# Patient Record
Sex: MALE | Race: WHITE | Employment: OTHER | URBAN - METROPOLITAN AREA
[De-identification: names, ages, dates, MRNs, and addresses within clinical notes are randomized per-mention and may not be internally consistent; named-entity substitution may affect disease eponyms.]

---

## 2017-01-18 ENCOUNTER — HOSPITAL ENCOUNTER (OUTPATIENT)
Dept: NEUROLOGY | Age: 72
Discharge: HOME | End: 2017-01-18
Payer: MEDICARE

## 2017-01-18 DIAGNOSIS — R20.2 PARESTHESIAS: ICD-10-CM

## 2017-01-18 PROCEDURE — 95886 MUSC TEST DONE W/N TEST COMP: CPT

## 2017-01-18 PROCEDURE — 95910 NRV CNDJ TEST 7-8 STUDIES: CPT

## 2017-01-19 ENCOUNTER — OFFICE VISIT (OUTPATIENT)
Dept: FAMILY MEDICINE CLINIC | Age: 72
End: 2017-01-19

## 2017-01-19 VITALS
SYSTOLIC BLOOD PRESSURE: 116 MMHG | WEIGHT: 223 LBS | HEIGHT: 70 IN | DIASTOLIC BLOOD PRESSURE: 76 MMHG | OXYGEN SATURATION: 96 % | HEART RATE: 75 BPM | BODY MASS INDEX: 31.92 KG/M2

## 2017-01-19 DIAGNOSIS — H90.3 SENSORINEURAL HEARING LOSS, BILATERAL: ICD-10-CM

## 2017-01-19 DIAGNOSIS — G56.03 CARPAL TUNNEL SYNDROME, BILATERAL: ICD-10-CM

## 2017-01-19 DIAGNOSIS — I10 ESSENTIAL HYPERTENSION: Primary | ICD-10-CM

## 2017-01-19 DIAGNOSIS — H60.61 CHRONIC OTITIS EXTERNA OF RIGHT EAR, UNSPECIFIED TYPE: ICD-10-CM

## 2017-01-19 PROCEDURE — G8484 FLU IMMUNIZE NO ADMIN: HCPCS | Performed by: FAMILY MEDICINE

## 2017-01-19 PROCEDURE — 1036F TOBACCO NON-USER: CPT | Performed by: FAMILY MEDICINE

## 2017-01-19 PROCEDURE — 99214 OFFICE O/P EST MOD 30 MIN: CPT | Performed by: FAMILY MEDICINE

## 2017-01-19 PROCEDURE — 3017F COLORECTAL CA SCREEN DOC REV: CPT | Performed by: FAMILY MEDICINE

## 2017-01-19 PROCEDURE — 1123F ACP DISCUSS/DSCN MKR DOCD: CPT | Performed by: FAMILY MEDICINE

## 2017-01-19 PROCEDURE — G8598 ASA/ANTIPLAT THER USED: HCPCS | Performed by: FAMILY MEDICINE

## 2017-01-19 PROCEDURE — 4040F PNEUMOC VAC/ADMIN/RCVD: CPT | Performed by: FAMILY MEDICINE

## 2017-01-19 PROCEDURE — G8419 CALC BMI OUT NRM PARAM NOF/U: HCPCS | Performed by: FAMILY MEDICINE

## 2017-01-19 PROCEDURE — G8427 DOCREV CUR MEDS BY ELIG CLIN: HCPCS | Performed by: FAMILY MEDICINE

## 2017-01-19 RX ORDER — HYDROCORTISONE AND ACETIC ACID 20.75; 10.375 MG/ML; MG/ML
4 SOLUTION AURICULAR (OTIC) 3 TIMES DAILY
Qty: 10 ML | Refills: 1 | Status: SHIPPED | OUTPATIENT
Start: 2017-01-19 | End: 2017-01-29

## 2017-01-19 ASSESSMENT — PATIENT HEALTH QUESTIONNAIRE - PHQ9
1. LITTLE INTEREST OR PLEASURE IN DOING THINGS: 0
2. FEELING DOWN, DEPRESSED OR HOPELESS: 0
SUM OF ALL RESPONSES TO PHQ QUESTIONS 1-9: 0
SUM OF ALL RESPONSES TO PHQ9 QUESTIONS 1 & 2: 0

## 2017-01-19 ASSESSMENT — ENCOUNTER SYMPTOMS
SHORTNESS OF BREATH: 0
BLURRED VISION: 0

## 2017-02-15 ENCOUNTER — HOSPITAL ENCOUNTER (OUTPATIENT)
Dept: NEUROLOGY | Age: 72
Discharge: HOME | End: 2017-02-15
Payer: MEDICARE

## 2017-02-15 PROCEDURE — 95910 NRV CNDJ TEST 7-8 STUDIES: CPT

## 2017-02-15 PROCEDURE — 95886 MUSC TEST DONE W/N TEST COMP: CPT

## 2017-03-09 ENCOUNTER — OFFICE VISIT (OUTPATIENT)
Dept: FAMILY MEDICINE CLINIC | Age: 72
End: 2017-03-09

## 2017-03-09 VITALS
TEMPERATURE: 97.6 F | HEART RATE: 80 BPM | HEIGHT: 70 IN | DIASTOLIC BLOOD PRESSURE: 70 MMHG | OXYGEN SATURATION: 97 % | WEIGHT: 222.5 LBS | SYSTOLIC BLOOD PRESSURE: 116 MMHG | BODY MASS INDEX: 31.85 KG/M2

## 2017-03-09 DIAGNOSIS — I20.8 STABLE ANGINA (HCC): ICD-10-CM

## 2017-03-09 DIAGNOSIS — J20.9 ACUTE BRONCHITIS, UNSPECIFIED ORGANISM: Primary | ICD-10-CM

## 2017-03-09 DIAGNOSIS — I25.10 CORONARY ARTERY DISEASE INVOLVING NATIVE CORONARY ARTERY OF NATIVE HEART WITHOUT ANGINA PECTORIS: ICD-10-CM

## 2017-03-09 PROCEDURE — G8598 ASA/ANTIPLAT THER USED: HCPCS | Performed by: FAMILY MEDICINE

## 2017-03-09 PROCEDURE — 4040F PNEUMOC VAC/ADMIN/RCVD: CPT | Performed by: FAMILY MEDICINE

## 2017-03-09 PROCEDURE — 99213 OFFICE O/P EST LOW 20 MIN: CPT | Performed by: FAMILY MEDICINE

## 2017-03-09 PROCEDURE — 1123F ACP DISCUSS/DSCN MKR DOCD: CPT | Performed by: FAMILY MEDICINE

## 2017-03-09 PROCEDURE — G8419 CALC BMI OUT NRM PARAM NOF/U: HCPCS | Performed by: FAMILY MEDICINE

## 2017-03-09 PROCEDURE — G8428 CUR MEDS NOT DOCUMENT: HCPCS | Performed by: FAMILY MEDICINE

## 2017-03-09 PROCEDURE — 1036F TOBACCO NON-USER: CPT | Performed by: FAMILY MEDICINE

## 2017-03-09 PROCEDURE — G8484 FLU IMMUNIZE NO ADMIN: HCPCS | Performed by: FAMILY MEDICINE

## 2017-03-09 PROCEDURE — 3017F COLORECTAL CA SCREEN DOC REV: CPT | Performed by: FAMILY MEDICINE

## 2017-03-09 RX ORDER — AZITHROMYCIN 250 MG/1
TABLET, FILM COATED ORAL
Qty: 1 PACKET | Refills: 0 | Status: SHIPPED | OUTPATIENT
Start: 2017-03-09 | End: 2017-03-19

## 2017-03-09 RX ORDER — METHYLPREDNISOLONE 4 MG/1
TABLET ORAL
Qty: 1 KIT | Refills: 0 | Status: SHIPPED | OUTPATIENT
Start: 2017-03-09 | End: 2017-03-15

## 2017-03-09 ASSESSMENT — ENCOUNTER SYMPTOMS
HEMOPTYSIS: 0
RHINORRHEA: 1
COUGH: 1
SORE THROAT: 0

## 2017-04-10 ENCOUNTER — OFFICE VISIT (OUTPATIENT)
Dept: CARDIOLOGY | Age: 72
End: 2017-04-10

## 2017-04-10 VITALS
DIASTOLIC BLOOD PRESSURE: 70 MMHG | HEART RATE: 77 BPM | BODY MASS INDEX: 32.07 KG/M2 | WEIGHT: 224 LBS | SYSTOLIC BLOOD PRESSURE: 120 MMHG | OXYGEN SATURATION: 95 % | HEIGHT: 70 IN

## 2017-04-10 DIAGNOSIS — I10 ESSENTIAL HYPERTENSION: ICD-10-CM

## 2017-04-10 DIAGNOSIS — I25.110 CORONARY ARTERY DISEASE INVOLVING NATIVE CORONARY ARTERY OF NATIVE HEART WITH UNSTABLE ANGINA PECTORIS (HCC): Primary | ICD-10-CM

## 2017-04-10 DIAGNOSIS — I67.9 CEREBROVASCULAR DISEASE: ICD-10-CM

## 2017-04-10 PROBLEM — M65.342 TRIGGER RING FINGER OF LEFT HAND: Status: ACTIVE | Noted: 2017-03-13

## 2017-04-10 PROBLEM — M65.341 TRIGGER RING FINGER OF RIGHT HAND: Status: ACTIVE | Noted: 2017-03-13

## 2017-04-10 PROCEDURE — G8417 CALC BMI ABV UP PARAM F/U: HCPCS | Performed by: INTERNAL MEDICINE

## 2017-04-10 PROCEDURE — G8598 ASA/ANTIPLAT THER USED: HCPCS | Performed by: INTERNAL MEDICINE

## 2017-04-10 PROCEDURE — G8427 DOCREV CUR MEDS BY ELIG CLIN: HCPCS | Performed by: INTERNAL MEDICINE

## 2017-04-10 PROCEDURE — 1036F TOBACCO NON-USER: CPT | Performed by: INTERNAL MEDICINE

## 2017-04-10 PROCEDURE — 99213 OFFICE O/P EST LOW 20 MIN: CPT | Performed by: INTERNAL MEDICINE

## 2017-04-10 PROCEDURE — 3017F COLORECTAL CA SCREEN DOC REV: CPT | Performed by: INTERNAL MEDICINE

## 2017-04-10 PROCEDURE — 1123F ACP DISCUSS/DSCN MKR DOCD: CPT | Performed by: INTERNAL MEDICINE

## 2017-04-10 PROCEDURE — 4040F PNEUMOC VAC/ADMIN/RCVD: CPT | Performed by: INTERNAL MEDICINE

## 2017-04-10 ASSESSMENT — ENCOUNTER SYMPTOMS
RESPIRATORY NEGATIVE: 1
COLOR CHANGE: 0
SHORTNESS OF BREATH: 0
COUGH: 0
CHEST TIGHTNESS: 0
APNEA: 0

## 2017-04-25 ENCOUNTER — OFFICE VISIT (OUTPATIENT)
Dept: FAMILY MEDICINE CLINIC | Age: 72
End: 2017-04-25

## 2017-04-25 VITALS
HEART RATE: 62 BPM | BODY MASS INDEX: 30.92 KG/M2 | OXYGEN SATURATION: 97 % | TEMPERATURE: 97 F | DIASTOLIC BLOOD PRESSURE: 62 MMHG | WEIGHT: 216 LBS | SYSTOLIC BLOOD PRESSURE: 118 MMHG | HEIGHT: 70 IN

## 2017-04-25 DIAGNOSIS — L85.3 DRY SKIN: ICD-10-CM

## 2017-04-25 DIAGNOSIS — L73.9 FOLLICULITIS: Primary | ICD-10-CM

## 2017-04-25 PROCEDURE — 1036F TOBACCO NON-USER: CPT | Performed by: NURSE PRACTITIONER

## 2017-04-25 PROCEDURE — 3017F COLORECTAL CA SCREEN DOC REV: CPT | Performed by: NURSE PRACTITIONER

## 2017-04-25 PROCEDURE — G8417 CALC BMI ABV UP PARAM F/U: HCPCS | Performed by: NURSE PRACTITIONER

## 2017-04-25 PROCEDURE — 4040F PNEUMOC VAC/ADMIN/RCVD: CPT | Performed by: NURSE PRACTITIONER

## 2017-04-25 PROCEDURE — G8427 DOCREV CUR MEDS BY ELIG CLIN: HCPCS | Performed by: NURSE PRACTITIONER

## 2017-04-25 PROCEDURE — G8598 ASA/ANTIPLAT THER USED: HCPCS | Performed by: NURSE PRACTITIONER

## 2017-04-25 PROCEDURE — 99212 OFFICE O/P EST SF 10 MIN: CPT | Performed by: NURSE PRACTITIONER

## 2017-04-25 PROCEDURE — 1123F ACP DISCUSS/DSCN MKR DOCD: CPT | Performed by: NURSE PRACTITIONER

## 2017-04-25 ASSESSMENT — ENCOUNTER SYMPTOMS
COUGH: 0
SHORTNESS OF BREATH: 0

## 2017-05-18 ENCOUNTER — OFFICE VISIT (OUTPATIENT)
Dept: FAMILY MEDICINE CLINIC | Age: 72
End: 2017-05-18

## 2017-05-18 VITALS
DIASTOLIC BLOOD PRESSURE: 68 MMHG | HEART RATE: 77 BPM | HEIGHT: 70 IN | BODY MASS INDEX: 31.35 KG/M2 | SYSTOLIC BLOOD PRESSURE: 104 MMHG | WEIGHT: 219 LBS | OXYGEN SATURATION: 96 %

## 2017-05-18 DIAGNOSIS — H60.61 CHRONIC OTITIS EXTERNA OF RIGHT EAR, UNSPECIFIED TYPE: ICD-10-CM

## 2017-05-18 DIAGNOSIS — E78.2 MIXED HYPERLIPIDEMIA: ICD-10-CM

## 2017-05-18 DIAGNOSIS — I10 ESSENTIAL HYPERTENSION: Primary | ICD-10-CM

## 2017-05-18 PROCEDURE — 99214 OFFICE O/P EST MOD 30 MIN: CPT | Performed by: FAMILY MEDICINE

## 2017-05-18 PROCEDURE — 4040F PNEUMOC VAC/ADMIN/RCVD: CPT | Performed by: FAMILY MEDICINE

## 2017-05-18 PROCEDURE — 3017F COLORECTAL CA SCREEN DOC REV: CPT | Performed by: FAMILY MEDICINE

## 2017-05-18 PROCEDURE — G8417 CALC BMI ABV UP PARAM F/U: HCPCS | Performed by: FAMILY MEDICINE

## 2017-05-18 PROCEDURE — G8598 ASA/ANTIPLAT THER USED: HCPCS | Performed by: FAMILY MEDICINE

## 2017-05-18 PROCEDURE — 1036F TOBACCO NON-USER: CPT | Performed by: FAMILY MEDICINE

## 2017-05-18 PROCEDURE — G8427 DOCREV CUR MEDS BY ELIG CLIN: HCPCS | Performed by: FAMILY MEDICINE

## 2017-05-18 PROCEDURE — 1123F ACP DISCUSS/DSCN MKR DOCD: CPT | Performed by: FAMILY MEDICINE

## 2017-05-18 RX ORDER — HYDROCORTISONE AND ACETIC ACID 20.75; 10.375 MG/ML; MG/ML
SOLUTION AURICULAR (OTIC)
Refills: 0 | COMMUNITY
Start: 2017-05-02 | End: 2017-09-19 | Stop reason: ALTCHOICE

## 2017-05-18 ASSESSMENT — ENCOUNTER SYMPTOMS
SHORTNESS OF BREATH: 0
BLURRED VISION: 0

## 2017-09-01 RX ORDER — FINASTERIDE 5 MG/1
TABLET, FILM COATED ORAL
Qty: 90 TABLET | Refills: 3 | Status: SHIPPED | OUTPATIENT
Start: 2017-09-01 | End: 2018-12-11 | Stop reason: SDUPTHER

## 2017-09-19 ENCOUNTER — OFFICE VISIT (OUTPATIENT)
Dept: FAMILY MEDICINE CLINIC | Age: 72
End: 2017-09-19

## 2017-09-19 VITALS
HEART RATE: 74 BPM | HEIGHT: 70 IN | OXYGEN SATURATION: 96 % | SYSTOLIC BLOOD PRESSURE: 102 MMHG | WEIGHT: 219 LBS | BODY MASS INDEX: 31.35 KG/M2 | DIASTOLIC BLOOD PRESSURE: 74 MMHG

## 2017-09-19 DIAGNOSIS — N52.9 ERECTILE DYSFUNCTION, UNSPECIFIED ERECTILE DYSFUNCTION TYPE: ICD-10-CM

## 2017-09-19 DIAGNOSIS — E78.2 MIXED HYPERLIPIDEMIA: ICD-10-CM

## 2017-09-19 DIAGNOSIS — Z23 NEEDS FLU SHOT: ICD-10-CM

## 2017-09-19 DIAGNOSIS — I10 ESSENTIAL HYPERTENSION: Primary | ICD-10-CM

## 2017-09-19 DIAGNOSIS — E29.1 HYPOGONADISM IN MALE: ICD-10-CM

## 2017-09-19 DIAGNOSIS — I10 ESSENTIAL HYPERTENSION: ICD-10-CM

## 2017-09-19 PROBLEM — M65.341 TRIGGER RING FINGER OF RIGHT HAND: Status: RESOLVED | Noted: 2017-03-13 | Resolved: 2017-09-19

## 2017-09-19 PROBLEM — M65.342 TRIGGER RING FINGER OF LEFT HAND: Status: RESOLVED | Noted: 2017-03-13 | Resolved: 2017-09-19

## 2017-09-19 LAB
ALT SERPL-CCNC: 22 U/L (ref 0–41)
ANION GAP SERPL CALCULATED.3IONS-SCNC: 16 MEQ/L (ref 7–13)
AST SERPL-CCNC: 23 U/L (ref 0–40)
BASOPHILS ABSOLUTE: 0 K/UL (ref 0–0.2)
BASOPHILS RELATIVE PERCENT: 0.3 %
BUN BLDV-MCNC: 15 MG/DL (ref 8–23)
CALCIUM SERPL-MCNC: 9 MG/DL (ref 8.6–10.2)
CHLORIDE BLD-SCNC: 102 MEQ/L (ref 98–107)
CHOLESTEROL, TOTAL: 129 MG/DL (ref 0–199)
CO2: 23 MEQ/L (ref 22–29)
CREAT SERPL-MCNC: 0.65 MG/DL (ref 0.7–1.2)
EOSINOPHILS ABSOLUTE: 0.1 K/UL (ref 0–0.7)
EOSINOPHILS RELATIVE PERCENT: 1.1 %
GFR AFRICAN AMERICAN: >60
GFR NON-AFRICAN AMERICAN: >60
GLUCOSE BLD-MCNC: 99 MG/DL (ref 74–109)
HCT VFR BLD CALC: 46 % (ref 42–52)
HDLC SERPL-MCNC: 45 MG/DL (ref 40–59)
HEMOGLOBIN: 15.5 G/DL (ref 14–18)
LDL CHOLESTEROL CALCULATED: 61 MG/DL (ref 0–129)
LYMPHOCYTES ABSOLUTE: 1.2 K/UL (ref 1–4.8)
LYMPHOCYTES RELATIVE PERCENT: 15.5 %
MCH RBC QN AUTO: 30.6 PG (ref 27–31.3)
MCHC RBC AUTO-ENTMCNC: 33.8 % (ref 33–37)
MCV RBC AUTO: 90.5 FL (ref 80–100)
MONOCYTES ABSOLUTE: 0.5 K/UL (ref 0.2–0.8)
MONOCYTES RELATIVE PERCENT: 6.9 %
NEUTROPHILS ABSOLUTE: 6 K/UL (ref 1.4–6.5)
NEUTROPHILS RELATIVE PERCENT: 76.2 %
PDW BLD-RTO: 12.8 % (ref 11.5–14.5)
PLATELET # BLD: 203 K/UL (ref 130–400)
POTASSIUM SERPL-SCNC: 4.4 MEQ/L (ref 3.5–5.1)
RBC # BLD: 5.08 M/UL (ref 4.7–6.1)
SODIUM BLD-SCNC: 141 MEQ/L (ref 132–144)
TRIGL SERPL-MCNC: 114 MG/DL (ref 0–200)
WBC # BLD: 7.8 K/UL (ref 4.8–10.8)

## 2017-09-19 PROCEDURE — 90662 IIV NO PRSV INCREASED AG IM: CPT | Performed by: FAMILY MEDICINE

## 2017-09-19 PROCEDURE — G8427 DOCREV CUR MEDS BY ELIG CLIN: HCPCS | Performed by: FAMILY MEDICINE

## 2017-09-19 PROCEDURE — 1036F TOBACCO NON-USER: CPT | Performed by: FAMILY MEDICINE

## 2017-09-19 PROCEDURE — G8598 ASA/ANTIPLAT THER USED: HCPCS | Performed by: FAMILY MEDICINE

## 2017-09-19 PROCEDURE — 4040F PNEUMOC VAC/ADMIN/RCVD: CPT | Performed by: FAMILY MEDICINE

## 2017-09-19 PROCEDURE — G0008 ADMIN INFLUENZA VIRUS VAC: HCPCS | Performed by: FAMILY MEDICINE

## 2017-09-19 PROCEDURE — 1123F ACP DISCUSS/DSCN MKR DOCD: CPT | Performed by: FAMILY MEDICINE

## 2017-09-19 PROCEDURE — 99214 OFFICE O/P EST MOD 30 MIN: CPT | Performed by: FAMILY MEDICINE

## 2017-09-19 PROCEDURE — G8417 CALC BMI ABV UP PARAM F/U: HCPCS | Performed by: FAMILY MEDICINE

## 2017-09-19 PROCEDURE — 3017F COLORECTAL CA SCREEN DOC REV: CPT | Performed by: FAMILY MEDICINE

## 2017-09-19 RX ORDER — SILDENAFIL 50 MG/1
50 TABLET, FILM COATED ORAL PRN
Qty: 12 TABLET | Refills: 2 | Status: SHIPPED | OUTPATIENT
Start: 2017-09-19 | End: 2019-05-13

## 2017-09-19 ASSESSMENT — ENCOUNTER SYMPTOMS
BLURRED VISION: 0
SHORTNESS OF BREATH: 0

## 2017-09-20 LAB — TESTOSTERONE TOTAL-MALE: 485 NG/DL (ref 300–720)

## 2017-11-15 DIAGNOSIS — N13.8 BPH WITH OBSTRUCTION/LOWER URINARY TRACT SYMPTOMS: ICD-10-CM

## 2017-11-15 DIAGNOSIS — N40.1 BPH WITH OBSTRUCTION/LOWER URINARY TRACT SYMPTOMS: ICD-10-CM

## 2017-11-15 LAB — PROSTATE SPECIFIC ANTIGEN: 1.75 NG/ML (ref 0–6.22)

## 2017-11-17 ENCOUNTER — OFFICE VISIT (OUTPATIENT)
Dept: UROLOGY | Age: 72
End: 2017-11-17

## 2017-11-17 VITALS
SYSTOLIC BLOOD PRESSURE: 138 MMHG | DIASTOLIC BLOOD PRESSURE: 84 MMHG | HEIGHT: 70 IN | WEIGHT: 218 LBS | HEART RATE: 74 BPM | BODY MASS INDEX: 31.21 KG/M2

## 2017-11-17 DIAGNOSIS — N13.8 BPH WITH OBSTRUCTION/LOWER URINARY TRACT SYMPTOMS: Primary | ICD-10-CM

## 2017-11-17 DIAGNOSIS — N40.1 BPH WITH OBSTRUCTION/LOWER URINARY TRACT SYMPTOMS: Primary | ICD-10-CM

## 2017-11-17 PROCEDURE — G8598 ASA/ANTIPLAT THER USED: HCPCS | Performed by: UROLOGY

## 2017-11-17 PROCEDURE — G8417 CALC BMI ABV UP PARAM F/U: HCPCS | Performed by: UROLOGY

## 2017-11-17 PROCEDURE — G8484 FLU IMMUNIZE NO ADMIN: HCPCS | Performed by: UROLOGY

## 2017-11-17 PROCEDURE — G8427 DOCREV CUR MEDS BY ELIG CLIN: HCPCS | Performed by: UROLOGY

## 2017-11-17 PROCEDURE — 1036F TOBACCO NON-USER: CPT | Performed by: UROLOGY

## 2017-11-17 PROCEDURE — 1123F ACP DISCUSS/DSCN MKR DOCD: CPT | Performed by: UROLOGY

## 2017-11-17 PROCEDURE — 4040F PNEUMOC VAC/ADMIN/RCVD: CPT | Performed by: UROLOGY

## 2017-11-17 PROCEDURE — 3017F COLORECTAL CA SCREEN DOC REV: CPT | Performed by: UROLOGY

## 2017-11-17 PROCEDURE — 99213 OFFICE O/P EST LOW 20 MIN: CPT | Performed by: UROLOGY

## 2017-11-17 RX ORDER — TAMSULOSIN HYDROCHLORIDE 0.4 MG/1
0.4 CAPSULE ORAL DAILY
Qty: 90 CAPSULE | Refills: 3 | Status: SHIPPED | OUTPATIENT
Start: 2017-11-17 | End: 2018-12-11 | Stop reason: SDUPTHER

## 2017-11-17 ASSESSMENT — ENCOUNTER SYMPTOMS: SHORTNESS OF BREATH: 0

## 2017-12-11 RX ORDER — SIMVASTATIN 20 MG
TABLET ORAL
Qty: 90 TABLET | Refills: 3 | Status: SHIPPED | OUTPATIENT
Start: 2017-12-11 | End: 2018-12-09 | Stop reason: SDUPTHER

## 2018-03-20 ENCOUNTER — OFFICE VISIT (OUTPATIENT)
Dept: FAMILY MEDICINE CLINIC | Age: 73
End: 2018-03-20
Payer: MEDICARE

## 2018-03-20 VITALS
DIASTOLIC BLOOD PRESSURE: 82 MMHG | HEIGHT: 70 IN | SYSTOLIC BLOOD PRESSURE: 118 MMHG | OXYGEN SATURATION: 95 % | BODY MASS INDEX: 32.84 KG/M2 | HEART RATE: 76 BPM | WEIGHT: 229.4 LBS

## 2018-03-20 DIAGNOSIS — I10 ESSENTIAL HYPERTENSION: ICD-10-CM

## 2018-03-20 DIAGNOSIS — H60.61 CHRONIC OTITIS EXTERNA OF RIGHT EAR, UNSPECIFIED TYPE: ICD-10-CM

## 2018-03-20 DIAGNOSIS — N52.9 ERECTILE DYSFUNCTION, UNSPECIFIED ERECTILE DYSFUNCTION TYPE: ICD-10-CM

## 2018-03-20 DIAGNOSIS — E78.2 MIXED HYPERLIPIDEMIA: Primary | ICD-10-CM

## 2018-03-20 PROCEDURE — G8482 FLU IMMUNIZE ORDER/ADMIN: HCPCS | Performed by: FAMILY MEDICINE

## 2018-03-20 PROCEDURE — G8598 ASA/ANTIPLAT THER USED: HCPCS | Performed by: FAMILY MEDICINE

## 2018-03-20 PROCEDURE — 99214 OFFICE O/P EST MOD 30 MIN: CPT | Performed by: FAMILY MEDICINE

## 2018-03-20 PROCEDURE — G8417 CALC BMI ABV UP PARAM F/U: HCPCS | Performed by: FAMILY MEDICINE

## 2018-03-20 PROCEDURE — 4040F PNEUMOC VAC/ADMIN/RCVD: CPT | Performed by: FAMILY MEDICINE

## 2018-03-20 PROCEDURE — 1036F TOBACCO NON-USER: CPT | Performed by: FAMILY MEDICINE

## 2018-03-20 PROCEDURE — 1123F ACP DISCUSS/DSCN MKR DOCD: CPT | Performed by: FAMILY MEDICINE

## 2018-03-20 PROCEDURE — 3288F FALL RISK ASSESSMENT DOCD: CPT | Performed by: FAMILY MEDICINE

## 2018-03-20 PROCEDURE — G8510 SCR DEP NEG, NO PLAN REQD: HCPCS | Performed by: FAMILY MEDICINE

## 2018-03-20 PROCEDURE — G8427 DOCREV CUR MEDS BY ELIG CLIN: HCPCS | Performed by: FAMILY MEDICINE

## 2018-03-20 PROCEDURE — 3017F COLORECTAL CA SCREEN DOC REV: CPT | Performed by: FAMILY MEDICINE

## 2018-03-20 ASSESSMENT — PATIENT HEALTH QUESTIONNAIRE - PHQ9
SUM OF ALL RESPONSES TO PHQ9 QUESTIONS 1 & 2: 0
SUM OF ALL RESPONSES TO PHQ QUESTIONS 1-9: 0
1. LITTLE INTEREST OR PLEASURE IN DOING THINGS: 0
2. FEELING DOWN, DEPRESSED OR HOPELESS: 0

## 2018-03-20 ASSESSMENT — ENCOUNTER SYMPTOMS
SHORTNESS OF BREATH: 0
ABDOMINAL PAIN: 0

## 2018-03-20 NOTE — PROGRESS NOTES
Subjective  Dao Gregory, 67 y.o. male presents today with:  Chief Complaint   Patient presents with    6 Month Follow-Up       Hyperlipidemia   This is a chronic problem. The current episode started more than 1 year ago. The problem is controlled. Recent lipid tests were reviewed and are normal. He has no history of chronic renal disease, diabetes or liver disease. There are no known factors aggravating his hyperlipidemia. Pertinent negatives include no chest pain, leg pain or shortness of breath. Current antihyperlipidemic treatment includes statins. The current treatment provides significant improvement of lipids. There are no compliance problems. Risk factors for coronary artery disease include dyslipidemia, hypertension, male sex and obesity. Here for f/u on chronic problems including HTN, hyperlipidemia, ED and chronic otitis externa for which he sees Dr. Saloni Patel. No recent problems. Review of Systems   Constitutional: Negative for fever. Respiratory: Negative for shortness of breath. Cardiovascular: Negative for chest pain. Gastrointestinal: Negative for abdominal pain. Skin: Negative for rash.        Past Medical History:   Diagnosis Date    BPH (benign prostatic hypertrophy)     CAD (coronary artery disease)     Cerebrovascular disease     Chronic otitis externa 2/11/2016    Chronic suppurative otitis media 11/11/2015    Ear injury 2006    [facility]    ED (erectile dysfunction) 11/28/2014    Hearing loss     History of nephrolithiasis     History of pneumonia 2004    History of prediabetes     Hyperlipidemia 10/10/2013    Hypertension     Hypogonadism in male     Mass of chest wall, left     Optic neuropathy, ischemic     Paresthesias     Perforation of right tympanic membrane     chronic; Dr. Mar Osman PSA (prostate specific antigen) 2007- 1.12    Seborrheic keratosis 10/10/2013    Sensorineural hearing loss, bilateral     Stable angina ([address]  Trigger thumb, left thumb      Past Surgical History:   Procedure Laterality Date    CARPAL TUNNEL RELEASE Right 05/30/2017    CCRAVEN PEARL MD    CARPAL TUNNEL RELEASE Left 06/2017    CCRAVEN PEARL MD    COLONOSCOPY  6/2015    Dr. Goncalves [place] RESECTION  3/22/16    DR. BARRAGAN    OTHER SURGICAL HISTORY Left 1/2015    hydrocelectomy     Social History     Social History    Marital status:      Spouse name: N/A    Number of children: 2    Years of education: N/A     Occupational History    Not on file. Social History Main Topics    Smoking status: Never Smoker    Smokeless tobacco: Never Used    Alcohol use Yes      Comment: occasionally    Drug use: No    Sexual activity: Not Currently     Other Topics Concern    Not on file     Social History Narrative    2 [place] in [place]. Retired from [employer]. Family History   Problem Relation Age of Onset    Stroke Father      No Known Allergies  Current Outpatient Prescriptions   Medication Sig Dispense Refill    irbesartan (AVAPRO) 150 MG tablet TAKE 1 TABLET DAILY 90 tablet 1    simvastatin (ZOCOR) 20 MG tablet TAKE 1 TABLET NIGHTLY 90 tablet 3    tamsulosin (FLOMAX) 0.4 MG capsule Take 1 capsule by mouth daily 90 capsule 3    sildenafil (VIAGRA) 50 MG tablet Take 1 tablet by mouth as needed for Erectile Dysfunction 12 tablet 2    finasteride (PROSCAR) 5 MG tablet TAKE 1 TABLET DAILY 90 tablet 3    Ascorbic Acid (VITAMIN C) 500 MG tablet Take 500 mg by mouth daily      Multiple Vitamins-Minerals (MULTIVITAMIN PO) Take by mouth      aspirin 81 MG tablet Take 81 mg by mouth daily. No current facility-administered medications for this visit. Objective    Vitals:    03/20/18 0916   BP: 118/82   Pulse: 76   SpO2: 95%   Weight: 229 lb 6.4 oz (104.1 kg)   Height: 5' 10\" (1.778 m)       Physical Exam   Constitutional: He appears well-developed and well-nourished. HENT:   Head: Normocephalic and atraumatic. Right Ear: External ear and ear canal normal. Tympanic membrane is perforated. Left Ear: External ear and ear canal normal. Tympanic membrane is perforated. Nose: Nose normal.   Mouth/Throat: Uvula is midline, oropharynx is clear and moist and mucous membranes are normal.   Neck: Normal range of motion. Neck supple. Cardiovascular: Normal rate, regular rhythm and normal heart sounds. No murmur heard. Pulmonary/Chest: Effort normal and breath sounds normal. He has no wheezes. Lymphadenopathy:     He has no cervical adenopathy. Skin: Skin is warm and dry. No rash noted. Lab Results   Component Value Date    CHOL 129 09/19/2017    CHOL 148 11/10/2016    CHOL 186 12/03/2015     Lab Results   Component Value Date    TRIG 114 09/19/2017    TRIG 94 11/10/2016    TRIG 113 12/03/2015     Lab Results   Component Value Date    HDL 45 09/19/2017    HDL 44 11/10/2016    HDL 52 12/03/2015     Lab Results   Component Value Date    LDLCALC 61 09/19/2017    LDLCALC 85 11/10/2016    LDLCALC 111 12/03/2015     No results found for: LABVLDL, VLDL  Lab Results   Component Value Date    CHOLHDLRATIO 4.7 03/12/2012       Assessment & Plan   1. Mixed hyperlipidemia  ALT    AST    Lipid Panel   2. Essential hypertension  ALT    AST    Basic Metabolic Panel    CBC Auto Differential    Lipid Panel   3. Erectile dysfunction, unspecified erectile dysfunction type     4. Chronic otitis externa of right ear, unspecified type       BP and lipids controlled so continue same. ED stable on viagra. f/u in 6 months with labs prior. Chronic otitis controlled with no recent flairs.       Orders Placed This Encounter   Procedures    ALT     Standing Status:   Future     Standing Expiration Date:   3/20/2019    AST     Standing Status:   Future     Standing Expiration Date:   3/20/2019    Basic Metabolic Panel     Standing Status:   Future     Standing Expiration Date:   3/20/2019    CBC Auto Differential     Standing Status:   Future     Standing Expiration Date:   3/20/2019    Lipid Panel     Standing Status:   Future     Standing Expiration Date:   3/20/2019     Order Specific Question:   Is Patient Fasting?/# of Hours     Answer:   yes, 12 hours     No orders of the defined types were placed in this encounter. There are no discontinued medications. Return in about 6 months (around 9/20/2018).     Shannan Arndt MD

## 2018-04-09 ENCOUNTER — OFFICE VISIT (OUTPATIENT)
Dept: CARDIOLOGY CLINIC | Age: 73
End: 2018-04-09
Payer: MEDICARE

## 2018-04-09 VITALS
BODY MASS INDEX: 32.56 KG/M2 | HEART RATE: 77 BPM | OXYGEN SATURATION: 96 % | SYSTOLIC BLOOD PRESSURE: 110 MMHG | DIASTOLIC BLOOD PRESSURE: 60 MMHG | WEIGHT: 227.4 LBS | HEIGHT: 70 IN

## 2018-04-09 DIAGNOSIS — I10 ESSENTIAL HYPERTENSION: ICD-10-CM

## 2018-04-09 DIAGNOSIS — I25.10 CORONARY ARTERY DISEASE INVOLVING NATIVE CORONARY ARTERY OF NATIVE HEART WITHOUT ANGINA PECTORIS: Primary | ICD-10-CM

## 2018-04-09 PROCEDURE — 1036F TOBACCO NON-USER: CPT | Performed by: INTERNAL MEDICINE

## 2018-04-09 PROCEDURE — G8417 CALC BMI ABV UP PARAM F/U: HCPCS | Performed by: INTERNAL MEDICINE

## 2018-04-09 PROCEDURE — 4040F PNEUMOC VAC/ADMIN/RCVD: CPT | Performed by: INTERNAL MEDICINE

## 2018-04-09 PROCEDURE — G8427 DOCREV CUR MEDS BY ELIG CLIN: HCPCS | Performed by: INTERNAL MEDICINE

## 2018-04-09 PROCEDURE — 3017F COLORECTAL CA SCREEN DOC REV: CPT | Performed by: INTERNAL MEDICINE

## 2018-04-09 PROCEDURE — 1123F ACP DISCUSS/DSCN MKR DOCD: CPT | Performed by: INTERNAL MEDICINE

## 2018-04-09 PROCEDURE — 99213 OFFICE O/P EST LOW 20 MIN: CPT | Performed by: INTERNAL MEDICINE

## 2018-04-09 PROCEDURE — G8598 ASA/ANTIPLAT THER USED: HCPCS | Performed by: INTERNAL MEDICINE

## 2018-04-09 ASSESSMENT — ENCOUNTER SYMPTOMS
APNEA: 0
CHEST TIGHTNESS: 0
SHORTNESS OF BREATH: 0

## 2018-06-07 ENCOUNTER — OFFICE VISIT (OUTPATIENT)
Dept: FAMILY MEDICINE CLINIC | Age: 73
End: 2018-06-07
Payer: MEDICARE

## 2018-06-07 VITALS
TEMPERATURE: 98.1 F | WEIGHT: 227.4 LBS | HEIGHT: 70 IN | DIASTOLIC BLOOD PRESSURE: 78 MMHG | BODY MASS INDEX: 32.56 KG/M2 | SYSTOLIC BLOOD PRESSURE: 132 MMHG | OXYGEN SATURATION: 97 % | HEART RATE: 89 BPM

## 2018-06-07 DIAGNOSIS — H66.011 ACUTE SUPPURATIVE OTITIS MEDIA OF RIGHT EAR WITH SPONTANEOUS RUPTURE OF TYMPANIC MEMBRANE, RECURRENCE NOT SPECIFIED: Primary | ICD-10-CM

## 2018-06-07 PROCEDURE — G8417 CALC BMI ABV UP PARAM F/U: HCPCS | Performed by: NURSE PRACTITIONER

## 2018-06-07 PROCEDURE — 3017F COLORECTAL CA SCREEN DOC REV: CPT | Performed by: NURSE PRACTITIONER

## 2018-06-07 PROCEDURE — 99213 OFFICE O/P EST LOW 20 MIN: CPT | Performed by: NURSE PRACTITIONER

## 2018-06-07 PROCEDURE — 1123F ACP DISCUSS/DSCN MKR DOCD: CPT | Performed by: NURSE PRACTITIONER

## 2018-06-07 PROCEDURE — G8427 DOCREV CUR MEDS BY ELIG CLIN: HCPCS | Performed by: NURSE PRACTITIONER

## 2018-06-07 PROCEDURE — 1036F TOBACCO NON-USER: CPT | Performed by: NURSE PRACTITIONER

## 2018-06-07 PROCEDURE — 4040F PNEUMOC VAC/ADMIN/RCVD: CPT | Performed by: NURSE PRACTITIONER

## 2018-06-07 PROCEDURE — G8598 ASA/ANTIPLAT THER USED: HCPCS | Performed by: NURSE PRACTITIONER

## 2018-06-07 RX ORDER — AMOXICILLIN AND CLAVULANATE POTASSIUM 875; 125 MG/1; MG/1
1 TABLET, FILM COATED ORAL 2 TIMES DAILY
Qty: 20 TABLET | Refills: 0 | Status: SHIPPED | OUTPATIENT
Start: 2018-06-07 | End: 2018-06-11 | Stop reason: SDUPTHER

## 2018-06-07 ASSESSMENT — ENCOUNTER SYMPTOMS
ABDOMINAL PAIN: 0
VOMITING: 0
DIARRHEA: 0
SORE THROAT: 0
COUGH: 0

## 2018-06-11 ENCOUNTER — TELEPHONE (OUTPATIENT)
Dept: FAMILY MEDICINE CLINIC | Age: 73
End: 2018-06-11

## 2018-06-11 ENCOUNTER — TELEPHONE (OUTPATIENT)
Dept: SURGERY | Age: 73
End: 2018-06-11

## 2018-06-11 DIAGNOSIS — H66.011 ACUTE SUPPURATIVE OTITIS MEDIA OF RIGHT EAR WITH SPONTANEOUS RUPTURE OF TYMPANIC MEMBRANE, RECURRENCE NOT SPECIFIED: ICD-10-CM

## 2018-06-11 RX ORDER — AMOXICILLIN AND CLAVULANATE POTASSIUM 875; 125 MG/1; MG/1
1 TABLET, FILM COATED ORAL 2 TIMES DAILY
Qty: 20 TABLET | Refills: 0 | Status: SHIPPED | OUTPATIENT
Start: 2018-06-11 | End: 2018-06-21

## 2018-06-28 ENCOUNTER — OFFICE VISIT (OUTPATIENT)
Dept: FAMILY MEDICINE CLINIC | Age: 73
End: 2018-06-28
Payer: MEDICARE

## 2018-06-28 VITALS
HEIGHT: 70 IN | DIASTOLIC BLOOD PRESSURE: 76 MMHG | WEIGHT: 224.4 LBS | SYSTOLIC BLOOD PRESSURE: 122 MMHG | TEMPERATURE: 98.1 F | BODY MASS INDEX: 32.13 KG/M2 | HEART RATE: 77 BPM | OXYGEN SATURATION: 98 %

## 2018-06-28 DIAGNOSIS — M19.90 OSTEOARTHRITIS, UNSPECIFIED OSTEOARTHRITIS TYPE, UNSPECIFIED SITE: ICD-10-CM

## 2018-06-28 DIAGNOSIS — G89.29 CHRONIC LEFT SHOULDER PAIN: Primary | ICD-10-CM

## 2018-06-28 DIAGNOSIS — M25.551 RIGHT HIP PAIN: ICD-10-CM

## 2018-06-28 DIAGNOSIS — M25.512 CHRONIC LEFT SHOULDER PAIN: Primary | ICD-10-CM

## 2018-06-28 DIAGNOSIS — Z12.11 COLON CANCER SCREENING: ICD-10-CM

## 2018-06-28 PROCEDURE — G8417 CALC BMI ABV UP PARAM F/U: HCPCS | Performed by: NURSE PRACTITIONER

## 2018-06-28 PROCEDURE — G8598 ASA/ANTIPLAT THER USED: HCPCS | Performed by: NURSE PRACTITIONER

## 2018-06-28 PROCEDURE — G8427 DOCREV CUR MEDS BY ELIG CLIN: HCPCS | Performed by: NURSE PRACTITIONER

## 2018-06-28 PROCEDURE — 1036F TOBACCO NON-USER: CPT | Performed by: NURSE PRACTITIONER

## 2018-06-28 PROCEDURE — 3017F COLORECTAL CA SCREEN DOC REV: CPT | Performed by: NURSE PRACTITIONER

## 2018-06-28 PROCEDURE — 99213 OFFICE O/P EST LOW 20 MIN: CPT | Performed by: NURSE PRACTITIONER

## 2018-06-28 PROCEDURE — 1123F ACP DISCUSS/DSCN MKR DOCD: CPT | Performed by: NURSE PRACTITIONER

## 2018-06-28 PROCEDURE — 4040F PNEUMOC VAC/ADMIN/RCVD: CPT | Performed by: NURSE PRACTITIONER

## 2018-06-28 RX ORDER — METHYLPREDNISOLONE 4 MG/1
TABLET ORAL
Qty: 21 TABLET | Refills: 0 | Status: SHIPPED | OUTPATIENT
Start: 2018-06-28 | End: 2018-07-04

## 2018-06-28 RX ORDER — CIPROFLOXACIN AND DEXAMETHASONE 3; 1 MG/ML; MG/ML
4 SUSPENSION/ DROPS AURICULAR (OTIC) 2 TIMES DAILY
COMMUNITY
End: 2018-12-11 | Stop reason: ALTCHOICE

## 2018-06-29 ENCOUNTER — TELEPHONE (OUTPATIENT)
Dept: FAMILY MEDICINE CLINIC | Age: 73
End: 2018-06-29

## 2018-06-29 NOTE — TELEPHONE ENCOUNTER
Pt calling to get an update about an injection that you and he spoke about during his appt. It's for his neck/shoulder area. Can you please call pt back?

## 2018-07-02 NOTE — TELEPHONE ENCOUNTER
I was talking about a general kenalog shot. Are the oral steroids improving? The orthopedic will likely discuss targeted steroid injections.

## 2018-08-12 ENCOUNTER — HOSPITAL ENCOUNTER (OUTPATIENT)
Dept: MRI IMAGING | Age: 73
Discharge: HOME | End: 2018-08-14
Payer: MEDICARE

## 2018-08-12 DIAGNOSIS — M75.42 IMPINGEMENT SYNDROME OF LEFT SHOULDER: ICD-10-CM

## 2018-08-12 PROCEDURE — 73221 MRI JOINT UPR EXTREM W/O DYE: CPT

## 2018-08-27 DIAGNOSIS — Z12.11 COLON CANCER SCREENING: ICD-10-CM

## 2018-08-27 LAB
CONTROL: NORMAL
HEMOCCULT STL QL: NEGATIVE

## 2018-08-27 PROCEDURE — 82274 ASSAY TEST FOR BLOOD FECAL: CPT | Performed by: NURSE PRACTITIONER

## 2018-09-11 ENCOUNTER — NURSE ONLY (OUTPATIENT)
Dept: FAMILY MEDICINE CLINIC | Age: 73
End: 2018-09-11
Payer: MEDICARE

## 2018-09-11 DIAGNOSIS — Z23 INFLUENZA VACCINE NEEDED: Primary | ICD-10-CM

## 2018-09-11 DIAGNOSIS — I10 ESSENTIAL HYPERTENSION: ICD-10-CM

## 2018-09-11 DIAGNOSIS — E78.2 MIXED HYPERLIPIDEMIA: ICD-10-CM

## 2018-09-11 LAB
ALT SERPL-CCNC: 26 U/L (ref 0–41)
ANION GAP SERPL CALCULATED.3IONS-SCNC: 14 MEQ/L (ref 7–13)
AST SERPL-CCNC: 22 U/L (ref 0–40)
BASOPHILS ABSOLUTE: 0 K/UL (ref 0–0.2)
BASOPHILS RELATIVE PERCENT: 0.6 %
BUN BLDV-MCNC: 11 MG/DL (ref 8–23)
CALCIUM SERPL-MCNC: 9 MG/DL (ref 8.6–10.2)
CHLORIDE BLD-SCNC: 103 MEQ/L (ref 98–107)
CHOLESTEROL, TOTAL: 143 MG/DL (ref 0–199)
CO2: 24 MEQ/L (ref 22–29)
CREAT SERPL-MCNC: 0.8 MG/DL (ref 0.7–1.2)
EOSINOPHILS ABSOLUTE: 0.1 K/UL (ref 0–0.7)
EOSINOPHILS RELATIVE PERCENT: 1.3 %
GFR AFRICAN AMERICAN: >60
GFR NON-AFRICAN AMERICAN: >60
GLUCOSE BLD-MCNC: 87 MG/DL (ref 74–109)
HCT VFR BLD CALC: 48.5 % (ref 42–52)
HDLC SERPL-MCNC: 41 MG/DL (ref 40–59)
HEMOGLOBIN: 16.5 G/DL (ref 14–18)
LDL CHOLESTEROL CALCULATED: 65 MG/DL (ref 0–129)
LYMPHOCYTES ABSOLUTE: 1.5 K/UL (ref 1–4.8)
LYMPHOCYTES RELATIVE PERCENT: 19.6 %
MCH RBC QN AUTO: 31.6 PG (ref 27–31.3)
MCHC RBC AUTO-ENTMCNC: 34.1 % (ref 33–37)
MCV RBC AUTO: 92.5 FL (ref 80–100)
MONOCYTES ABSOLUTE: 0.5 K/UL (ref 0.2–0.8)
MONOCYTES RELATIVE PERCENT: 7 %
NEUTROPHILS ABSOLUTE: 5.5 K/UL (ref 1.4–6.5)
NEUTROPHILS RELATIVE PERCENT: 71.5 %
PDW BLD-RTO: 12.8 % (ref 11.5–14.5)
PLATELET # BLD: 206 K/UL (ref 130–400)
POTASSIUM SERPL-SCNC: 4.3 MEQ/L (ref 3.5–5.1)
RBC # BLD: 5.24 M/UL (ref 4.7–6.1)
SODIUM BLD-SCNC: 141 MEQ/L (ref 132–144)
TRIGL SERPL-MCNC: 184 MG/DL (ref 0–200)
WBC # BLD: 7.6 K/UL (ref 4.8–10.8)

## 2018-09-11 PROCEDURE — 90662 IIV NO PRSV INCREASED AG IM: CPT | Performed by: FAMILY MEDICINE

## 2018-09-11 PROCEDURE — G0008 ADMIN INFLUENZA VIRUS VAC: HCPCS | Performed by: FAMILY MEDICINE

## 2018-09-20 ENCOUNTER — ANTI-COAG VISIT (OUTPATIENT)
Dept: FAMILY MEDICINE CLINIC | Age: 73
End: 2018-09-20

## 2018-09-20 ENCOUNTER — OFFICE VISIT (OUTPATIENT)
Dept: FAMILY MEDICINE CLINIC | Age: 73
End: 2018-09-20
Payer: MEDICARE

## 2018-09-20 VITALS
HEIGHT: 70 IN | OXYGEN SATURATION: 98 % | DIASTOLIC BLOOD PRESSURE: 78 MMHG | WEIGHT: 222.4 LBS | SYSTOLIC BLOOD PRESSURE: 116 MMHG | HEART RATE: 69 BPM | BODY MASS INDEX: 31.84 KG/M2

## 2018-09-20 DIAGNOSIS — E78.2 MIXED HYPERLIPIDEMIA: ICD-10-CM

## 2018-09-20 DIAGNOSIS — I10 ESSENTIAL HYPERTENSION: Primary | ICD-10-CM

## 2018-09-20 DIAGNOSIS — M16.0 PRIMARY OSTEOARTHRITIS OF BOTH HIPS: ICD-10-CM

## 2018-09-20 PROCEDURE — G8427 DOCREV CUR MEDS BY ELIG CLIN: HCPCS | Performed by: FAMILY MEDICINE

## 2018-09-20 PROCEDURE — 1036F TOBACCO NON-USER: CPT | Performed by: FAMILY MEDICINE

## 2018-09-20 PROCEDURE — G8598 ASA/ANTIPLAT THER USED: HCPCS | Performed by: FAMILY MEDICINE

## 2018-09-20 PROCEDURE — 3017F COLORECTAL CA SCREEN DOC REV: CPT | Performed by: FAMILY MEDICINE

## 2018-09-20 PROCEDURE — G8417 CALC BMI ABV UP PARAM F/U: HCPCS | Performed by: FAMILY MEDICINE

## 2018-09-20 PROCEDURE — 1123F ACP DISCUSS/DSCN MKR DOCD: CPT | Performed by: FAMILY MEDICINE

## 2018-09-20 PROCEDURE — 4040F PNEUMOC VAC/ADMIN/RCVD: CPT | Performed by: FAMILY MEDICINE

## 2018-09-20 PROCEDURE — 99214 OFFICE O/P EST MOD 30 MIN: CPT | Performed by: FAMILY MEDICINE

## 2018-09-20 PROCEDURE — 1101F PT FALLS ASSESS-DOCD LE1/YR: CPT | Performed by: FAMILY MEDICINE

## 2018-09-20 ASSESSMENT — ENCOUNTER SYMPTOMS
BLURRED VISION: 0
ABDOMINAL PAIN: 0

## 2018-09-20 NOTE — PROGRESS NOTES
Subjective  Fredrich Reveal, 68 y.o. male presents today with:  Chief Complaint   Patient presents with    6 Month Follow-Up       Hypertension   This is a chronic problem. The current episode started more than 1 year ago. The problem has been rapidly improving since onset. The problem is controlled. Pertinent negatives include no blurred vision, chest pain or palpitations. There are no associated agents to hypertension. Risk factors for coronary artery disease include dyslipidemia and male gender. Past treatments include angiotensin blockers. The current treatment provides significant improvement. There are no compliance problems. There is no history of kidney disease. There is no history of chronic renal disease. Here for f/u on chronic problems including HTN, hyperlipidemia, ED and chronic otitis externa for which he sees Dr. August Apley. C/o right hip pain. Had xrays end of June showing OA. Review of Systems   Constitutional: Negative for fever. Eyes: Negative for blurred vision. Cardiovascular: Negative for chest pain and palpitations. Gastrointestinal: Negative for abdominal pain. Skin: Negative for rash.        Past Medical History:   Diagnosis Date    BPH (benign prostatic hypertrophy)     CAD (coronary artery disease)     Cerebrovascular disease     Chronic otitis externa 2/11/2016    Chronic suppurative otitis media 11/11/2015    Ear injury 2006    [facility]    ED (erectile dysfunction) 11/28/2014    Hearing loss     History of nephrolithiasis     History of pneumonia 2004    History of prediabetes     Hyperlipidemia 10/10/2013    Hypertension     Hypogonadism in male     Mass of chest wall, left     Optic neuropathy, ischemic     Osteoarthritis of both hips     Paresthesias     Perforation of right tympanic membrane     chronic; Dr. George Elliott PSA (prostate specific antigen) 2007- 1.12    Seborrheic keratosis 10/10/2013    Sensorineural hearing loss,

## 2018-11-07 RX ORDER — FINASTERIDE 5 MG/1
5 TABLET, FILM COATED ORAL DAILY
Qty: 90 TABLET | Refills: 3 | Status: SHIPPED | OUTPATIENT
Start: 2018-11-07 | End: 2019-05-13 | Stop reason: SDUPTHER

## 2018-11-26 DIAGNOSIS — N13.8 BPH WITH OBSTRUCTION/LOWER URINARY TRACT SYMPTOMS: Primary | ICD-10-CM

## 2018-11-26 DIAGNOSIS — N40.1 BPH WITH OBSTRUCTION/LOWER URINARY TRACT SYMPTOMS: Primary | ICD-10-CM

## 2018-11-26 RX ORDER — TAMSULOSIN HYDROCHLORIDE 0.4 MG/1
0.4 CAPSULE ORAL DAILY
Qty: 90 CAPSULE | Refills: 3 | Status: SHIPPED | OUTPATIENT
Start: 2018-11-26 | End: 2019-12-11

## 2018-11-26 NOTE — TELEPHONE ENCOUNTER
----- Message from Snadi Nagel sent at 11/26/2018  9:20 AM EST -----  Contact: [phone]  Pt requesting refill of flomax be sent to [facility]

## 2018-12-07 ENCOUNTER — TELEPHONE (OUTPATIENT)
Dept: UROLOGY | Age: 73
End: 2018-12-07

## 2018-12-07 DIAGNOSIS — N40.1 BPH WITH OBSTRUCTION/LOWER URINARY TRACT SYMPTOMS: Primary | ICD-10-CM

## 2018-12-07 DIAGNOSIS — N13.8 BPH WITH OBSTRUCTION/LOWER URINARY TRACT SYMPTOMS: Primary | ICD-10-CM

## 2018-12-10 DIAGNOSIS — N40.1 BPH WITH OBSTRUCTION/LOWER URINARY TRACT SYMPTOMS: ICD-10-CM

## 2018-12-10 DIAGNOSIS — N13.8 BPH WITH OBSTRUCTION/LOWER URINARY TRACT SYMPTOMS: ICD-10-CM

## 2018-12-10 LAB — PROSTATE SPECIFIC ANTIGEN: 1.42 NG/ML (ref 0–6.22)

## 2018-12-11 ENCOUNTER — OFFICE VISIT (OUTPATIENT)
Dept: UROLOGY | Age: 73
End: 2018-12-11
Payer: MEDICARE

## 2018-12-11 VITALS
SYSTOLIC BLOOD PRESSURE: 132 MMHG | HEART RATE: 83 BPM | BODY MASS INDEX: 31.78 KG/M2 | WEIGHT: 222 LBS | DIASTOLIC BLOOD PRESSURE: 80 MMHG | HEIGHT: 70 IN

## 2018-12-11 DIAGNOSIS — N13.8 BPH WITH OBSTRUCTION/LOWER URINARY TRACT SYMPTOMS: Primary | ICD-10-CM

## 2018-12-11 DIAGNOSIS — N40.1 BPH WITH OBSTRUCTION/LOWER URINARY TRACT SYMPTOMS: Primary | ICD-10-CM

## 2018-12-11 PROCEDURE — 1101F PT FALLS ASSESS-DOCD LE1/YR: CPT | Performed by: UROLOGY

## 2018-12-11 PROCEDURE — 3017F COLORECTAL CA SCREEN DOC REV: CPT | Performed by: UROLOGY

## 2018-12-11 PROCEDURE — G8482 FLU IMMUNIZE ORDER/ADMIN: HCPCS | Performed by: UROLOGY

## 2018-12-11 PROCEDURE — 1036F TOBACCO NON-USER: CPT | Performed by: UROLOGY

## 2018-12-11 PROCEDURE — 1123F ACP DISCUSS/DSCN MKR DOCD: CPT | Performed by: UROLOGY

## 2018-12-11 PROCEDURE — 99213 OFFICE O/P EST LOW 20 MIN: CPT | Performed by: UROLOGY

## 2018-12-11 PROCEDURE — G8417 CALC BMI ABV UP PARAM F/U: HCPCS | Performed by: UROLOGY

## 2018-12-11 PROCEDURE — G8427 DOCREV CUR MEDS BY ELIG CLIN: HCPCS | Performed by: UROLOGY

## 2018-12-11 PROCEDURE — G8598 ASA/ANTIPLAT THER USED: HCPCS | Performed by: UROLOGY

## 2018-12-11 PROCEDURE — 4040F PNEUMOC VAC/ADMIN/RCVD: CPT | Performed by: UROLOGY

## 2018-12-11 RX ORDER — SIMVASTATIN 20 MG
TABLET ORAL
Qty: 90 TABLET | Refills: 3 | Status: SHIPPED | OUTPATIENT
Start: 2018-12-11 | End: 2019-12-07 | Stop reason: SDUPTHER

## 2018-12-11 ASSESSMENT — ENCOUNTER SYMPTOMS: SHORTNESS OF BREATH: 0

## 2019-01-21 ENCOUNTER — OFFICE VISIT (OUTPATIENT)
Dept: FAMILY MEDICINE CLINIC | Age: 74
End: 2019-01-21
Payer: MEDICARE

## 2019-01-21 VITALS
HEIGHT: 70 IN | SYSTOLIC BLOOD PRESSURE: 120 MMHG | TEMPERATURE: 98.8 F | BODY MASS INDEX: 31.98 KG/M2 | DIASTOLIC BLOOD PRESSURE: 70 MMHG | OXYGEN SATURATION: 96 % | HEART RATE: 74 BPM | WEIGHT: 223.4 LBS

## 2019-01-21 DIAGNOSIS — I25.10 CORONARY ARTERY DISEASE INVOLVING NATIVE CORONARY ARTERY OF NATIVE HEART WITHOUT ANGINA PECTORIS: ICD-10-CM

## 2019-01-21 DIAGNOSIS — N40.0 BENIGN PROSTATIC HYPERPLASIA, UNSPECIFIED WHETHER LOWER URINARY TRACT SYMPTOMS PRESENT: ICD-10-CM

## 2019-01-21 DIAGNOSIS — I10 ESSENTIAL HYPERTENSION: Primary | ICD-10-CM

## 2019-01-21 DIAGNOSIS — E78.2 MIXED HYPERLIPIDEMIA: ICD-10-CM

## 2019-01-21 DIAGNOSIS — H90.3 SENSORINEURAL HEARING LOSS, BILATERAL: ICD-10-CM

## 2019-01-21 DIAGNOSIS — N52.9 ERECTILE DYSFUNCTION, UNSPECIFIED ERECTILE DYSFUNCTION TYPE: ICD-10-CM

## 2019-01-21 PROCEDURE — G8482 FLU IMMUNIZE ORDER/ADMIN: HCPCS | Performed by: NURSE PRACTITIONER

## 2019-01-21 PROCEDURE — G8427 DOCREV CUR MEDS BY ELIG CLIN: HCPCS | Performed by: NURSE PRACTITIONER

## 2019-01-21 PROCEDURE — G8417 CALC BMI ABV UP PARAM F/U: HCPCS | Performed by: NURSE PRACTITIONER

## 2019-01-21 PROCEDURE — 1123F ACP DISCUSS/DSCN MKR DOCD: CPT | Performed by: NURSE PRACTITIONER

## 2019-01-21 PROCEDURE — 99214 OFFICE O/P EST MOD 30 MIN: CPT | Performed by: NURSE PRACTITIONER

## 2019-01-21 PROCEDURE — 1036F TOBACCO NON-USER: CPT | Performed by: NURSE PRACTITIONER

## 2019-01-21 PROCEDURE — G8598 ASA/ANTIPLAT THER USED: HCPCS | Performed by: NURSE PRACTITIONER

## 2019-01-21 PROCEDURE — 4040F PNEUMOC VAC/ADMIN/RCVD: CPT | Performed by: NURSE PRACTITIONER

## 2019-01-21 PROCEDURE — 3017F COLORECTAL CA SCREEN DOC REV: CPT | Performed by: NURSE PRACTITIONER

## 2019-01-21 PROCEDURE — 1101F PT FALLS ASSESS-DOCD LE1/YR: CPT | Performed by: NURSE PRACTITIONER

## 2019-01-21 ASSESSMENT — ENCOUNTER SYMPTOMS
DIARRHEA: 0
SHORTNESS OF BREATH: 0
CONSTIPATION: 0
COUGH: 0

## 2019-02-08 DIAGNOSIS — N40.1 BPH WITH OBSTRUCTION/LOWER URINARY TRACT SYMPTOMS: Primary | ICD-10-CM

## 2019-02-08 DIAGNOSIS — N13.8 BPH WITH OBSTRUCTION/LOWER URINARY TRACT SYMPTOMS: Primary | ICD-10-CM

## 2019-02-08 RX ORDER — FINASTERIDE 5 MG/1
5 TABLET, FILM COATED ORAL DAILY
Qty: 90 TABLET | Refills: 3 | Status: SHIPPED | OUTPATIENT
Start: 2019-02-08 | End: 2020-05-07

## 2019-04-08 ENCOUNTER — OFFICE VISIT (OUTPATIENT)
Dept: CARDIOLOGY CLINIC | Age: 74
End: 2019-04-08
Payer: MEDICARE

## 2019-04-08 VITALS
BODY MASS INDEX: 33.27 KG/M2 | WEIGHT: 232.4 LBS | DIASTOLIC BLOOD PRESSURE: 84 MMHG | RESPIRATION RATE: 22 BRPM | SYSTOLIC BLOOD PRESSURE: 122 MMHG | OXYGEN SATURATION: 98 % | HEART RATE: 85 BPM | HEIGHT: 70 IN

## 2019-04-08 DIAGNOSIS — I10 ESSENTIAL HYPERTENSION: ICD-10-CM

## 2019-04-08 DIAGNOSIS — I67.9 CEREBROVASCULAR DISEASE: ICD-10-CM

## 2019-04-08 DIAGNOSIS — I25.10 CORONARY ARTERY DISEASE INVOLVING NATIVE CORONARY ARTERY OF NATIVE HEART WITHOUT ANGINA PECTORIS: Primary | ICD-10-CM

## 2019-04-08 PROCEDURE — G8598 ASA/ANTIPLAT THER USED: HCPCS | Performed by: INTERNAL MEDICINE

## 2019-04-08 PROCEDURE — G8427 DOCREV CUR MEDS BY ELIG CLIN: HCPCS | Performed by: INTERNAL MEDICINE

## 2019-04-08 PROCEDURE — 3017F COLORECTAL CA SCREEN DOC REV: CPT | Performed by: INTERNAL MEDICINE

## 2019-04-08 PROCEDURE — 1123F ACP DISCUSS/DSCN MKR DOCD: CPT | Performed by: INTERNAL MEDICINE

## 2019-04-08 PROCEDURE — G8417 CALC BMI ABV UP PARAM F/U: HCPCS | Performed by: INTERNAL MEDICINE

## 2019-04-08 PROCEDURE — 99214 OFFICE O/P EST MOD 30 MIN: CPT | Performed by: INTERNAL MEDICINE

## 2019-04-08 PROCEDURE — 1036F TOBACCO NON-USER: CPT | Performed by: INTERNAL MEDICINE

## 2019-04-08 PROCEDURE — 4040F PNEUMOC VAC/ADMIN/RCVD: CPT | Performed by: INTERNAL MEDICINE

## 2019-04-08 ASSESSMENT — ENCOUNTER SYMPTOMS
CHEST TIGHTNESS: 0
APNEA: 0
BLOOD IN STOOL: 0
NAUSEA: 0
VOMITING: 0
DIARRHEA: 0
SHORTNESS OF BREATH: 0

## 2019-04-08 NOTE — PROGRESS NOTES
[facility] CARDIOLOGY OFFICE FOLLOW-UP      Patient: Keshawn Gomez  YOB: 1945  MRN: [id number]    Chief Complaint:  Chief Complaint   Patient presents with    1 Year Follow Up    Coronary Artery Disease    Hypertension         Subjective/HPI:  4/8/19: Patient presents today for follow-up of coronary artery disease. Has totally occluded right by previous cardiac catheterization. Very active. Occasional chest pain. Has hypertension and hypertensive heart disease. He'll be scheduled to have a Lexiscan and then follow with me      4/9/2018: Follow-up of coronary artery disease. Has totally occluded right coronary artery. No angina. He has atypical chest pain below her left side of the breast going to the back. He is going to be seeing a chiropractor. Has hypertension that stable. See me in one year.      4/10/17: For follow-up of CAD. He has totally occluded right coronary artery. Collaterals from the left. No angina congestive heart failure symptoms of syncope. Fairly active. Breathing house has hypertension and hyperlipidemia that is stable. See me in 6 months     10/10/2016: 12 follow-up of CAD. He is noted to have occluded right coronary artery with collaterals from the left. He went to [place] and had a great time. No chest pain and congestive heart failure symptoms or syncope. He is fairly active. He is status post lipoma removal by [facility]. Has HLP. See in [facility]     4/11/2016: For followup of CAD. RCA occluded. Collaterals from L. No angina,CHF or syncopy. Had cyst/lipoma removed from chest by Dr Christin De Santiago. Has prior history of CVA. No dizziness or syncopy. Will mail him a CD of his cath to his home.            Past Medical History:   Diagnosis Date    BPH (benign prostatic hypertrophy)     CAD (coronary artery disease)     Cerebrovascular disease     Chronic otitis externa 2/11/2016    Chronic suppurative otitis media 11/11/2015    Ear injury 2006    [facility]    ED (erectile dysfunction) 11/28/2014    Hearing loss     History of nephrolithiasis     History of pneumonia 2004    History of prediabetes     Hyperlipidemia 10/10/2013    Hypertension     Hypogonadism in male     Mass of chest wall, left     Optic neuropathy, ischemic     Osteoarthritis of both hips     Paresthesias     Perforation of right tympanic membrane     chronic; Dr. Peter Whiting PSA (prostate specific antigen) 2007- 1.12    Seborrheic keratosis 10/10/2013    Sensorineural hearing loss, bilateral     Stable angina (HCC)     Trigger thumb, left thumb        Past Surgical History:   Procedure Laterality Date    CARPAL TUNNEL RELEASE Right [facility]/30/2017    [facility]    CARPAL TUNNEL RELEASE Left 06/2017    CCRAVEN PEARL MD    COLONOSCOPY  6/2015    Dr. Francisca Henry      lower eyelids   [facility]    LIPOMA RESECTION  3/22/16    DR. BARRAGAN    OTHER SURGICAL HISTORY Left 1/2015    hydrocelectomy       Family History   Problem Relation Age of Onset    Stroke Father        Social History     Socioeconomic History    Marital status:       Spouse name: Not on file    Number of children: 2    Years of education: Not on file    Highest education level: Not on file   Occupational History    Not on file   Social Needs    Financial resource strain: Not on file    Food insecurity:     Worry: Not on file     Inability: Not on file    Transportation needs:     Medical: Not on file     Non-medical: Not on file   Tobacco Use    Smoking status: Never Smoker    Smokeless tobacco: Never Used   Substance and Sexual Activity    Alcohol use: Yes     Comment: occasionally    Drug use: No    Sexual activity: Not Currently   Lifestyle    Physical activity:     Days per week: Not on file     Minutes per session: Not on file    Stress: Not on file   Relationships    Social connections:     Talks on phone: Not on file     Gets together: Not on file     Attends Restorationism service: Not on file     Active member of club or organization: Not on file     Attends meetings of clubs or organizations: Not on file     Relationship status: Not on file    Intimate partner violence:     Fear of current or ex partner: Not on file     Emotionally abused: Not on file     Physically abused: Not on file     Forced sexual activity: Not on file   Other Topics Concern    Not on file   Social History Narrative    2 boys in [place]. Retired from [employer]. No Known Allergies    Current Outpatient Medications   Medication Sig Dispense Refill    finasteride (PROSCAR) 5 MG tablet Take 1 tablet by mouth daily 90 tablet 3    simvastatin (ZOCOR) 20 MG tablet TAKE 1 TABLET NIGHTLY 90 tablet 3    Naphazoline-Glycerin (CLEAR EYES COOLING COMFORT OP) Apply to eye      tamsulosin (FLOMAX) 0.4 MG capsule Take 1 capsule by mouth daily 90 capsule 3    finasteride (PROSCAR) 5 MG tablet Take 1 tablet by mouth daily 90 tablet 3    irbesartan (AVAPRO) 150 MG tablet TAKE 1 TABLET DAILY 90 tablet 3    sildenafil (VIAGRA) 50 MG tablet Take 1 tablet by mouth as needed for Erectile Dysfunction 12 tablet 2    Ascorbic Acid (VITAMIN C) 500 MG tablet Take 500 mg by mouth daily      Multiple Vitamins-Minerals (MULTIVITAMIN PO) Take by mouth      aspirin 81 MG tablet Take 81 mg by mouth daily. No current facility-administered medications for this visit. Review of Systems:   Review of Systems   Constitutional: Negative for activity change, appetite change, diaphoresis, fatigue and unexpected weight change. HENT: Negative for facial swelling, nosebleeds, trouble swallowing and voice change. Respiratory: Negative for apnea, chest tightness, shortness of breath and wheezing. Cardiovascular: Negative for chest pain, palpitations and leg swelling. Gastrointestinal: Negative for abdominal distention, anal bleeding, blood in stool, diarrhea, nausea and vomiting. MCH 31.6 09/11/2018    MCHC 34.1 09/11/2018    RDW 12.8 09/11/2018     09/11/2018    MPV 9.7 01/12/2015     Lipids:  Lab Results   Component Value Date    CHOL 143 09/11/2018    CHOL 129 09/19/2017    CHOL 148 11/10/2016     Lab Results   Component Value Date    TRIG 184 09/11/2018    TRIG 114 09/19/2017    TRIG 94 11/10/2016     Lab Results   Component Value Date    HDL 41 09/11/2018    HDL 45 09/19/2017    HDL 44 11/10/2016     Lab Results   Component Value Date    LDLCALC 65 09/11/2018    LDLCALC 61 09/19/2017    LDLCALC 85 11/10/2016     No results found for: LABVLDL, VLDL  Lab Results   Component Value Date    CHOLHDLRATIO 4.7 03/12/2012     CMP:    Lab Results   Component Value Date     09/11/2018    K 4.3 09/11/2018     09/11/2018    CO2 24 09/11/2018    BUN 11 09/11/2018    CREATININE 0.80 09/11/2018    GFRAA >60.0 09/11/2018    LABGLOM >60.0 09/11/2018    GLUCOSE 87 09/11/2018    GLUCOSE 105 03/12/2012    PROT 6.5 05/27/2015    LABALBU 3.9 05/27/2015    LABALBU 4.4 03/12/2012    CALCIUM 9.0 09/11/2018    BILITOT 0.4 05/27/2015    ALKPHOS 68 05/27/2015    AST 22 09/11/2018    ALT 26 09/11/2018     BMP:    Lab Results   Component Value Date     09/11/2018    K 4.3 09/11/2018     09/11/2018    CO2 24 09/11/2018    BUN 11 09/11/2018    LABALBU 3.9 05/27/2015    LABALBU 4.4 03/12/2012    CREATININE 0.80 09/11/2018    CALCIUM 9.0 09/11/2018    GFRAA >60.0 09/11/2018    LABGLOM >60.0 09/11/2018    GLUCOSE 87 09/11/2018    GLUCOSE 105 03/12/2012     Magnesium:  No results found for: MG  TSH:  Lab Results   Component Value Date    TSH 2.750 11/10/2016       Patient Active Problem List   Diagnosis    Hypertension    Benign prostatic hyperplasia    Cerebrovascular disease    ED (erectile dysfunction)    Sensorineural hearing loss, bilateral    Hyperlipidemia    CAD (coronary artery disease)    Seborrheic keratoses    Chronic otitis externa    Chronic suppurative otitis media  Trigger thumb, left thumb    Hypogonadism in male    Perforation of right tympanic membrane    Osteoarthritis of both hips       There are no discontinued medications. Modified Medications    No medications on file       No orders of the defined types were placed in this encounter. Assessment:    1. Coronary artery disease involving native coronary artery of native heart without angina pectoris  - NM Myocardial Spect Rest Exercise or Rx; Future    2. Essential hypertension  - NM Myocardial Spect Rest Exercise or Rx; Future    3. Cerebrovascular disease  - NM Myocardial Spect Rest Exercise or Rx; Future       Plan:   Patient to have lexiscan stress test at [facility] on 4/12/19. Stay on same medications. See me in 2 months. This note was partially generated using Dragon voice recognition system, and there may be some incorrect words, spellings, punctuation that were not noticed in checking the note before saving.         Electronically signed by Yuri Rothman MD on 4/9/2019 at 8:54 AM

## 2019-04-11 ASSESSMENT — ENCOUNTER SYMPTOMS
WHEEZING: 0
VOICE CHANGE: 0
FACIAL SWELLING: 0
TROUBLE SWALLOWING: 0
COLOR CHANGE: 0
ANAL BLEEDING: 0
ABDOMINAL DISTENTION: 0

## 2019-04-12 ENCOUNTER — HOSPITAL ENCOUNTER (OUTPATIENT)
Dept: NUCLEAR MEDICINE | Age: 74
Discharge: HOME | End: 2019-04-14
Payer: MEDICARE

## 2019-04-12 ENCOUNTER — HOSPITAL ENCOUNTER (OUTPATIENT)
Dept: NON INVASIVE DIAGNOSTICS | Age: 74
Discharge: HOME | End: 2019-04-12
Payer: MEDICARE

## 2019-04-12 VITALS — HEART RATE: 82 BPM | DIASTOLIC BLOOD PRESSURE: 83 MMHG | SYSTOLIC BLOOD PRESSURE: 128 MMHG

## 2019-04-12 DIAGNOSIS — I67.9 CEREBROVASCULAR DISEASE: ICD-10-CM

## 2019-04-12 DIAGNOSIS — I10 ESSENTIAL HYPERTENSION: ICD-10-CM

## 2019-04-12 DIAGNOSIS — I25.10 CORONARY ARTERY DISEASE INVOLVING NATIVE CORONARY ARTERY OF NATIVE HEART WITHOUT ANGINA PECTORIS: ICD-10-CM

## 2019-04-12 DIAGNOSIS — I25.10 ATHEROSCLEROSIS OF NATIVE CORONARY ARTERY OF NATIVE HEART WITHOUT ANGINA PECTORIS: ICD-10-CM

## 2019-04-12 DIAGNOSIS — I10 ESSENTIAL HYPERTENSION, BENIGN: ICD-10-CM

## 2019-04-12 PROCEDURE — 3430000000 HC RX DIAGNOSTIC RADIOPHARMACEUTICAL: Performed by: INTERNAL MEDICINE

## 2019-04-12 PROCEDURE — 2580000003 HC RX 258: Performed by: INTERNAL MEDICINE

## 2019-04-12 PROCEDURE — 78452 HT MUSCLE IMAGE SPECT MULT: CPT

## 2019-04-12 PROCEDURE — 93017 CV STRESS TEST TRACING ONLY: CPT

## 2019-04-12 PROCEDURE — 6360000004 HC RX CONTRAST MEDICATION: Performed by: INTERNAL MEDICINE

## 2019-04-12 PROCEDURE — A9502 TC99M TETROFOSMIN: HCPCS | Performed by: INTERNAL MEDICINE

## 2019-04-12 PROCEDURE — 93018 CV STRESS TEST I&R ONLY: CPT | Performed by: INTERNAL MEDICINE

## 2019-04-12 RX ORDER — SODIUM CHLORIDE 0.9 % (FLUSH) 0.9 %
10 SYRINGE (ML) INJECTION PRN
Status: DISCONTINUED | OUTPATIENT
Start: 2019-04-12 | End: 2019-04-15 | Stop reason: HOSPADM

## 2019-04-12 RX ADMIN — REGADENOSON 0.4 MG: 0.08 INJECTION, SOLUTION INTRAVENOUS at 10:03

## 2019-04-12 RX ADMIN — TETROFOSMIN 32 MILLICURIE: 1.38 INJECTION, POWDER, LYOPHILIZED, FOR SOLUTION INTRAVENOUS at 10:04

## 2019-04-12 RX ADMIN — TETROFOSMIN 11.3 MILLICURIE: 1.38 INJECTION, POWDER, LYOPHILIZED, FOR SOLUTION INTRAVENOUS at 08:22

## 2019-04-12 RX ADMIN — Medication 10 ML: at 10:05

## 2019-04-12 RX ADMIN — Medication 10 ML: at 10:03

## 2019-04-12 RX ADMIN — Medication 10 ML: at 08:23

## 2019-04-15 NOTE — PROCEDURES
[facility] [address] [place] [address], [address] [facility]                              CARDIAC STRESS TEST    PATIENT NAME: Ron Rolle                    :        1945  MED REC NO:   [id number]                            ROOM:  ACCOUNT NO:   [de-identified]                           ADMIT DATE: 2019  PROVIDER:     Benjie Mena MD    CARDIOVASCULAR DIAGNOSTIC DEPARTMENT    DATE OF STUDY:  2019    ORDERING PROVIDER:   _____ and Benjie Mena MD    [facility] STRESS TESTING    INDICATIONS:  Hypertension, coronary artery disease. TECHNIQUE:  At rest, the patient was injected with 11.3 mCi of Myoview. Resting images were obtained. The patient was then given 0.4 mg of  Lexiscan followed by administration of 32 mCi of Myoview. Stress  tomographic images were then obtained. Left ventricular ejection  fraction and gated wall motion were acquired. RESULTS:  Resting heart rate is 78 beats per minute, maximum heart rate  achieved was 92 beats per minute. Occasional PVCs are seen. No  diagnostic ST segment changes were noted. IMAGING RESULTS:  Review of rest and stress tomographic images revealed  homogenous myocardial perfusion with no evidence of prior myocardial  infarction or ischemia. Left ventricular ejection fraction normal at  62%. TID ratio is 1.05 which is within normal limits. IMPRESSION:  1. Homogenous myocardial perfusion with no evidence of prior myocardial  infarction or ischemia. 2.  Normal left ventricular ejection fraction. 3.  Normal TID ratio.       Pepito Yeboah MD    D: 04/15/2019 #8:58:22       T: 04/15/2019 9:43:00     [id number]  Job#: [id number]     Doc#: [id number]    CC:

## 2019-05-13 ENCOUNTER — OFFICE VISIT (OUTPATIENT)
Dept: FAMILY MEDICINE CLINIC | Age: 74
End: 2019-05-13
Payer: MEDICARE

## 2019-05-13 VITALS
WEIGHT: 225.4 LBS | TEMPERATURE: 97.8 F | BODY MASS INDEX: 32.27 KG/M2 | OXYGEN SATURATION: 96 % | HEIGHT: 70 IN | SYSTOLIC BLOOD PRESSURE: 118 MMHG | HEART RATE: 82 BPM | DIASTOLIC BLOOD PRESSURE: 68 MMHG

## 2019-05-13 DIAGNOSIS — S16.1XXA STRAIN OF NECK MUSCLE, INITIAL ENCOUNTER: Primary | ICD-10-CM

## 2019-05-13 PROCEDURE — G8427 DOCREV CUR MEDS BY ELIG CLIN: HCPCS | Performed by: NURSE PRACTITIONER

## 2019-05-13 PROCEDURE — G8598 ASA/ANTIPLAT THER USED: HCPCS | Performed by: NURSE PRACTITIONER

## 2019-05-13 PROCEDURE — 1036F TOBACCO NON-USER: CPT | Performed by: NURSE PRACTITIONER

## 2019-05-13 PROCEDURE — 1123F ACP DISCUSS/DSCN MKR DOCD: CPT | Performed by: NURSE PRACTITIONER

## 2019-05-13 PROCEDURE — 3017F COLORECTAL CA SCREEN DOC REV: CPT | Performed by: NURSE PRACTITIONER

## 2019-05-13 PROCEDURE — 4040F PNEUMOC VAC/ADMIN/RCVD: CPT | Performed by: NURSE PRACTITIONER

## 2019-05-13 PROCEDURE — G8417 CALC BMI ABV UP PARAM F/U: HCPCS | Performed by: NURSE PRACTITIONER

## 2019-05-13 PROCEDURE — 99213 OFFICE O/P EST LOW 20 MIN: CPT | Performed by: NURSE PRACTITIONER

## 2019-05-13 RX ORDER — TIZANIDINE 4 MG/1
4 TABLET ORAL NIGHTLY PRN
Qty: 14 TABLET | Refills: 0 | Status: SHIPPED | OUTPATIENT
Start: 2019-05-13 | End: 2019-05-20 | Stop reason: SDUPTHER

## 2019-05-13 RX ORDER — METHYLPREDNISOLONE 4 MG/1
TABLET ORAL
Qty: 1 KIT | Refills: 0 | Status: SHIPPED | OUTPATIENT
Start: 2019-05-13 | End: 2019-05-19

## 2019-05-13 ASSESSMENT — PATIENT HEALTH QUESTIONNAIRE - PHQ9
SUM OF ALL RESPONSES TO PHQ QUESTIONS 1-9: 0
SUM OF ALL RESPONSES TO PHQ QUESTIONS 1-9: 0
2. FEELING DOWN, DEPRESSED OR HOPELESS: 0
SUM OF ALL RESPONSES TO PHQ9 QUESTIONS 1 & 2: 0
1. LITTLE INTEREST OR PLEASURE IN DOING THINGS: 0

## 2019-05-13 NOTE — PROGRESS NOTES
Subjective  Chief Complaint   Patient presents with    Neck Pain     X 1 week or so. pt states that that Albert Metcalf had given shot for bursitis of the hip and helped with neck pain. pt notes that there is no injury to neck but struggling with arthritis in the neck?  Arthritis     pt notes that he stopped into [facility] to get appt and could not        Neck Pain    This is a recurrent problem. The current episode started 1 to 4 weeks ago. The problem occurs constantly. The problem has been unchanged. The pain is associated with nothing. The pain is present in the left side. The quality of the pain is described as aching. The pain is at a severity of 7/10. The pain is moderate. Nothing aggravates the symptoms. The pain is same all the time. Pertinent negatives include no headaches. He has tried NSAIDs for the symptoms. The treatment provided no relief.         Patient Active Problem List    Diagnosis Date Noted    Osteoarthritis of both hips     Perforation of right tympanic membrane     Trigger thumb, left thumb     Hypogonadism in male     Chronic otitis externa 02/11/2016    CAD (coronary artery disease)     Seborrheic keratoses     Sensorineural hearing loss, bilateral     Chronic suppurative otitis media 11/11/2015    ED (erectile dysfunction) 11/28/2014    Hyperlipidemia 10/10/2013    Hypertension     Benign prostatic hyperplasia     Cerebrovascular disease      Past Medical History:   Diagnosis Date    BPH (benign prostatic hypertrophy)     CAD (coronary artery disease)     Cerebrovascular disease     Chronic otitis externa 2/11/2016    Chronic suppurative otitis media 11/11/2015    Ear injury 2006    [facility]    ED (erectile dysfunction) 11/28/2014    Hearing loss     History of nephrolithiasis     History of pneumonia 2004    History of prediabetes     Hyperlipidemia 10/10/2013    Hypertension     Hypogonadism in male     Mass of chest wall, left     Optic neuropathy, ischemic     Osteoarthritis of both hips     Paresthesias     Perforation of right tympanic membrane     chronic; Dr. Mckay Pritchard PSA (prostate specific antigen) 2007- 1.12    Seborrheic keratosis 10/10/2013    Sensorineural hearing loss, bilateral     Stable angina (HCC)     Trigger thumb, left thumb      Past Surgical History:   Procedure Laterality Date    CARPAL TUNNEL RELEASE Right 05/30/2017    ESME PEARL MD    CARPAL TUNNEL RELEASE Left 06/2017    ESME PEARL MD    COLONOSCOPY  6/2015    Dr. Eloisa Monson      lower eyelids   [address] [place] [address]    LIPOMA RESECTION  3/22/16    DR. BARRAGAN    OTHER SURGICAL HISTORY Left 1/2015    hydrocelectomy     Family History   Problem Relation Age of Onset    Stroke Father      Social History     Socioeconomic History    Marital status:       Spouse name: None    Number of children: 2    Years of education: None    Highest education level: None   Occupational History    None   Social Needs    Financial resource strain: None    Food insecurity:     Worry: None     Inability: None    Transportation needs:     Medical: None     Non-medical: None   Tobacco Use    Smoking status: Never Smoker    Smokeless tobacco: Never Used   Substance and Sexual Activity    Alcohol use: Yes     Comment: occasionally    Drug use: No    Sexual activity: Not Currently   Lifestyle    Physical activity:     Days per week: None     Minutes per session: None    Stress: None   Relationships    Social connections:     Talks on phone: None     Gets together: None     Attends Jew service: None     Active member of club or organization: None     Attends meetings of clubs or organizations: None     Relationship status: None    Intimate partner violence:     Fear of current or ex partner: None     Emotionally abused: None     Physically abused: None     Forced sexual activity: None   Other Topics Concern    None   Social History Narrative    2 boys in [place]. Retired from [employer]. Current Outpatient Medications on File Prior to Visit   Medication Sig Dispense Refill    finasteride (PROSCAR) 5 MG tablet Take 1 tablet by mouth daily 90 tablet 3    simvastatin (ZOCOR) 20 MG tablet TAKE 1 TABLET NIGHTLY 90 tablet 3    Naphazoline-Glycerin (CLEAR EYES COOLING COMFORT OP) Apply to eye      tamsulosin (FLOMAX) 0.4 MG capsule Take 1 capsule by mouth daily 90 capsule 3    irbesartan (AVAPRO) 150 MG tablet TAKE 1 TABLET DAILY 90 tablet 3    Ascorbic Acid (VITAMIN C) 500 MG tablet Take 500 mg by mouth daily      Multiple Vitamins-Minerals (MULTIVITAMIN PO) Take by mouth      aspirin 81 MG tablet Take 81 mg by mouth daily. No current facility-administered medications on file prior to visit. No Known Allergies    Review of Systems   Musculoskeletal: Positive for myalgias and neck pain. Neurological: Negative for headaches. Objective  Vitals:    05/13/19 1014   BP: 118/68   Pulse: 82   Temp: 97.8 °F (36.6 °C)   SpO2: 96%   Weight: 225 lb 6.4 oz (102.2 kg)   Height: 5' 10\" (1.778 m)     Physical Exam   Constitutional: He is oriented to person, place, and time. He appears well-developed and well-nourished. HENT:   Head: Normocephalic. Eyes: Pupils are equal, round, and reactive to light. EOM are normal.   Cardiovascular: Normal rate, regular rhythm, normal heart sounds and intact distal pulses. Pulmonary/Chest: Effort normal and breath sounds normal.   Musculoskeletal:        Cervical back: He exhibits decreased range of motion, tenderness, swelling, pain and spasm. He exhibits no bony tenderness, no edema, no deformity, no laceration and normal pulse. Neurological: He is alert and oriented to person, place, and time. Skin: Skin is warm. Psychiatric: He has a normal mood and affect.  His behavior is normal. Judgment and thought content normal.   Nursing note and vitals reviewed. Assessment & Plan     Diagnosis Orders   1. Strain of neck muscle, initial encounter  methylPREDNISolone (MEDROL DOSEPACK) 4 MG tablet    tiZANidine (ZANAFLEX) 4 MG tablet       No orders of the defined types were placed in this encounter. Orders Placed This Encounter   Medications    methylPREDNISolone (MEDROL DOSEPACK) 4 MG tablet     Sig: Take by mouth. Dispense:  1 kit     Refill:  0    tiZANidine (ZANAFLEX) 4 MG tablet     Sig: Take 1 tablet by mouth nightly as needed (muscle spasm)     Dispense:  14 tablet     Refill:  0     Side effects, adverse effects of the medication prescribed today, as well as treatment plan/ rationale and result expectations have been discussed with the patient who expresses understanding and desires to proceed. Close follow up to evaluate treatment results and for coordination of care. I have reviewed the patient's medical history in detail and updated the computerized patient record. As always, patient is advised that if symptoms worsen in any way they will proceed to the nearest emergency room. Return in about 1 week (around 5/20/2019).     GIL Dawson - CNP

## 2019-05-20 ENCOUNTER — OFFICE VISIT (OUTPATIENT)
Dept: FAMILY MEDICINE CLINIC | Age: 74
End: 2019-05-20
Payer: MEDICARE

## 2019-05-20 VITALS
HEIGHT: 70 IN | HEART RATE: 79 BPM | OXYGEN SATURATION: 98 % | SYSTOLIC BLOOD PRESSURE: 136 MMHG | WEIGHT: 223.2 LBS | TEMPERATURE: 98.4 F | BODY MASS INDEX: 31.95 KG/M2 | DIASTOLIC BLOOD PRESSURE: 80 MMHG

## 2019-05-20 DIAGNOSIS — M54.2 CERVICALGIA: Primary | ICD-10-CM

## 2019-05-20 DIAGNOSIS — S16.1XXA STRAIN OF NECK MUSCLE, INITIAL ENCOUNTER: ICD-10-CM

## 2019-05-20 PROCEDURE — 4040F PNEUMOC VAC/ADMIN/RCVD: CPT | Performed by: NURSE PRACTITIONER

## 2019-05-20 PROCEDURE — 1123F ACP DISCUSS/DSCN MKR DOCD: CPT | Performed by: NURSE PRACTITIONER

## 2019-05-20 PROCEDURE — 99213 OFFICE O/P EST LOW 20 MIN: CPT | Performed by: NURSE PRACTITIONER

## 2019-05-20 PROCEDURE — G8427 DOCREV CUR MEDS BY ELIG CLIN: HCPCS | Performed by: NURSE PRACTITIONER

## 2019-05-20 PROCEDURE — G8598 ASA/ANTIPLAT THER USED: HCPCS | Performed by: NURSE PRACTITIONER

## 2019-05-20 PROCEDURE — 3017F COLORECTAL CA SCREEN DOC REV: CPT | Performed by: NURSE PRACTITIONER

## 2019-05-20 PROCEDURE — 1036F TOBACCO NON-USER: CPT | Performed by: NURSE PRACTITIONER

## 2019-05-20 PROCEDURE — G8417 CALC BMI ABV UP PARAM F/U: HCPCS | Performed by: NURSE PRACTITIONER

## 2019-05-20 RX ORDER — TIZANIDINE 4 MG/1
4 TABLET ORAL NIGHTLY PRN
Qty: 21 TABLET | Refills: 0 | Status: SHIPPED | OUTPATIENT
Start: 2019-05-20 | End: 2019-12-11 | Stop reason: ALTCHOICE

## 2019-05-20 NOTE — PROGRESS NOTES
[id number] [facility] 53, 68 y.o. male presents today with:  Chief Complaint   Patient presents with    Other     neck pain on left side/shoulder, 5 on pain scale x 2weeks       HPI   Patient is here for neck pain. Was seen by Vicetna Field last week dx neck muscle strain tx medrol dose pack and Zanaflex  Pain was at 7/10 at that time, now is 5/10 and described as a dull ache. Still in pain and uncomfortable. Still tight, still having spasms. Review of Systems   Constitutional: Negative for chills, diaphoresis and fever. Musculoskeletal: Positive for myalgias, neck pain and neck stiffness. Objective    Vitals:    05/20/19 1857   BP: 136/80   Pulse: 79   Temp: 98.4 °F (36.9 °C)   SpO2: 98%   Weight: 223 lb 3.2 oz (101.2 kg)   Height: 5' 10\" (1.778 m)       Physical Exam   Constitutional: He appears well-developed and well-nourished. No distress. HENT:   Right Ear: External ear normal.   Left Ear: External ear normal.   Mouth/Throat: Oropharynx is clear and moist.   Eyes: Conjunctivae are normal. Right eye exhibits no discharge. Left eye exhibits no discharge. Pulmonary/Chest: Effort normal. No respiratory distress. Musculoskeletal: He exhibits no edema. Right shoulder: He exhibits decreased range of motion, tenderness, pain and spasm. He exhibits no swelling, no effusion, no crepitus, no deformity, no laceration, normal pulse and normal strength. Lymphadenopathy:     He has no cervical adenopathy. Skin: No rash noted. He is not diaphoretic. No erythema. No pallor. Vitals reviewed. POC Testing Today:No results found for this visit on 05/20/19. Assessment & Plan    Diagnosis Orders   1.  Cervicalgia  tiZANidine (ZANAFLEX) 4 MG tablet    XR CERVICAL SPINE (4-5 VIEWS)   2. Strain of neck muscle, initial encounter  tiZANidine (ZANAFLEX) 4 MG tablet       Orders Placed This Encounter   Procedures    XR CERVICAL SPINE (4-5 VIEWS)     Standing Status:   Future     Standing Expiration Date:   5/19/2020     Order Specific Question:   Reason for exam:     Answer:   neck pain, decreased rom,     Reviewed the use of muscle relaxers - Start with a 1/2 tab to evaluate effect. Use the lowest dose possible to achieve relief. Do not drive or operate machinery while taking them. Use ice (15 minutes on, 4x day) for first 48 hours post onset of pain/spasm, then switch to a heating pad. Do not fall asleep with heating pad on. Will follow up with xr results. Return if symptoms worsen or fail to improve, for follow up with your PCP. Side effects and adverse effects of any medication prescribed today, as well as treatment plan/rationale, follow-up care, and result expectations have been discussed with the patient. Expresses understanding and desires to proceed with treatment plan. Discussed signs and symptoms which require immediate follow-up in ED/call to 911. Understanding verbalized. I have reviewed and updated the electronic medical record.     Ekta Betancourt, APRN - CNP

## 2019-06-03 ENCOUNTER — OFFICE VISIT (OUTPATIENT)
Dept: CARDIOLOGY CLINIC | Age: 74
End: 2019-06-03
Payer: MEDICARE

## 2019-06-03 VITALS
OXYGEN SATURATION: 99 % | SYSTOLIC BLOOD PRESSURE: 122 MMHG | DIASTOLIC BLOOD PRESSURE: 80 MMHG | BODY MASS INDEX: 31.64 KG/M2 | HEIGHT: 70 IN | HEART RATE: 87 BPM | RESPIRATION RATE: 20 BRPM | WEIGHT: 221 LBS

## 2019-06-03 DIAGNOSIS — I25.10 CORONARY ARTERY DISEASE INVOLVING NATIVE CORONARY ARTERY OF NATIVE HEART WITHOUT ANGINA PECTORIS: Primary | ICD-10-CM

## 2019-06-03 DIAGNOSIS — I10 ESSENTIAL HYPERTENSION: ICD-10-CM

## 2019-06-03 PROCEDURE — 3017F COLORECTAL CA SCREEN DOC REV: CPT | Performed by: INTERNAL MEDICINE

## 2019-06-03 PROCEDURE — 1036F TOBACCO NON-USER: CPT | Performed by: INTERNAL MEDICINE

## 2019-06-03 PROCEDURE — 4040F PNEUMOC VAC/ADMIN/RCVD: CPT | Performed by: INTERNAL MEDICINE

## 2019-06-03 PROCEDURE — 99213 OFFICE O/P EST LOW 20 MIN: CPT | Performed by: INTERNAL MEDICINE

## 2019-06-03 PROCEDURE — G8417 CALC BMI ABV UP PARAM F/U: HCPCS | Performed by: INTERNAL MEDICINE

## 2019-06-03 PROCEDURE — 1123F ACP DISCUSS/DSCN MKR DOCD: CPT | Performed by: INTERNAL MEDICINE

## 2019-06-03 PROCEDURE — G8598 ASA/ANTIPLAT THER USED: HCPCS | Performed by: INTERNAL MEDICINE

## 2019-06-03 PROCEDURE — G8427 DOCREV CUR MEDS BY ELIG CLIN: HCPCS | Performed by: INTERNAL MEDICINE

## 2019-06-03 ASSESSMENT — ENCOUNTER SYMPTOMS
CHEST TIGHTNESS: 0
NAUSEA: 0
VOMITING: 0
SHORTNESS OF BREATH: 0
DIARRHEA: 0
APNEA: 0
BLOOD IN STOOL: 0

## 2019-06-03 NOTE — PROGRESS NOTES
[address] [facility] OFFICE FOLLOW-UP      Patient: Isabel Ivan  YOB: 1945  MRN: [id number]    Chief Complaint:  Chief Complaint   Patient presents with    Results     Stress test done    Coronary Artery Disease         Subjective/HPI:  6/3/19: Patient presents today for follow-up of coronary artery disease. Has totally occluded right coronary . Very active. Stress test is negative for ischemia. He will see me in 6 months. 4/8/19: Patient presents today for follow-up of coronary artery disease. Has totally occluded right by previous cardiac catheterization. Very active. Occasional chest pain. Has hypertension and hypertensive heart disease. He'll be scheduled to have a Lexiscan and then follow with me      4/9/2018: Follow-up of coronary artery disease. Has totally occluded right coronary artery. No angina. He has atypical chest pain below her left side of the breast going to the back. He is going to be seeing a chiropractor. Has hypertension that stable. See me in one year.      4/10/17: For follow-up of CAD. He has totally occluded right coronary artery. Collaterals from the left. No angina congestive heart failure symptoms of syncope. Fairly active. Breathing house has hypertension and hyperlipidemia that is stable. See me in 6 months     10/10/2016: 12 follow-up of CAD. He is noted to have occluded right coronary artery with collaterals from the left. He went to [place] and had a great time. No chest pain and congestive heart failure symptoms or syncope. He is fairly active. He is status post lipoma removal by [facility]. Has HLP. See in [facility]     4/11/2016: For followup of CAD. RCA occluded. Collaterals from L. No angina,CHF or syncopy. Had cyst/lipoma removed from chest by Dr Loren Chavarria. Has prior history of CVA. No dizziness or syncopy. Will mail him a CD of his cath to his home.            Past Medical History:   Diagnosis Date    BPH (benign prostatic hypertrophy)     CAD (coronary artery None   Relationships    Social connections:     Talks on phone: None     Gets together: None     Attends Tenriism service: None     Active member of club or organization: None     Attends meetings of clubs or organizations: None     Relationship status: None    Intimate partner violence:     Fear of current or ex partner: None     Emotionally abused: None     Physically abused: None     Forced sexual activity: None   Other Topics Concern    None   Social History Narrative    2 boys in [place]. Retired from [employer]. No Known Allergies    Current Outpatient Medications   Medication Sig Dispense Refill    tiZANidine (ZANAFLEX) 4 MG tablet Take 1 tablet by mouth nightly as needed (muscle spasm) 21 tablet 0    finasteride (PROSCAR) 5 MG tablet Take 1 tablet by mouth daily 90 tablet 3    simvastatin (ZOCOR) 20 MG tablet TAKE 1 TABLET NIGHTLY 90 tablet 3    Naphazoline-Glycerin (CLEAR EYES COOLING COMFORT OP) Apply to eye      tamsulosin (FLOMAX) 0.4 MG capsule Take 1 capsule by mouth daily 90 capsule 3    irbesartan (AVAPRO) 150 MG tablet TAKE 1 TABLET DAILY 90 tablet 3    Ascorbic Acid (VITAMIN C) 500 MG tablet Take 500 mg by mouth daily      Multiple Vitamins-Minerals (MULTIVITAMIN PO) Take by mouth      aspirin 81 MG tablet Take 81 mg by mouth daily. No current facility-administered medications for this visit. Review of Systems:   Review of Systems   Constitutional: Negative for diaphoresis and fatigue. HENT: Negative for nosebleeds. Respiratory: Negative for apnea, chest tightness and shortness of breath. Cardiovascular: Negative for chest pain, palpitations and leg swelling. Gastrointestinal: Negative for blood in stool, diarrhea, nausea and vomiting. Musculoskeletal: Negative for myalgias, neck pain and neck stiffness. Neurological: Negative for dizziness, seizures, syncope, weakness, light-headedness, numbness and headaches.    Hematological: Does not bruise/bleed 09/11/2018    HDL 45 09/19/2017    HDL 44 11/10/2016     Lab Results   Component Value Date    LDLCALC 65 09/11/2018    LDLCALC 61 09/19/2017    LDLCALC 85 11/10/2016     No results found for: LABVLDL, VLDL  Lab Results   Component Value Date    CHOLHDLRATIO 4.7 03/12/2012     CMP:    Lab Results   Component Value Date     09/11/2018    K 4.3 09/11/2018     09/11/2018    CO2 24 09/11/2018    BUN 11 09/11/2018    CREATININE 0.80 09/11/2018    GFRAA >60.0 09/11/2018    LABGLOM >60.0 09/11/2018    GLUCOSE 87 09/11/2018    GLUCOSE 105 03/12/2012    PROT 6.5 05/27/2015    LABALBU 3.9 05/27/2015    LABALBU 4.4 03/12/2012    CALCIUM 9.0 09/11/2018    BILITOT 0.4 05/27/2015    ALKPHOS 68 05/27/2015    AST 22 09/11/2018    ALT 26 09/11/2018     BMP:    Lab Results   Component Value Date     09/11/2018    K 4.3 09/11/2018     09/11/2018    CO2 24 09/11/2018    BUN 11 09/11/2018    LABALBU 3.9 05/27/2015    LABALBU 4.4 03/12/2012    CREATININE 0.80 09/11/2018    CALCIUM 9.0 09/11/2018    GFRAA >60.0 09/11/2018    LABGLOM >60.0 09/11/2018    GLUCOSE 87 09/11/2018    GLUCOSE 105 03/12/2012     Magnesium:  No results found for: MG  TSH:  Lab Results   Component Value Date    TSH 2.750 11/10/2016       Patient Active Problem List   Diagnosis    Hypertension    Benign prostatic hyperplasia    Cerebrovascular disease    ED (erectile dysfunction)    Sensorineural hearing loss, bilateral    Hyperlipidemia    CAD (coronary artery disease)    Seborrheic keratoses    Chronic otitis externa    Chronic suppurative otitis media    Trigger thumb, left thumb    Hypogonadism in male    Perforation of right tympanic membrane    Osteoarthritis of both hips       There are no discontinued medications. Modified Medications    No medications on file       No orders of the defined types were placed in this encounter. Assessment:    1.  Coronary artery disease involving native coronary artery of native heart without angina pectoris    2. Essential hypertension         Plan:   Stay on same medications. See me in 6 months. This note was partially generated using Dragon voice recognition system, and there may be some incorrect words, spellings, punctuation that were not noticed in checking the note before saving.         Electronically signed by Yareli Flower MD on 6/3/2019 at 2:40 PM

## 2019-06-04 ENCOUNTER — OFFICE VISIT (OUTPATIENT)
Dept: FAMILY MEDICINE CLINIC | Age: 74
End: 2019-06-04
Payer: MEDICARE

## 2019-06-04 VITALS
TEMPERATURE: 98.1 F | WEIGHT: 222.6 LBS | SYSTOLIC BLOOD PRESSURE: 106 MMHG | HEART RATE: 73 BPM | OXYGEN SATURATION: 95 % | DIASTOLIC BLOOD PRESSURE: 70 MMHG | BODY MASS INDEX: 31.87 KG/M2 | HEIGHT: 70 IN

## 2019-06-04 DIAGNOSIS — M54.2 CERVICALGIA: Primary | ICD-10-CM

## 2019-06-04 PROCEDURE — 1036F TOBACCO NON-USER: CPT | Performed by: NURSE PRACTITIONER

## 2019-06-04 PROCEDURE — 4040F PNEUMOC VAC/ADMIN/RCVD: CPT | Performed by: NURSE PRACTITIONER

## 2019-06-04 PROCEDURE — G8598 ASA/ANTIPLAT THER USED: HCPCS | Performed by: NURSE PRACTITIONER

## 2019-06-04 PROCEDURE — 3017F COLORECTAL CA SCREEN DOC REV: CPT | Performed by: NURSE PRACTITIONER

## 2019-06-04 PROCEDURE — 99212 OFFICE O/P EST SF 10 MIN: CPT | Performed by: NURSE PRACTITIONER

## 2019-06-04 PROCEDURE — 1123F ACP DISCUSS/DSCN MKR DOCD: CPT | Performed by: NURSE PRACTITIONER

## 2019-06-04 PROCEDURE — G8427 DOCREV CUR MEDS BY ELIG CLIN: HCPCS | Performed by: NURSE PRACTITIONER

## 2019-06-04 PROCEDURE — G8417 CALC BMI ABV UP PARAM F/U: HCPCS | Performed by: NURSE PRACTITIONER

## 2019-06-04 NOTE — PROGRESS NOTES
[facility]    LIPOMA RESECTION  3/22/16    DR. BARRAGAN    OTHER SURGICAL HISTORY Left 1/2015    hydrocelectomy     Family History   Problem Relation Age of Onset    Stroke Father      Social History     Socioeconomic History    Marital status:      Spouse name: None    Number of children: 2    Years of education: None    Highest education level: None   Occupational History    None   Social Needs    Financial resource strain: None    Food insecurity:     Worry: None     Inability: None    Transportation needs:     Medical: None     Non-medical: None   Tobacco Use    Smoking status: Never Smoker    Smokeless tobacco: Never Used   Substance and Sexual Activity    Alcohol use: Yes     Comment: occasionally    Drug use: No    Sexual activity: Not Currently   Lifestyle    Physical activity:     Days per week: None     Minutes per session: None    Stress: None   Relationships    Social connections:     Talks on phone: None     Gets together: None     Attends Latter day service: None     Active member of club or organization: None     Attends meetings of clubs or organizations: None     Relationship status: None    Intimate partner violence:     Fear of current or ex partner: None     Emotionally abused: None     Physically abused: None     Forced sexual activity: None   Other Topics Concern    None   Social History Narrative    2 boys in [place]. Retired from [employer].      Current Outpatient Medications on File Prior to Visit   Medication Sig Dispense Refill    tiZANidine (ZANAFLEX) 4 MG tablet Take 1 tablet by mouth nightly as needed (muscle spasm) 21 tablet 0    finasteride (PROSCAR) 5 MG tablet Take 1 tablet by mouth daily 90 tablet 3    simvastatin (ZOCOR) 20 MG tablet TAKE 1 TABLET NIGHTLY 90 tablet 3    Naphazoline-Glycerin (CLEAR EYES COOLING COMFORT OP) Apply to eye      tamsulosin (FLOMAX) 0.4 MG capsule Take 1 capsule by mouth daily 90 capsule 3  irbesartan (AVAPRO) 150 MG tablet TAKE 1 TABLET DAILY 90 tablet 3    Ascorbic Acid (VITAMIN C) 500 MG tablet Take 500 mg by mouth daily      Multiple Vitamins-Minerals (MULTIVITAMIN PO) Take by mouth      aspirin 81 MG tablet Take 81 mg by mouth daily. No current facility-administered medications on file prior to visit. No Known Allergies    Review of Systems   Musculoskeletal: Negative for neck pain. Neurological: Negative for weakness and numbness. Objective  Vitals:    06/04/19 0753   BP: 106/70   Pulse: 73   Temp: 98.1 °F (36.7 °C)   SpO2: 95%   Weight: 222 lb 9.6 oz (101 kg)   Height: 5' 10\" (1.778 m)     Physical Exam   Constitutional: He appears well-developed and well-nourished. Psychiatric: He has a normal mood and affect. His behavior is normal. Judgment and thought content normal.   Nursing note and vitals reviewed. Assessment & Plan     Diagnosis Orders   1. Cervicalgia       Pt instructed to take zanaflex prn. Let us know if flairs back up  Discussed PT if becomes recurrent issues. Close follow up to evaluate treatment results and for coordination of care. I have reviewed the patient's medical history in detail and updated the computerized patient record. As always, patient is advised that if symptoms worsen in any way they will proceed to the nearest emergency room. ERIK ferrelln.      Kenneth Skinner, APRN - CNP

## 2019-07-22 ENCOUNTER — OFFICE VISIT (OUTPATIENT)
Dept: FAMILY MEDICINE CLINIC | Age: 74
End: 2019-07-22
Payer: MEDICARE

## 2019-07-22 VITALS
HEIGHT: 69 IN | OXYGEN SATURATION: 98 % | TEMPERATURE: 97.8 F | SYSTOLIC BLOOD PRESSURE: 126 MMHG | BODY MASS INDEX: 33.27 KG/M2 | DIASTOLIC BLOOD PRESSURE: 80 MMHG | WEIGHT: 224.6 LBS | HEART RATE: 73 BPM

## 2019-07-22 DIAGNOSIS — E78.2 MIXED HYPERLIPIDEMIA: ICD-10-CM

## 2019-07-22 DIAGNOSIS — I25.10 CORONARY ARTERY DISEASE INVOLVING NATIVE CORONARY ARTERY OF NATIVE HEART WITHOUT ANGINA PECTORIS: ICD-10-CM

## 2019-07-22 DIAGNOSIS — N40.0 BENIGN PROSTATIC HYPERPLASIA, UNSPECIFIED WHETHER LOWER URINARY TRACT SYMPTOMS PRESENT: ICD-10-CM

## 2019-07-22 DIAGNOSIS — I10 ESSENTIAL HYPERTENSION: ICD-10-CM

## 2019-07-22 DIAGNOSIS — I10 ESSENTIAL HYPERTENSION: Primary | ICD-10-CM

## 2019-07-22 LAB
ALBUMIN SERPL-MCNC: 4.1 G/DL (ref 3.5–4.6)
ALP BLD-CCNC: 65 U/L (ref 35–104)
ALT SERPL-CCNC: 26 U/L (ref 0–41)
ANION GAP SERPL CALCULATED.3IONS-SCNC: 13 MEQ/L (ref 9–15)
AST SERPL-CCNC: 25 U/L (ref 0–40)
BILIRUB SERPL-MCNC: 0.6 MG/DL (ref 0.2–0.7)
BUN BLDV-MCNC: 16 MG/DL (ref 8–23)
CALCIUM SERPL-MCNC: 9.1 MG/DL (ref 8.5–9.9)
CHLORIDE BLD-SCNC: 105 MEQ/L (ref 95–107)
CO2: 22 MEQ/L (ref 20–31)
CREAT SERPL-MCNC: 0.73 MG/DL (ref 0.7–1.2)
GFR AFRICAN AMERICAN: >60
GFR NON-AFRICAN AMERICAN: >60
GLOBULIN: 2.5 G/DL (ref 2.3–3.5)
GLUCOSE BLD-MCNC: 82 MG/DL (ref 70–99)
POTASSIUM SERPL-SCNC: 4.4 MEQ/L (ref 3.4–4.9)
SODIUM BLD-SCNC: 140 MEQ/L (ref 135–144)
TOTAL PROTEIN: 6.6 G/DL (ref 6.3–8)

## 2019-07-22 PROCEDURE — 1123F ACP DISCUSS/DSCN MKR DOCD: CPT | Performed by: NURSE PRACTITIONER

## 2019-07-22 PROCEDURE — G8427 DOCREV CUR MEDS BY ELIG CLIN: HCPCS | Performed by: NURSE PRACTITIONER

## 2019-07-22 PROCEDURE — G8598 ASA/ANTIPLAT THER USED: HCPCS | Performed by: NURSE PRACTITIONER

## 2019-07-22 PROCEDURE — 99214 OFFICE O/P EST MOD 30 MIN: CPT | Performed by: NURSE PRACTITIONER

## 2019-07-22 PROCEDURE — G8417 CALC BMI ABV UP PARAM F/U: HCPCS | Performed by: NURSE PRACTITIONER

## 2019-07-22 PROCEDURE — 3017F COLORECTAL CA SCREEN DOC REV: CPT | Performed by: NURSE PRACTITIONER

## 2019-07-22 PROCEDURE — 4040F PNEUMOC VAC/ADMIN/RCVD: CPT | Performed by: NURSE PRACTITIONER

## 2019-07-22 PROCEDURE — 1036F TOBACCO NON-USER: CPT | Performed by: NURSE PRACTITIONER

## 2019-07-22 RX ORDER — IRBESARTAN 150 MG/1
TABLET ORAL
Qty: 90 TABLET | Refills: 3 | Status: SHIPPED | OUTPATIENT
Start: 2019-07-22 | End: 2019-10-22 | Stop reason: SDUPTHER

## 2019-07-22 ASSESSMENT — ENCOUNTER SYMPTOMS
SHORTNESS OF BREATH: 0
CONSTIPATION: 0
DIARRHEA: 0
COUGH: 0

## 2019-10-22 DIAGNOSIS — I10 ESSENTIAL HYPERTENSION: ICD-10-CM

## 2019-10-22 RX ORDER — IRBESARTAN 150 MG/1
TABLET ORAL
Qty: 90 TABLET | Refills: 3 | Status: SHIPPED | OUTPATIENT
Start: 2019-10-22 | End: 2020-12-20

## 2019-12-02 ENCOUNTER — OFFICE VISIT (OUTPATIENT)
Dept: CARDIOLOGY CLINIC | Age: 74
End: 2019-12-02
Payer: MEDICARE

## 2019-12-02 VITALS
HEIGHT: 69 IN | BODY MASS INDEX: 33.98 KG/M2 | RESPIRATION RATE: 20 BRPM | OXYGEN SATURATION: 98 % | WEIGHT: 229.4 LBS | HEART RATE: 68 BPM | SYSTOLIC BLOOD PRESSURE: 124 MMHG | DIASTOLIC BLOOD PRESSURE: 80 MMHG

## 2019-12-02 DIAGNOSIS — N13.8 BPH WITH OBSTRUCTION/LOWER URINARY TRACT SYMPTOMS: Primary | ICD-10-CM

## 2019-12-02 DIAGNOSIS — N40.1 BPH WITH OBSTRUCTION/LOWER URINARY TRACT SYMPTOMS: Primary | ICD-10-CM

## 2019-12-02 DIAGNOSIS — I10 ESSENTIAL HYPERTENSION: ICD-10-CM

## 2019-12-02 DIAGNOSIS — I25.10 CORONARY ARTERY DISEASE INVOLVING NATIVE CORONARY ARTERY OF NATIVE HEART WITHOUT ANGINA PECTORIS: Primary | ICD-10-CM

## 2019-12-02 PROCEDURE — G8484 FLU IMMUNIZE NO ADMIN: HCPCS | Performed by: INTERNAL MEDICINE

## 2019-12-02 PROCEDURE — 4040F PNEUMOC VAC/ADMIN/RCVD: CPT | Performed by: INTERNAL MEDICINE

## 2019-12-02 PROCEDURE — G8427 DOCREV CUR MEDS BY ELIG CLIN: HCPCS | Performed by: INTERNAL MEDICINE

## 2019-12-02 PROCEDURE — G8598 ASA/ANTIPLAT THER USED: HCPCS | Performed by: INTERNAL MEDICINE

## 2019-12-02 PROCEDURE — 99213 OFFICE O/P EST LOW 20 MIN: CPT | Performed by: INTERNAL MEDICINE

## 2019-12-02 PROCEDURE — 1036F TOBACCO NON-USER: CPT | Performed by: INTERNAL MEDICINE

## 2019-12-02 PROCEDURE — G8417 CALC BMI ABV UP PARAM F/U: HCPCS | Performed by: INTERNAL MEDICINE

## 2019-12-02 PROCEDURE — 1123F ACP DISCUSS/DSCN MKR DOCD: CPT | Performed by: INTERNAL MEDICINE

## 2019-12-02 PROCEDURE — 3017F COLORECTAL CA SCREEN DOC REV: CPT | Performed by: INTERNAL MEDICINE

## 2019-12-02 RX ORDER — TAMSULOSIN HYDROCHLORIDE 0.4 MG/1
0.4 CAPSULE ORAL DAILY
Qty: 90 CAPSULE | Refills: 3 | Status: SHIPPED | OUTPATIENT
Start: 2019-12-02 | End: 2020-12-07 | Stop reason: SDUPTHER

## 2019-12-02 ASSESSMENT — ENCOUNTER SYMPTOMS
BLOOD IN STOOL: 0
SHORTNESS OF BREATH: 0
DIARRHEA: 0
CHEST TIGHTNESS: 0
APNEA: 0
NAUSEA: 0
VOMITING: 0

## 2019-12-09 DIAGNOSIS — N13.8 BPH WITH OBSTRUCTION/LOWER URINARY TRACT SYMPTOMS: ICD-10-CM

## 2019-12-09 DIAGNOSIS — N40.1 BPH WITH OBSTRUCTION/LOWER URINARY TRACT SYMPTOMS: ICD-10-CM

## 2019-12-09 LAB — PROSTATE SPECIFIC ANTIGEN: 1.27 NG/ML (ref 0–6.22)

## 2019-12-09 RX ORDER — SIMVASTATIN 20 MG
TABLET ORAL
Qty: 90 TABLET | Refills: 3 | Status: SHIPPED | OUTPATIENT
Start: 2019-12-09 | End: 2020-12-01

## 2019-12-11 ENCOUNTER — OFFICE VISIT (OUTPATIENT)
Dept: UROLOGY | Age: 74
End: 2019-12-11
Payer: MEDICARE

## 2019-12-11 VITALS
SYSTOLIC BLOOD PRESSURE: 120 MMHG | WEIGHT: 220 LBS | BODY MASS INDEX: 32.58 KG/M2 | DIASTOLIC BLOOD PRESSURE: 68 MMHG | HEART RATE: 80 BPM | HEIGHT: 69 IN

## 2019-12-11 DIAGNOSIS — N13.8 BPH WITH OBSTRUCTION/LOWER URINARY TRACT SYMPTOMS: Primary | ICD-10-CM

## 2019-12-11 DIAGNOSIS — N40.1 BPH WITH OBSTRUCTION/LOWER URINARY TRACT SYMPTOMS: Primary | ICD-10-CM

## 2019-12-11 PROCEDURE — 3017F COLORECTAL CA SCREEN DOC REV: CPT | Performed by: UROLOGY

## 2019-12-11 PROCEDURE — 99213 OFFICE O/P EST LOW 20 MIN: CPT | Performed by: UROLOGY

## 2019-12-11 PROCEDURE — G8427 DOCREV CUR MEDS BY ELIG CLIN: HCPCS | Performed by: UROLOGY

## 2019-12-11 PROCEDURE — G8484 FLU IMMUNIZE NO ADMIN: HCPCS | Performed by: UROLOGY

## 2019-12-11 PROCEDURE — 51798 US URINE CAPACITY MEASURE: CPT | Performed by: UROLOGY

## 2019-12-11 PROCEDURE — G8598 ASA/ANTIPLAT THER USED: HCPCS | Performed by: UROLOGY

## 2019-12-11 PROCEDURE — 4040F PNEUMOC VAC/ADMIN/RCVD: CPT | Performed by: UROLOGY

## 2019-12-11 PROCEDURE — 51741 ELECTRO-UROFLOWMETRY FIRST: CPT | Performed by: UROLOGY

## 2019-12-11 PROCEDURE — 1123F ACP DISCUSS/DSCN MKR DOCD: CPT | Performed by: UROLOGY

## 2019-12-11 PROCEDURE — 1036F TOBACCO NON-USER: CPT | Performed by: UROLOGY

## 2019-12-11 PROCEDURE — G8417 CALC BMI ABV UP PARAM F/U: HCPCS | Performed by: UROLOGY

## 2019-12-11 ASSESSMENT — ENCOUNTER SYMPTOMS
COUGH: 0
ABDOMINAL DISTENTION: 0
ABDOMINAL PAIN: 0

## 2020-01-08 ENCOUNTER — TELEPHONE (OUTPATIENT)
Dept: FAMILY MEDICINE CLINIC | Age: 75
End: 2020-01-08

## 2020-01-22 ENCOUNTER — OFFICE VISIT (OUTPATIENT)
Dept: FAMILY MEDICINE CLINIC | Age: 75
End: 2020-01-22
Payer: MEDICARE

## 2020-01-22 VITALS
TEMPERATURE: 97 F | WEIGHT: 235.6 LBS | SYSTOLIC BLOOD PRESSURE: 128 MMHG | DIASTOLIC BLOOD PRESSURE: 86 MMHG | BODY MASS INDEX: 34.9 KG/M2 | HEIGHT: 69 IN | HEART RATE: 76 BPM | OXYGEN SATURATION: 99 %

## 2020-01-22 PROCEDURE — G8484 FLU IMMUNIZE NO ADMIN: HCPCS | Performed by: NURSE PRACTITIONER

## 2020-01-22 PROCEDURE — G0438 PPPS, INITIAL VISIT: HCPCS | Performed by: NURSE PRACTITIONER

## 2020-01-22 PROCEDURE — 3017F COLORECTAL CA SCREEN DOC REV: CPT | Performed by: NURSE PRACTITIONER

## 2020-01-22 PROCEDURE — 1123F ACP DISCUSS/DSCN MKR DOCD: CPT | Performed by: NURSE PRACTITIONER

## 2020-01-22 PROCEDURE — 4040F PNEUMOC VAC/ADMIN/RCVD: CPT | Performed by: NURSE PRACTITIONER

## 2020-01-22 ASSESSMENT — LIFESTYLE VARIABLES
HOW OFTEN DURING THE LAST YEAR HAVE YOU HAD A FEELING OF GUILT OR REMORSE AFTER DRINKING: 0
AUDIT TOTAL SCORE: 1
HOW OFTEN DURING THE LAST YEAR HAVE YOU FOUND THAT YOU WERE NOT ABLE TO STOP DRINKING ONCE YOU HAD STARTED: 0
AUDIT-C TOTAL SCORE: 1
HOW OFTEN DURING THE LAST YEAR HAVE YOU NEEDED AN ALCOHOLIC DRINK FIRST THING IN THE MORNING TO GET YOURSELF GOING AFTER A NIGHT OF HEAVY DRINKING: 0
HOW OFTEN DO YOU HAVE A DRINK CONTAINING ALCOHOL: 1
HOW MANY STANDARD DRINKS CONTAINING ALCOHOL DO YOU HAVE ON A TYPICAL DAY: 0
HOW OFTEN DURING THE LAST YEAR HAVE YOU BEEN UNABLE TO REMEMBER WHAT HAPPENED THE NIGHT BEFORE BECAUSE YOU HAD BEEN DRINKING: 0
HOW OFTEN DURING THE LAST YEAR HAVE YOU FAILED TO DO WHAT WAS NORMALLY EXPECTED FROM YOU BECAUSE OF DRINKING: 0
HAVE YOU OR SOMEONE ELSE BEEN INJURED AS A RESULT OF YOUR DRINKING: 0
HOW OFTEN DO YOU HAVE SIX OR MORE DRINKS ON ONE OCCASION: 0
HAS A RELATIVE, FRIEND, DOCTOR, OR ANOTHER HEALTH PROFESSIONAL EXPRESSED CONCERN ABOUT YOUR DRINKING OR SUGGESTED YOU CUT DOWN: 0

## 2020-01-22 ASSESSMENT — PATIENT HEALTH QUESTIONNAIRE - PHQ9
SUM OF ALL RESPONSES TO PHQ QUESTIONS 1-9: 0
SUM OF ALL RESPONSES TO PHQ QUESTIONS 1-9: 0

## 2020-01-22 NOTE — PROGRESS NOTES
both hips     Paresthesias     Perforation of right tympanic membrane     chronic; Dr. Reina Morin PSA (prostate specific antigen) 2007- 1.12    Seborrheic keratosis 10/10/2013    Sensorineural hearing loss, bilateral     Stable angina (HCC)     Trigger thumb, left thumb        Past Surgical History:   Procedure Laterality Date    CARPAL TUNNEL RELEASE Right 05/30/2017    [facility]    CARPAL TUNNEL RELEASE Left 06/2017    [facility]    COLONOSCOPY  6/2015    Dr. Joy Dooley      lower eyelids   [address] [place] [address]    LIPOMA RESECTION  3/22/16    DR. BARRAGAN    OTHER SURGICAL HISTORY Left 1/2015    hydrocelectomy    TYMPANOPLASTY      At the [place]         Family History   Problem Relation Age of Onset    Stroke Father        CareTeam (Including outside providers/suppliers regularly involved in providing care):   Patient Care Team:  GIL Aaron CNP as PCP - General (Family Nurse Practitioner)  GIL Aaron CNP as PCP - [facility] Empaneled Provider  Zoë Mahoney MD (Urology)  Vel Hunt MD (Family Medicine)  Keri Ramirez MD (General Surgery)    Wt Readings from Last 3 Encounters:   01/22/20 235 lb 9.6 oz (106.9 kg)   12/11/19 220 lb (99.8 kg)   12/02/19 229 lb 6.4 oz (104.1 kg)     Vitals:    01/22/20 0754   BP: 128/86   Pulse: 76   Temp: 97 °F (36.1 °C)   SpO2: 99%   Weight: 235 lb 9.6 oz (106.9 kg)   Height: 5' 8.5\" (1.74 m)     Body mass index is 35.3 kg/m². Based upon direct observation of the patient, evaluation of cognition reveals recent and remote memory intact.     General Appearance: alert and oriented to person, place and time, well developed and well- nourished, in no acute distress  Skin: warm and dry, no rash or erythema  Head: normocephalic and atraumatic  Eyes: pupils equal, round, and reactive to light, extraocular eye movements intact, conjunctivae normal  ENT: tympanic membrane, external ear and ear canal normal bilaterally, nose without deformity, nasal mucosa and turbinates normal without polyps  Neck: supple and non-tender without mass, no thyromegaly or thyroid nodules, no cervical lymphadenopathy  Pulmonary/Chest: clear to auscultation bilaterally- no wheezes, rales or rhonchi, normal air movement, no respiratory distress  Cardiovascular: normal rate, regular rhythm, normal S1 and S2, no murmurs, rubs, clicks, or gallops, distal pulses intact, no carotid bruits  Abdomen: soft, non-tender, non-distended, normal bowel sounds, no masses or organomegaly  Extremities: no cyanosis, clubbing or edema  Musculoskeletal: normal range of motion, no joint swelling, deformity or tenderness  Neurologic: reflexes normal and symmetric, no cranial nerve deficit, gait, coordination and speech normal    Patient's complete Health Risk Assessment and screening values have been reviewed and are found in Flowsheets. The following problems were reviewed today and where indicated follow up appointments were made and/or referrals ordered. Positive Risk Factor Screenings with Interventions:     General Health:  General  In general, how would you say your health is?: Good  In the past 7 days, have you experienced any of the following? New or Increased Pain, New or Increased Fatigue, Loneliness, Social Isolation, Stress or Anger?: None of These  Do you get the social and emotional support that you need?: Yes  Do you have a Living Will?: (!) No  General Health Risk Interventions:  · Patient has one    Health Habits/Nutrition:  Health Habits/Nutrition  Do you exercise for at least 20 minutes 2-3 times per week?: Yes  Have you lost any weight without trying in the past 3 months?: No  Do you eat fewer than 2 meals per day?: No  Have you seen a dentist within the past year?: Yes  Body mass index is 35.3 kg/m².   Health Habits/Nutrition Interventions:  · going to be working out more often in basement    Hearing/Vision:  No exam data present  Hearing/Vision  Do you or your family notice any trouble with your hearing?: (!) Yes  Do you have difficulty driving, watching TV, or doing any of your daily activities because of your eyesight?: No  Have you had an eye exam within the past year?: Yes  Hearing/Vision Interventions:  · Hearing concerns:  patient declines any further evaluation/treatment for hearing issues    Personalized Preventive Plan   Current Health Maintenance Status  Immunization History   Administered Date(s) Administered    Influenza A ([id number]) Vaccine PF IM 11/21/2009    Influenza Vaccine, unspecified formulation 11/08/2007, 09/28/2011, 11/10/2016    Influenza Virus Vaccine 09/27/2012, 10/08/2013, 09/28/2014    Influenza Whole 09/28/2014    Influenza, High Dose (Fluzone 65 yrs and older) 11/10/2016, 09/19/2017, 09/11/2018    Influenza, Quadv, IM, PF (6 mo and older Fluzone, Flulaval, Fluarix, and 3 yrs and older Afluria) 10/08/2013    Pneumococcal Conjugate 13-valent ([id number]) 12/03/2015    Pneumococcal Polysaccharide ([id number]) 03/14/2011    Td (Adult), 2 Lf Tetanus Toxoid, Pf (Td, Absorbed) 09/26/2017    Td, unspecified formulation 03/14/2011    Tdap (Boostrix, Adacel) 03/12/2012    Zoster Recombinant (Shingrix) 06/20/2019        Health Maintenance   Topic Date Due    Annual Wellness Visit (AWV)  05/29/2019    Shingles Vaccine (2 of 2) 08/15/2019    Colon cancer screen colonoscopy  08/27/2019    Flu vaccine (1) 09/01/2019    Lipid screen  09/11/2019    Potassium monitoring  07/22/2020    Creatinine monitoring  07/22/2020    DTaP/Tdap/Td vaccine (3 - Td) 09/26/2027    Pneumococcal 65+ years Vaccine  Completed    Hepatitis C screen  Completed     Recommendations for Preventive Services Due: see orders and patient instructions/AVS.  .   Recommended screening schedule for the next 5-10 years is provided to the patient in written form: see Patient Instructions/AVS.    Fay Victoria was seen today for medicare awv and health maintenance. Diagnoses and all orders for this visit:    Routine general medical examination at a health care facility    Colon cancer screening  -     Cologuard; Future    Essential hypertension  -     Comprehensive Metabolic Panel; Future    Mixed hyperlipidemia  -     Lipid Panel;  Future

## 2020-01-22 NOTE — PATIENT INSTRUCTIONS
Personalized Preventive Plan for Magui Juarez - 1/22/2020  Medicare offers a range of preventive health benefits. Some of the tests and screenings are paid in full while other may be subject to a deductible, co-insurance, and/or copay. Some of these benefits include a comprehensive review of your medical history including lifestyle, illnesses that may run in your family, and various assessments and screenings as appropriate. After reviewing your medical record and screening and assessments performed today your provider may have ordered immunizations, labs, imaging, and/or referrals for you. A list of these orders (if applicable) as well as your Preventive Care list are included within your After Visit Summary for your review. Other Preventive Recommendations:    · A preventive eye exam performed by an eye specialist is recommended every 1-2 years to screen for glaucoma; cataracts, macular degeneration, and other eye disorders. · A preventive dental visit is recommended every 6 months. · Try to get at least 150 minutes of exercise per week or 10,000 steps per day on a pedometer . · Order or download the FREE \"Exercise & Physical Activity: Your Everyday Guide\" from [employer]. Call 0-547.580.5710 or search [employer] online. · You need 3104-4961 mg of calcium and 8327-3817 IU of vitamin D per day. It is possible to meet your calcium requirement with diet alone, but a vitamin D supplement is usually necessary to meet this goal.  · When exposed to the sun, use a sunscreen that protects against both UVA and UVB radiation with an SPF of 30 or greater. Reapply every 2 to 3 hours or after sweating, drying off with a towel, or swimming. · Always wear a seat belt when traveling in a car. Always wear a helmet when riding a bicycle or motorcycle.

## 2020-01-31 DIAGNOSIS — E78.2 MIXED HYPERLIPIDEMIA: ICD-10-CM

## 2020-01-31 DIAGNOSIS — I10 ESSENTIAL HYPERTENSION: ICD-10-CM

## 2020-01-31 LAB
ALBUMIN SERPL-MCNC: 4.2 G/DL (ref 3.5–4.6)
ALP BLD-CCNC: 74 U/L (ref 35–104)
ALT SERPL-CCNC: 29 U/L (ref 0–41)
ANION GAP SERPL CALCULATED.3IONS-SCNC: 13 MEQ/L (ref 9–15)
AST SERPL-CCNC: 25 U/L (ref 0–40)
BILIRUB SERPL-MCNC: 0.5 MG/DL (ref 0.2–0.7)
BUN BLDV-MCNC: 17 MG/DL (ref 8–23)
CALCIUM SERPL-MCNC: 9.3 MG/DL (ref 8.5–9.9)
CHLORIDE BLD-SCNC: 100 MEQ/L (ref 95–107)
CHOLESTEROL, TOTAL: 170 MG/DL (ref 0–199)
CO2: 26 MEQ/L (ref 20–31)
CREAT SERPL-MCNC: 0.88 MG/DL (ref 0.7–1.2)
GFR AFRICAN AMERICAN: >60
GFR NON-AFRICAN AMERICAN: >60
GLOBULIN: 2.7 G/DL (ref 2.3–3.5)
GLUCOSE BLD-MCNC: 69 MG/DL (ref 70–99)
HDLC SERPL-MCNC: 46 MG/DL (ref 40–59)
LDL CHOLESTEROL CALCULATED: 85 MG/DL (ref 0–129)
POTASSIUM SERPL-SCNC: 4.4 MEQ/L (ref 3.4–4.9)
SODIUM BLD-SCNC: 139 MEQ/L (ref 135–144)
TOTAL PROTEIN: 6.9 G/DL (ref 6.3–8)
TRIGL SERPL-MCNC: 193 MG/DL (ref 0–150)

## 2020-05-07 RX ORDER — FINASTERIDE 5 MG/1
TABLET, FILM COATED ORAL
Qty: 90 TABLET | Refills: 3 | Status: SHIPPED | OUTPATIENT
Start: 2020-05-07 | End: 2021-04-14

## 2020-06-17 ENCOUNTER — TELEPHONE (OUTPATIENT)
Dept: ADMINISTRATIVE | Age: 75
End: 2020-06-17

## 2020-07-28 ENCOUNTER — OFFICE VISIT (OUTPATIENT)
Dept: FAMILY MEDICINE CLINIC | Age: 75
End: 2020-07-28
Payer: MEDICARE

## 2020-07-28 VITALS
HEART RATE: 77 BPM | OXYGEN SATURATION: 97 % | WEIGHT: 235.5 LBS | SYSTOLIC BLOOD PRESSURE: 138 MMHG | BODY MASS INDEX: 34.88 KG/M2 | TEMPERATURE: 97.6 F | HEIGHT: 69 IN | DIASTOLIC BLOOD PRESSURE: 88 MMHG

## 2020-07-28 PROBLEM — E66.01 MORBIDLY OBESE (HCC): Status: ACTIVE | Noted: 2020-07-28

## 2020-07-28 PROCEDURE — 3017F COLORECTAL CA SCREEN DOC REV: CPT | Performed by: NURSE PRACTITIONER

## 2020-07-28 PROCEDURE — 1036F TOBACCO NON-USER: CPT | Performed by: NURSE PRACTITIONER

## 2020-07-28 PROCEDURE — G8427 DOCREV CUR MEDS BY ELIG CLIN: HCPCS | Performed by: NURSE PRACTITIONER

## 2020-07-28 PROCEDURE — 1123F ACP DISCUSS/DSCN MKR DOCD: CPT | Performed by: NURSE PRACTITIONER

## 2020-07-28 PROCEDURE — 99214 OFFICE O/P EST MOD 30 MIN: CPT | Performed by: NURSE PRACTITIONER

## 2020-07-28 PROCEDURE — G8417 CALC BMI ABV UP PARAM F/U: HCPCS | Performed by: NURSE PRACTITIONER

## 2020-07-28 PROCEDURE — 4040F PNEUMOC VAC/ADMIN/RCVD: CPT | Performed by: NURSE PRACTITIONER

## 2020-07-28 ASSESSMENT — ENCOUNTER SYMPTOMS
COUGH: 0
SHORTNESS OF BREATH: 0

## 2020-07-28 NOTE — PROGRESS NOTES
Subjective  Chief Complaint   Patient presents with    6 Month Follow-Up    Hypertension    Hyperlipidemia       Hypertension   This is a chronic problem. The current episode started more than 1 year ago. The problem is unchanged. The problem is controlled. Pertinent negatives include no chest pain or shortness of breath. (None) There are no associated agents to hypertension. Risk factors for coronary artery disease include obesity, male gender and dyslipidemia. Past treatments include alpha 1 blockers. The current treatment provides moderate improvement. Compliance problems include diet and exercise. Hyperlipidemia   This is a chronic problem. The current episode started more than 1 year ago. The problem is controlled. Recent lipid tests were reviewed and are normal. There are no known factors aggravating his hyperlipidemia. Pertinent negatives include no chest pain or shortness of breath. Current antihyperlipidemic treatment includes statins. The current treatment provides no improvement of lipids. There are no compliance problems. There are no known risk factors for coronary artery disease. Overall pt has been feeling well with no concerns.     Patient Active Problem List    Diagnosis Date Noted    Morbidly obese ([address] 07/28/2020    Osteoarthritis of both hips     Perforation of right tympanic membrane     Trigger thumb, left thumb     Hypogonadism in male     Chronic otitis externa 02/11/2016    CAD (coronary artery disease)     Other forms of angina pectoris ([address]     Seborrheic keratoses     Sensorineural hearing loss, bilateral     Chronic suppurative otitis media 11/11/2015    ED (erectile dysfunction) 11/28/2014    Hyperlipidemia 10/10/2013    Hypertension     Benign prostatic hyperplasia     Cerebrovascular disease      Past Medical History:   Diagnosis Date    BPH (benign prostatic hypertrophy)     CAD (coronary artery disease)     Cerebrovascular disease     Chronic otitis externa 2/11/2016    Chronic suppurative otitis media 11/11/2015    Ear injury 2006    [facility]    ED (erectile dysfunction) 11/28/2014    Hearing loss     History of nephrolithiasis     History of pneumonia 2004    History of prediabetes     Hyperlipidemia 10/10/2013    Hypertension     Hypogonadism in male     Mass of chest wall, left     Optic neuropathy, ischemic     Osteoarthritis of both hips     Paresthesias     Perforation of right tympanic membrane     chronic; Dr. Mariela Han PSA (prostate specific antigen) 2007- 1.12    Seborrheic keratosis 10/10/2013    Sensorineural hearing loss, bilateral     Stable angina (HCC)     Trigger thumb, left thumb      Past Surgical History:   Procedure Laterality Date    CARPAL TUNNEL RELEASE Right 05/30/2017    [facility] MD    CARPAL TUNNEL RELEASE Left 06/2017    [facility]    COLONOSCOPY  6/2015    Dr. Tristan Scrape      lower eyelids   [address] [place] [address]    LIPOMA RESECTION  3/22/16    DR. BARRAGAN    OTHER SURGICAL HISTORY Left 1/2015    hydrocelectomy    TYMPANOPLASTY      At [facility]     Family History   Problem Relation Age of Onset    Stroke Father      Social History     Socioeconomic History    Marital status:       Spouse name: None    Number of children: 2    Years of education: None    Highest education level: None   Occupational History    None   Social Needs    Financial resource strain: None    Food insecurity     Worry: None     Inability: None    Transportation needs     Medical: None     Non-medical: None   Tobacco Use    Smoking status: Never Smoker    Smokeless tobacco: Never Used   Substance and Sexual Activity    Alcohol use: Yes     Comment: occasionally    Drug use: No    Sexual activity: Not Currently   Lifestyle    Physical activity     Days per week: None     Minutes per session: None    Stress: None   Relationships    Social connections     Talks on phone: None     Gets together: None     Attends [employer]ism service: None     Active member of club or organization: None     Attends meetings of clubs or organizations: None     Relationship status: None    Intimate partner violence     Fear of current or ex partner: None     Emotionally abused: None     Physically abused: None     Forced sexual activity: None   Other Topics Concern    None   Social History Narrative    2 boys in [place]. Retired from [employer]. Current Outpatient Medications on File Prior to Visit   Medication Sig Dispense Refill    finasteride (PROSCAR) 5 MG tablet TAKE 1 TABLET DAILY 90 tablet 3    simvastatin (ZOCOR) 20 MG tablet TAKE 1 TABLET NIGHTLY 90 tablet 3    tamsulosin (FLOMAX) 0.4 MG capsule Take 1 capsule by mouth daily 90 capsule 3    irbesartan (AVAPRO) 150 MG tablet TAKE 1 TABLET DAILY 90 tablet 3    Naphazoline-Glycerin (CLEAR EYES COOLING COMFORT OP) Apply to eye      Ascorbic Acid (VITAMIN C) 500 MG tablet Take 500 mg by mouth daily      Multiple Vitamins-Minerals (MULTIVITAMIN PO) Take by mouth      aspirin 81 MG tablet Take 81 mg by mouth daily. No current facility-administered medications on file prior to visit. No Known Allergies    Review of Systems   Constitutional: Negative for fatigue. Respiratory: Negative for cough and shortness of breath. Cardiovascular: Negative for chest pain. Objective  Vitals:    07/28/20 0803   BP: 138/88   Pulse: 77   Temp: 97.6 °F (36.4 °C)   SpO2: 97%   Weight: 235 lb 8 oz (106.8 kg)   Height: 5' 8.5\" (1.74 m)     Physical Exam  Vitals signs and nursing note reviewed. Constitutional:       Appearance: Normal appearance. He is normal weight. HENT:      Head: Normocephalic.       Right Ear: Tympanic membrane normal.      Left Ear: Tympanic membrane normal.      Nose: Nose normal.      Mouth/Throat:      Mouth: Mucous membranes are moist.   Eyes:      Extraocular Movements: Extraocular movements intact. Conjunctiva/sclera: Conjunctivae normal.      Pupils: Pupils are equal, round, and reactive to light. Cardiovascular:      Rate and Rhythm: Normal rate and regular rhythm. Pulses: Normal pulses. Heart sounds: Normal heart sounds. Skin:     General: Skin is warm. Neurological:      General: No focal deficit present. Mental Status: He is alert and oriented to person, place, and time. Mental status is at baseline. Psychiatric:         Mood and Affect: Mood normal.         Behavior: Behavior normal.         Thought Content: Thought content normal.         Judgment: Judgment normal.       Assessment & Plan     Diagnosis Orders   1. Essential hypertension  Continue current medications   2. Other forms of angina pectoris ([address]  stable   3. Morbidly obese ([address]  Recommended to work on diet and exercise   4. Mixed hyperlipidemia  Continue statin and diet       Side effects, adverse effects of the medication prescribed today, as well as treatment plan/ rationale and result expectations have been discussed with the patient who expresses understanding and desires to proceed. Close follow up to evaluate treatment results and for coordination of care. I have reviewed the patient's medical history in detail and updated the computerized patient record. As always, patient is advised that if symptoms worsen in any way they will proceed to the nearest emergency room. Return in about 6 months (around 1/28/2021) for medicare annual wellness.     Romana Morales, APRN - CNP

## 2020-10-01 ENCOUNTER — NURSE ONLY (OUTPATIENT)
Dept: FAMILY MEDICINE CLINIC | Age: 75
End: 2020-10-01
Payer: MEDICARE

## 2020-10-01 PROCEDURE — 90694 VACC AIIV4 NO PRSRV 0.5ML IM: CPT | Performed by: NURSE PRACTITIONER

## 2020-10-01 PROCEDURE — G0008 ADMIN INFLUENZA VIRUS VAC: HCPCS | Performed by: NURSE PRACTITIONER

## 2020-10-01 NOTE — PROGRESS NOTES
After obtaining consent, and per orders of Pelon Capps, injection of influenza given in Left deltoid by Pam Muñiz. Patient instructed to remain in clinic for 20 minutes afterwards, and to report any adverse reaction to me immediately. Vaccine Information Sheet, \"Influenza - Inactivated\"  given to Wesley Duval, or parent/legal guardian of  Wesley Duval and verbalized understanding. Patient responses:    Have you ever had a reaction to a flu vaccine? No  Are you able to eat eggs without adverse effects? Yes  Do you have any current illness? No  Have you ever had [place] Syndrome? No    Flu vaccine given per order. Please see immunization tab.

## 2020-11-10 ENCOUNTER — HOSPITAL ENCOUNTER (EMERGENCY)
Age: 75
Discharge: HOME | End: 2020-11-10
Payer: MEDICARE

## 2020-11-10 ENCOUNTER — APPOINTMENT (OUTPATIENT)
Dept: GENERAL RADIOLOGY | Age: 75
End: 2020-11-10
Payer: MEDICARE

## 2020-11-10 VITALS
HEIGHT: 68 IN | BODY MASS INDEX: 34.56 KG/M2 | DIASTOLIC BLOOD PRESSURE: 83 MMHG | TEMPERATURE: 98.1 F | SYSTOLIC BLOOD PRESSURE: 130 MMHG | HEART RATE: 72 BPM | RESPIRATION RATE: 20 BRPM | OXYGEN SATURATION: 93 % | WEIGHT: 228 LBS

## 2020-11-10 LAB
ALBUMIN SERPL-MCNC: 3.8 G/DL (ref 3.5–4.6)
ALP BLD-CCNC: 71 U/L (ref 35–104)
ALT SERPL-CCNC: 21 U/L (ref 0–41)
ANION GAP SERPL CALCULATED.3IONS-SCNC: 8 MEQ/L (ref 9–15)
AST SERPL-CCNC: 20 U/L (ref 0–40)
BASOPHILS ABSOLUTE: 0 K/UL (ref 0–0.2)
BASOPHILS RELATIVE PERCENT: 0.3 %
BILIRUB SERPL-MCNC: 0.5 MG/DL (ref 0.2–0.7)
BUN BLDV-MCNC: 12 MG/DL (ref 8–23)
CALCIUM SERPL-MCNC: 8.8 MG/DL (ref 8.5–9.9)
CHLORIDE BLD-SCNC: 102 MEQ/L (ref 95–107)
CK MB: 4.1 NG/ML (ref 0–6.7)
CO2: 25 MEQ/L (ref 20–31)
CREAT SERPL-MCNC: 0.82 MG/DL (ref 0.7–1.2)
CREATINE KINASE-MB INDEX: 1.9 % (ref 0–3.5)
EKG ATRIAL RATE: 83 BPM
EKG P AXIS: 59 DEGREES
EKG P-R INTERVAL: 140 MS
EKG Q-T INTERVAL: 388 MS
EKG QRS DURATION: 88 MS
EKG QTC CALCULATION (BAZETT): 455 MS
EKG R AXIS: -3 DEGREES
EKG T AXIS: 58 DEGREES
EKG VENTRICULAR RATE: 83 BPM
EOSINOPHILS ABSOLUTE: 0 K/UL (ref 0–0.7)
EOSINOPHILS RELATIVE PERCENT: 0.3 %
GFR AFRICAN AMERICAN: >60
GFR NON-AFRICAN AMERICAN: >60
GLOBULIN: 2.3 G/DL (ref 2.3–3.5)
GLUCOSE BLD-MCNC: 124 MG/DL (ref 70–99)
HCT VFR BLD CALC: 46.9 % (ref 42–52)
HEMOGLOBIN: 16 G/DL (ref 14–18)
LYMPHOCYTES ABSOLUTE: 1.3 K/UL (ref 1–4.8)
LYMPHOCYTES RELATIVE PERCENT: 15 %
MCH RBC QN AUTO: 30.9 PG (ref 27–31.3)
MCHC RBC AUTO-ENTMCNC: 34 % (ref 33–37)
MCV RBC AUTO: 90.9 FL (ref 80–100)
MONOCYTES ABSOLUTE: 0.5 K/UL (ref 0.2–0.8)
MONOCYTES RELATIVE PERCENT: 6 %
NEUTROPHILS ABSOLUTE: 6.6 K/UL (ref 1.4–6.5)
NEUTROPHILS RELATIVE PERCENT: 78.4 %
PDW BLD-RTO: 12.6 % (ref 11.5–14.5)
PLATELET # BLD: 213 K/UL (ref 130–400)
POTASSIUM SERPL-SCNC: 4 MEQ/L (ref 3.4–4.9)
RBC # BLD: 5.16 M/UL (ref 4.7–6.1)
SODIUM BLD-SCNC: 135 MEQ/L (ref 135–144)
TOTAL CK: 213 U/L (ref 0–190)
TOTAL PROTEIN: 6.1 G/DL (ref 6.3–8)
TROPONIN: <0.01 NG/ML (ref 0–0.01)
WBC # BLD: 8.5 K/UL (ref 4.8–10.8)

## 2020-11-10 PROCEDURE — 36415 COLL VENOUS BLD VENIPUNCTURE: CPT

## 2020-11-10 PROCEDURE — 99282 EMERGENCY DEPT VISIT SF MDM: CPT

## 2020-11-10 PROCEDURE — 82553 CREATINE MB FRACTION: CPT

## 2020-11-10 PROCEDURE — 82550 ASSAY OF CK (CPK): CPT

## 2020-11-10 PROCEDURE — 85025 COMPLETE CBC W/AUTO DIFF WBC: CPT

## 2020-11-10 PROCEDURE — 71045 X-RAY EXAM CHEST 1 VIEW: CPT

## 2020-11-10 PROCEDURE — 84484 ASSAY OF TROPONIN QUANT: CPT

## 2020-11-10 PROCEDURE — 93005 ELECTROCARDIOGRAM TRACING: CPT | Performed by: EMERGENCY MEDICINE

## 2020-11-10 PROCEDURE — 80053 COMPREHEN METABOLIC PANEL: CPT

## 2020-11-10 ASSESSMENT — ENCOUNTER SYMPTOMS
RHINORRHEA: 0
VOMITING: 0
CONSTIPATION: 0
ABDOMINAL PAIN: 0
DIARRHEA: 0
SORE THROAT: 0
EYE DISCHARGE: 0
CHOKING: 0
NAUSEA: 0
SHORTNESS OF BREATH: 0
COLOR CHANGE: 0
COUGH: 0
ABDOMINAL DISTENTION: 0

## 2020-11-10 NOTE — ED PROVIDER NOTES
numbness and headaches. Psychiatric/Behavioral: Negative for agitation and confusion. Except as noted above the remainder of the review of systems was reviewed and negative. PAST MEDICAL HISTORY     Past Medical History:   Diagnosis Date    BPH (benign prostatic hypertrophy)     CAD (coronary artery disease)     Cerebrovascular disease     Chronic otitis externa 2/11/2016    Chronic suppurative otitis media 11/11/2015    Ear injury 2006    [facility]    ED (erectile dysfunction) 11/28/2014    Hearing loss     History of nephrolithiasis     History of pneumonia 2004    History of prediabetes     Hyperlipidemia 10/10/2013    Hypertension     Hypogonadism in male     Mass of chest wall, left     Optic neuropathy, ischemic     Osteoarthritis of both hips     Paresthesias     Perforation of right tympanic membrane     chronic; Dr. Olya Perry PSA (prostate specific antigen) 2007- 1.12    Seborrheic keratosis 10/10/2013    Sensorineural hearing loss, bilateral     Stable angina (HCC)     Trigger thumb, left thumb          SURGICALHISTORY       Past Surgical History:   Procedure Laterality Date    CARPAL TUNNEL RELEASE Right 05/30/2017    CCRAVEN PEARL MD    CARPAL TUNNEL RELEASE Left 06/2017    CCRAVEN PEARL MD    COLONOSCOPY  6/2015    Dr. Dawson Burgos      lower eyelids   [address] [place] [address]    LIPOMA RESECTION  3/22/16    DR. BARRAGAN    OTHER SURGICAL HISTORY Left 1/2015    hydrocelectomy    TYMPANOPLASTY      At the [address]       Previous Medications    ASCORBIC ACID (VITAMIN C) 500 MG TABLET    Take 500 mg by mouth daily    ASPIRIN 81 MG TABLET    Take 81 mg by mouth daily.     FINASTERIDE (PROSCAR) 5 MG TABLET    TAKE 1 TABLET DAILY    IRBESARTAN (AVAPRO) 150 MG TABLET    TAKE 1 TABLET DAILY    MULTIPLE VITAMINS-MINERALS (MULTIVITAMIN PO)    Take by mouth    NAPHAZOLINE-GLYCERIN (CLEAR EYES COOLING COMFORT OP)    Apply to eye    SIMVASTATIN (ZOCOR) 20 MG TABLET    TAKE 1 TABLET NIGHTLY    TAMSULOSIN (FLOMAX) 0.4 MG CAPSULE    Take 1 capsule by mouth daily       ALLERGIES     Patient has no known allergies. FAMILY HISTORY       Family History   Problem Relation Age of Onset    Stroke Father           SOCIAL HISTORY       Social History     Socioeconomic History    Marital status:      Spouse name: Not on file    Number of children: 2    Years of education: Not on file    Highest education level: Not on file   Occupational History    Not on file   Social Needs    Financial resource strain: Not on file    Food insecurity     Worry: Not on file     Inability: Not on file    Transportation needs     Medical: Not on file     Non-medical: Not on file   Tobacco Use    Smoking status: Never Smoker    Smokeless tobacco: Never Used   Substance and Sexual Activity    Alcohol use: Yes     Comment: occasionally    Drug use: No    Sexual activity: Not Currently   Lifestyle    Physical activity     Days per week: Not on file     Minutes per session: Not on file    Stress: Not on file   Relationships    Social connections     Talks on phone: Not on file     Gets together: Not on file     Attends Religion service: Not on file     Active member of club or organization: Not on file     Attends meetings of clubs or organizations: Not on file     Relationship status: Not on file    Intimate partner violence     Fear of current or ex partner: Not on file     Emotionally abused: Not on file     Physically abused: Not on file     Forced sexual activity: Not on file   Other Topics Concern    Not on file   Social History Narrative    2 boys in [place]. Retired from [employer].        SCREENINGS      @FLOW([id number])@      PHYSICAL EXAM    (up to 7 for level 4, 8 or more for level 5)     ED Triage Vitals [11/10/20 1149]   BP Temp Temp Source Pulse Resp SpO2 Height Weight   133/84 98.1 °F (36.7 °C) Oral 88 18 93 % 5' 8\" (1.727 m) 228 lb (103.4 kg)       Physical Exam  Vitals signs and nursing note reviewed. Constitutional:       General: He is not in acute distress. Appearance: He is well-developed. He is not ill-appearing, toxic-appearing or diaphoretic. HENT:      Head: Normocephalic. Right Ear: Tympanic membrane normal.      Left Ear: Tympanic membrane normal.      Nose: No congestion. Mouth/Throat:      Mouth: Mucous membranes are moist.      Pharynx: No oropharyngeal exudate or posterior oropharyngeal erythema. Eyes:      Extraocular Movements: Extraocular movements intact. Conjunctiva/sclera: Conjunctivae normal.      Pupils: Pupils are equal, round, and reactive to light. Neck:      Musculoskeletal: Normal range of motion and neck supple. No neck rigidity. Vascular: No JVD. Trachea: No tracheal deviation. Cardiovascular:      Rate and Rhythm: Normal rate. Pulses: Normal pulses. Heart sounds: Normal heart sounds. No murmur. No friction rub. No gallop. Pulmonary:      Effort: Pulmonary effort is normal. No tachypnea, accessory muscle usage, respiratory distress or retractions. Breath sounds: No stridor. No wheezing, rhonchi or rales. Comments: Lung sounds are clear in all fields, there is no wheezes rales or rhonchi, accessory muscle use, no pain on palpation of chest wall, no increased pain with deep inspiration or movement. Chest:      Chest wall: No tenderness. Abdominal:      General: Abdomen is flat. Bowel sounds are normal. There is no distension or abdominal bruit. Palpations: There is no shifting dullness, fluid wave, hepatomegaly, splenomegaly, mass or pulsatile mass. Tenderness: There is no abdominal tenderness. There is no right CVA tenderness, left CVA tenderness, guarding or rebound. Negative signs include Viera's sign, Rovsing's sign and McBurney's sign. Musculoskeletal: Normal range of motion.          General: No deformity. Skin:     General: Skin is warm and dry. Capillary Refill: Capillary refill takes less than 2 seconds. Coloration: Skin is not jaundiced. Neurological:      General: No focal deficit present. Mental Status: He is alert and oriented to person, place, and time. Mental status is at baseline. Cranial Nerves: No cranial nerve deficit. Sensory: No sensory deficit. Motor: No weakness. Coordination: Coordination normal.   Psychiatric:         Mood and Affect: Mood normal.         DIAGNOSTIC RESULTS     EKG: All EKG's are interpreted by the Emergency Department Physician who either signs or Co-signsthis chart in the absence of a cardiologist.    EKG shows normal sinus rhythm at 82 bpm there is no acute ST segment abnormality no ventricular ectopy QTC is 455 ms    RADIOLOGY:   Non-plain filmimages such as CT, Ultrasound and MRI are read by the radiologist. Plain radiographic images are visualized and preliminarily interpreted by the emergency physician with the below findings:    CXR shows no acute pulmonary process      Interpretation per the Radiologist below, if available at the time ofthis note:    XR CHEST PORTABLE    (Results Pending)         ED BEDSIDE ULTRASOUND:   Performed by ED Physician - none    LABS:  Labs Reviewed   COMPREHENSIVE METABOLIC PANEL - Abnormal; Notable for the following components:       Result Value    Anion Gap 8 (*)     Glucose 124 (*)     Total Protein 6.1 (*)     All other components within normal limits   CBC WITH AUTO DIFFERENTIAL - Abnormal; Notable for the following components:    Neutrophils Absolute 6.6 (*)     All other components within normal limits   CK - Abnormal; Notable for the following components: Total  (*)     All other components within normal limits   TROPONIN   CKMB & RELATIVE PERCENT       All other labs were within normal range or not returned as of this dictation.     EMERGENCY DEPARTMENT COURSE and DIFFERENTIAL DIAGNOSIS/MDM:   Vitals:    Vitals:    11/10/20 1149 11/10/20 1200   BP: 133/84 127/88   Pulse: 88 81   Resp: 18 20   Temp: 98.1 °F (36.7 °C)    TempSrc: Oral    SpO2: 93% 95%   Weight: 228 lb (103.4 kg)    Height: 5' 8\" (1.727 m)           MDM  Number of Diagnoses or Management Options  Chest pain, unspecified type:   Diagnosis management comments: Pt presented with right sided chest pain at began at 9am today and was very brief in duration 10-15 seconds. He states 2 episodes back to back, then has not had any recurrent episodes. Cardiac evaluation shows no acute findings and chest xray is negative. Pt was discharges as nonspecific chest pain and advised to follow up with his primary doctor and cardiologist in the next 2 days. CRITICAL CARE TIME   Total Critical Care time was 0 minutes, excluding separately reportableprocedures. There was a high probability of clinicallysignificant/life threatening deterioration in the patient's condition which required my urgent intervention. CONSULTS:  None    PROCEDURES:  Unless otherwise noted below, none     Procedures    FINAL IMPRESSION      1. Chest pain, unspecified type          DISPOSITION/PLAN   DISPOSITION        PATIENT REFERRED TO:  GIL Everett - CNP  [address], [address]          Sky Snowden MD  [address]  [facility] [address]  [phone]    In 3 days        DISCHARGE MEDICATIONS:  New Prescriptions    No medications on file          (Please note that portions of this note were completed with a voice recognition program.  Efforts were made to edit the dictations but occasionally words are mis-transcribed.)    Tammi Pollack PA-C (electronically signed)  Attending Emergency Physician         Tammi Pollack PA-C  11/10/20 1322    Attending Supervisory Note/Shared Visit   I have personally performed a face to face diagnostic evaluation on this patient.  I have reviewed the mid-levels findings and agree.   History and Exam by me shows chest pain      Kamini Jeffrey DO  Attending Emergency Physician         Kamini Jeffrey DO  11/10/20 3559

## 2020-11-11 PROCEDURE — 93010 ELECTROCARDIOGRAM REPORT: CPT | Performed by: INTERNAL MEDICINE

## 2020-11-25 ENCOUNTER — OFFICE VISIT (OUTPATIENT)
Dept: CARDIOLOGY CLINIC | Age: 75
End: 2020-11-25
Payer: MEDICARE

## 2020-11-25 VITALS
OXYGEN SATURATION: 98 % | DIASTOLIC BLOOD PRESSURE: 84 MMHG | TEMPERATURE: 98.7 F | WEIGHT: 233 LBS | BODY MASS INDEX: 35.31 KG/M2 | RESPIRATION RATE: 22 BRPM | HEIGHT: 68 IN | HEART RATE: 81 BPM | SYSTOLIC BLOOD PRESSURE: 134 MMHG

## 2020-11-25 PROCEDURE — 4040F PNEUMOC VAC/ADMIN/RCVD: CPT | Performed by: INTERNAL MEDICINE

## 2020-11-25 PROCEDURE — 1036F TOBACCO NON-USER: CPT | Performed by: INTERNAL MEDICINE

## 2020-11-25 PROCEDURE — G8484 FLU IMMUNIZE NO ADMIN: HCPCS | Performed by: INTERNAL MEDICINE

## 2020-11-25 PROCEDURE — 1123F ACP DISCUSS/DSCN MKR DOCD: CPT | Performed by: INTERNAL MEDICINE

## 2020-11-25 PROCEDURE — 1111F DSCHRG MED/CURRENT MED MERGE: CPT | Performed by: INTERNAL MEDICINE

## 2020-11-25 PROCEDURE — G8417 CALC BMI ABV UP PARAM F/U: HCPCS | Performed by: INTERNAL MEDICINE

## 2020-11-25 PROCEDURE — 99214 OFFICE O/P EST MOD 30 MIN: CPT | Performed by: INTERNAL MEDICINE

## 2020-11-25 PROCEDURE — 3017F COLORECTAL CA SCREEN DOC REV: CPT | Performed by: INTERNAL MEDICINE

## 2020-11-25 PROCEDURE — G8427 DOCREV CUR MEDS BY ELIG CLIN: HCPCS | Performed by: INTERNAL MEDICINE

## 2020-11-25 RX ORDER — METOPROLOL SUCCINATE 25 MG/1
25 TABLET, EXTENDED RELEASE ORAL NIGHTLY
Qty: 30 TABLET | Refills: 5 | Status: SHIPPED | OUTPATIENT
Start: 2020-11-25 | End: 2021-01-27 | Stop reason: SDUPTHER

## 2020-11-25 ASSESSMENT — ENCOUNTER SYMPTOMS
DIARRHEA: 0
BLOOD IN STOOL: 0
APNEA: 0
VOMITING: 0
CHEST TIGHTNESS: 0
SHORTNESS OF BREATH: 0
NAUSEA: 0
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
## 2020-12-01 RX ORDER — SIMVASTATIN 20 MG
TABLET ORAL
Qty: 90 TABLET | Refills: 3 | Status: SHIPPED | OUTPATIENT
Start: 2020-12-01 | End: 2021-11-29

## 2020-12-01 NOTE — TELEPHONE ENCOUNTER
requesting medication refill.  Please approve or deny this request.    Rx requested:  Requested Prescriptions     Pending Prescriptions Disp Refills    simvastatin (ZOCOR) 20 MG tablet [Pharmacy Med Name: SIMVASTATIN TABS 20MG] 90 tablet 3     Sig: TAKE 1 TABLET NIGHTLY         Last Office Visit:   11/25/2020      Next Visit Date:  Future Appointments   Date Time Provider [facility]   12/8/2020  8:00 AM SHELLY NUC MED RM 1 MALZ  NUC ME MALZ Fac RAD   12/8/2020  9:00 AM MALZ STRESS LAB RM 2 MALZ  STRESS MALZ Fac RAD   12/11/2020  9:00 AM Jada Howard MD [facility]   1/25/2021  7:30 AM GIL Chawla CNPP [facility]   1/27/2021 10:15 AM Katherine Murrieta MD [facility] [address]   1/28/2021  8:15 AM GIL Chawla CNP [facility] EMERGENCY MEDICAL [facility]

## 2020-12-07 RX ORDER — TAMSULOSIN HYDROCHLORIDE 0.4 MG/1
0.4 CAPSULE ORAL DAILY
Qty: 90 CAPSULE | Refills: 3 | Status: SHIPPED | OUTPATIENT
Start: 2020-12-07

## 2020-12-08 ENCOUNTER — HOSPITAL ENCOUNTER (OUTPATIENT)
Dept: NUCLEAR MEDICINE | Age: 75
Discharge: HOME | End: 2020-12-10
Payer: MEDICARE

## 2020-12-08 ENCOUNTER — HOSPITAL ENCOUNTER (OUTPATIENT)
Dept: NON INVASIVE DIAGNOSTICS | Age: 75
Discharge: HOME | End: 2020-12-08
Payer: MEDICARE

## 2020-12-08 VITALS — HEART RATE: 76 BPM | DIASTOLIC BLOOD PRESSURE: 90 MMHG | SYSTOLIC BLOOD PRESSURE: 138 MMHG

## 2020-12-08 PROCEDURE — 6360000002 HC RX W HCPCS: Performed by: INTERNAL MEDICINE

## 2020-12-08 PROCEDURE — 93017 CV STRESS TEST TRACING ONLY: CPT

## 2020-12-08 PROCEDURE — 3430000000 HC RX DIAGNOSTIC RADIOPHARMACEUTICAL: Performed by: INTERNAL MEDICINE

## 2020-12-08 PROCEDURE — A9502 TC99M TETROFOSMIN: HCPCS | Performed by: INTERNAL MEDICINE

## 2020-12-08 PROCEDURE — 2580000003 HC RX 258: Performed by: INTERNAL MEDICINE

## 2020-12-08 PROCEDURE — 78452 HT MUSCLE IMAGE SPECT MULT: CPT

## 2020-12-08 RX ORDER — SODIUM CHLORIDE 0.9 % (FLUSH) 0.9 %
10 SYRINGE (ML) INJECTION PRN
Status: DISCONTINUED | OUTPATIENT
Start: 2020-12-08 | End: 2020-12-11 | Stop reason: HOSPADM

## 2020-12-08 RX ADMIN — Medication 10 ML: at 08:08

## 2020-12-08 RX ADMIN — Medication 10 ML: at 09:42

## 2020-12-08 RX ADMIN — TETROFOSMIN 11.2 MILLICURIE: 1.38 INJECTION, POWDER, LYOPHILIZED, FOR SOLUTION INTRAVENOUS at 08:08

## 2020-12-08 RX ADMIN — TETROFOSMIN 33 MILLICURIE: 1.38 INJECTION, POWDER, LYOPHILIZED, FOR SOLUTION INTRAVENOUS at 09:43

## 2020-12-08 RX ADMIN — Medication 10 ML: at 09:43

## 2020-12-08 RX ADMIN — REGADENOSON 0.4 MG: 0.08 INJECTION, SOLUTION INTRAVENOUS at 09:42

## 2020-12-08 ASSESSMENT — ENCOUNTER SYMPTOMS
VOICE CHANGE: 0
COLOR CHANGE: 0
ABDOMINAL DISTENTION: 0
ANAL BLEEDING: 0
TROUBLE SWALLOWING: 0
FACIAL SWELLING: 0
WHEEZING: 0

## 2020-12-09 DIAGNOSIS — N13.8 BPH WITH OBSTRUCTION/LOWER URINARY TRACT SYMPTOMS: ICD-10-CM

## 2020-12-09 DIAGNOSIS — N40.1 BPH WITH OBSTRUCTION/LOWER URINARY TRACT SYMPTOMS: ICD-10-CM

## 2020-12-09 LAB — PROSTATE SPECIFIC ANTIGEN: 1.51 NG/ML (ref 0–6.22)

## 2020-12-10 PROCEDURE — 93018 CV STRESS TEST I&R ONLY: CPT | Performed by: INTERNAL MEDICINE

## 2020-12-11 ENCOUNTER — OFFICE VISIT (OUTPATIENT)
Dept: UROLOGY | Age: 75
End: 2020-12-11
Payer: MEDICARE

## 2020-12-11 VITALS
WEIGHT: 226 LBS | HEART RATE: 67 BPM | BODY MASS INDEX: 33.47 KG/M2 | SYSTOLIC BLOOD PRESSURE: 124 MMHG | DIASTOLIC BLOOD PRESSURE: 72 MMHG | HEIGHT: 69 IN

## 2020-12-11 PROCEDURE — G8427 DOCREV CUR MEDS BY ELIG CLIN: HCPCS | Performed by: UROLOGY

## 2020-12-11 PROCEDURE — 1123F ACP DISCUSS/DSCN MKR DOCD: CPT | Performed by: UROLOGY

## 2020-12-11 PROCEDURE — 51798 US URINE CAPACITY MEASURE: CPT | Performed by: UROLOGY

## 2020-12-11 PROCEDURE — 1036F TOBACCO NON-USER: CPT | Performed by: UROLOGY

## 2020-12-11 PROCEDURE — G8417 CALC BMI ABV UP PARAM F/U: HCPCS | Performed by: UROLOGY

## 2020-12-11 PROCEDURE — G8484 FLU IMMUNIZE NO ADMIN: HCPCS | Performed by: UROLOGY

## 2020-12-11 PROCEDURE — 3017F COLORECTAL CA SCREEN DOC REV: CPT | Performed by: UROLOGY

## 2020-12-11 PROCEDURE — 99213 OFFICE O/P EST LOW 20 MIN: CPT | Performed by: UROLOGY

## 2020-12-11 PROCEDURE — 51741 ELECTRO-UROFLOWMETRY FIRST: CPT | Performed by: UROLOGY

## 2020-12-11 PROCEDURE — 4040F PNEUMOC VAC/ADMIN/RCVD: CPT | Performed by: UROLOGY

## 2020-12-11 ASSESSMENT — ENCOUNTER SYMPTOMS: ABDOMINAL PAIN: 0

## 2020-12-11 NOTE — PROGRESS NOTES
Subjective:      Patient ID: Jerry Barker is a 76 y.o. male. HPI  This is a 72 yo white male with h/o HTN, CAD, CVA (lt eye vision loss), kidney stones, BPH w/LUTs on Proscar and Flomax back in follow-up. Since last seen on 12/11/19, he denies hematuria, has no dysuria or pain. He has stable and intermittent urgency and frequency but no UI. He has a good flow. He has no interval colic. He reports no SE from his BPH medications. I reviewed the interval PSA today. He has no new medical or surgical problems.      Lt hydrocelectomy on 1/19/15       Past Medical History:   Diagnosis Date    BPH (benign prostatic hypertrophy)     CAD (coronary artery disease)     Cerebrovascular disease     Chronic otitis externa 2/11/2016    Chronic suppurative otitis media 11/11/2015    Ear injury 2006    [address]    ED (erectile dysfunction) 11/28/2014    Hearing loss     History of nephrolithiasis     History of pneumonia 2004    History of prediabetes     Hyperlipidemia 10/10/2013    Hypertension     Hypogonadism in male     Mass of chest wall, left     Optic neuropathy, ischemic     Osteoarthritis of both hips     Paresthesias     Perforation of right tympanic membrane     chronic; Dr. Jet Hernadez PSA (prostate specific antigen) 2007- 1.12    Seborrheic keratosis 10/10/2013    Sensorineural hearing loss, bilateral     Stable angina (HCC)     Trigger thumb, left thumb      Past Surgical History:   Procedure Laterality Date    CARPAL TUNNEL RELEASE Right 05/30/2017    CCRAVEN PEARL MD    CARPAL TUNNEL RELEASE Left 06/2017    CCRAVEN PEARL MD    COLONOSCOPY  6/2015    Dr. Isela Ramsey      lower eyelids   [address] [place] [address]    LIPOMA RESECTION  3/22/16    DR. BARRAGAN    OTHER SURGICAL HISTORY Left 1/2015    hydrocelectomy    TYMPANOPLASTY      At the [address] [place] [address] History     Socioeconomic History    Marital status:       Spouse name: None    Number of children: 2    Years of education: None    Highest education level: None   Occupational History    None   Social Needs    Financial resource strain: None    Food insecurity     Worry: None     Inability: None    Transportation needs     Medical: None     Non-medical: None   Tobacco Use    Smoking status: Never Smoker    Smokeless tobacco: Never Used   Substance and Sexual Activity    Alcohol use: Yes     Comment: occasionally    Drug use: No    Sexual activity: Not Currently   Lifestyle    Physical activity     Days per week: None     Minutes per session: None    Stress: None   Relationships    Social connections     Talks on phone: None     Gets together: None     Attends Anabaptist service: None     Active member of club or organization: None     Attends meetings of clubs or organizations: None     Relationship status: None    Intimate partner violence     Fear of current or ex partner: None     Emotionally abused: None     Physically abused: None     Forced sexual activity: None   Other Topics Concern    None   Social History Narrative    2 boys in [place]. Retired from [employer]. Family History   Problem Relation Age of Onset    Stroke Father      Current Outpatient Medications   Medication Sig Dispense Refill    tamsulosin (FLOMAX) 0.4 MG capsule Take 1 capsule by mouth daily 90 capsule 3    simvastatin (ZOCOR) 20 MG tablet TAKE 1 TABLET NIGHTLY 90 tablet 3    metoprolol succinate (TOPROL XL) 25 MG extended release tablet Take 1 tablet by mouth nightly 30 tablet 5    finasteride (PROSCAR) 5 MG tablet TAKE 1 TABLET DAILY 90 tablet 3    irbesartan (AVAPRO) 150 MG tablet TAKE 1 TABLET DAILY 90 tablet 3    Naphazoline-Glycerin (CLEAR EYES COOLING COMFORT OP) Apply to eye      Ascorbic Acid (VITAMIN C) 500 MG tablet Take 500 mg by mouth daily      Multiple Vitamins-Minerals (MULTIVITAMIN PO) Take by mouth      aspirin 81 MG tablet Take 81 mg by mouth daily. No current facility-administered medications for this visit. Patient has no known allergies. reviewed    Review of Systems   Constitutional: Negative for unexpected weight change. Gastrointestinal: Negative for abdominal pain. Genitourinary: Negative for dysuria, enuresis, flank pain and hematuria. Objective:   Physical Exam  Constitutional:       Appearance: Normal appearance. Genitourinary:     Prostate: Enlarged. Not tender and no nodules present. Rectum: No external hemorrhoid. Neurological:      Mental Status: He is alert. Psychiatric:         Mood and Affect: Mood normal.           PSA   Date Value Ref Range Status   12/09/2020 1.51 0.00 - 6.22 ng/mL Final   12/09/2019 1.27 0.00 - 6.22 ng/mL Final   12/10/2018 1.42 0.00 - 6.22 ng/mL Final   11/15/2017 1.75 0.00 - 6.22 ng/mL Final   11/16/2016 1.90 0.00 - 6.22 ng/mL Final     Diagnostic Psa   Date Value Ref Range Status   05/05/2014 1.41 0.00 - 5.40 ng/mL Final   04/24/2013 1.3 0.0 - 4.0 ng/mL Final     Uroflow: 2 ml/sec, 18 cc  post void residual by U/S: 93 cc  Assessment: This is a 72 yo white male with h/o HTN, CAD, CVA (lt eye vision loss), kidney stones, BPH w/LUTs on Proscar and Flomax (stable) with stable bladder emptying. He has a normal and stable PSA even corrected for use of 5 LINDA and he wants to continue with yearly PSA testing. Plan:      1.  F/U 1 yr for PSA and Flow/PVR        aZina Heart MD

## 2020-12-14 NOTE — PROCEDURES
[address]                              CARDIAC STRESS TEST    PATIENT NAME: Keith Ferreira                    :        1945  MED REC NO:   [id number]                              ROOM:  ACCOUNT NO:   [de-identified]                           ADMIT DATE: 2020  PROVIDER:     Franky Luna MD    CARDIOVASCULAR DIAGNOSTIC DEPARTMENT    DATE OF STUDY:  2020    [facility]    ORDERING PROVIDERS:  Kiana Wilkes NP and Franky Luna MD    TECHNIQUE:  At rest, the patient was injected with 11.2 mCi of Myoview. Resting images were obtained. The patient was then given 0.4 mg of  Lexiscan followed by administration of 33.0 mCi of Myoview. Stress  tomographic images were then obtained. Left ventricular ejection  fraction and gated wall motion were acquired. RESULTS:  Resting heart rate 65 beats per minute. Maximum heart rate  achieved was 80 beats per minute. No diagnostic ST-segment changes were  noted. IMAGING RESULTS:  Review of rest and stress tomographic images revealed  homogenous myocardial perfusion with no evidence of prior myocardial  infarction or ischemia. Left ventricular ejection fraction is normal at  50%. TID ratio 1.0, which is within normal limits. IMPRESSION:  1. Homogenous myocardial perfusion with no evidence of prior myocardial  infarction or ischemia. 2.  Normal left ventricular ejection fraction. 3.  Normal TID ratio.         Adeel Greene MD    D: 2020 #10:56:16       T: 2020 11:47:59     [email or web address]  Job#: [id number]     Doc#: [id number]    CC:

## 2020-12-20 RX ORDER — IRBESARTAN 150 MG/1
TABLET ORAL
Qty: 90 TABLET | Refills: 1 | Status: SHIPPED | OUTPATIENT
Start: 2020-12-20 | End: 2021-05-26

## 2021-01-26 ENCOUNTER — OFFICE VISIT (OUTPATIENT)
Dept: FAMILY MEDICINE CLINIC | Age: 76
End: 2021-01-26
Payer: MEDICARE

## 2021-01-26 VITALS
SYSTOLIC BLOOD PRESSURE: 128 MMHG | OXYGEN SATURATION: 99 % | BODY MASS INDEX: 35.49 KG/M2 | HEART RATE: 76 BPM | DIASTOLIC BLOOD PRESSURE: 82 MMHG | TEMPERATURE: 98.2 F | WEIGHT: 239.6 LBS | HEIGHT: 69 IN

## 2021-01-26 DIAGNOSIS — I10 ESSENTIAL HYPERTENSION: ICD-10-CM

## 2021-01-26 DIAGNOSIS — Z00.00 ROUTINE GENERAL MEDICAL EXAMINATION AT A HEALTH CARE FACILITY: Primary | ICD-10-CM

## 2021-01-26 DIAGNOSIS — E78.2 MIXED HYPERLIPIDEMIA: ICD-10-CM

## 2021-01-26 DIAGNOSIS — Z12.5 SCREENING PSA (PROSTATE SPECIFIC ANTIGEN): ICD-10-CM

## 2021-01-26 PROCEDURE — 4040F PNEUMOC VAC/ADMIN/RCVD: CPT | Performed by: NURSE PRACTITIONER

## 2021-01-26 PROCEDURE — G0439 PPPS, SUBSEQ VISIT: HCPCS | Performed by: NURSE PRACTITIONER

## 2021-01-26 PROCEDURE — G8484 FLU IMMUNIZE NO ADMIN: HCPCS | Performed by: NURSE PRACTITIONER

## 2021-01-26 PROCEDURE — 1123F ACP DISCUSS/DSCN MKR DOCD: CPT | Performed by: NURSE PRACTITIONER

## 2021-01-26 PROCEDURE — 3017F COLORECTAL CA SCREEN DOC REV: CPT | Performed by: NURSE PRACTITIONER

## 2021-01-26 ASSESSMENT — PATIENT HEALTH QUESTIONNAIRE - PHQ9
1. LITTLE INTEREST OR PLEASURE IN DOING THINGS: 0
SUM OF ALL RESPONSES TO PHQ QUESTIONS 1-9: 0
2. FEELING DOWN, DEPRESSED OR HOPELESS: 0

## 2021-01-26 ASSESSMENT — LIFESTYLE VARIABLES: HOW OFTEN DO YOU HAVE A DRINK CONTAINING ALCOHOL: 0

## 2021-01-26 NOTE — PROGRESS NOTES
chest wall, left     Optic neuropathy, ischemic     Osteoarthritis of both hips     Paresthesias     Perforation of right tympanic membrane     chronic; Dr. Ashlee Seals Screening PSA (prostate specific antigen) 2007- 1.12    Seborrheic keratosis 10/10/2013    Sensorineural hearing loss, bilateral     Stable angina (HCC)     Trigger thumb, left thumb        Past Surgical History:   Procedure Laterality Date    CARPAL TUNNEL RELEASE Right 05/30/2017    CCRAVEN PEARL MD    CARPAL TUNNEL RELEASE Left 06/2017    [facility]    COLONOSCOPY  6/2015    Dr. Ela Dawson      lower eyelids   [address] [place] [address]    LIPOMA RESECTION  3/22/16    DR. BARRAGAN    OTHER SURGICAL HISTORY Left 1/2015    hydrocelectomy    TYMPANOPLASTY      At the [place]         Family History   Problem Relation Age of Onset    Stroke Father        CareTeam (Including outside providers/suppliers regularly involved in providing care):   Patient Care Team:  GIL Magana CNP as PCP - General (Family Nurse Practitioner)  GIL Magana CNP as PCP - [facility] Empaneled Provider  Dianna Perez MD (Urology)  Gladys Cano MD (Family Medicine)  Kleber Houston MD (General Surgery)    Wt Readings from Last 3 Encounters:   01/26/21 239 lb 9.6 oz (108.7 kg)   12/11/20 226 lb (102.5 kg)   11/25/20 233 lb (105.7 kg)     Vitals:    01/26/21 0821   BP: 128/82   Site: Left Upper Arm   Position: Sitting   Cuff Size: Large Adult   Pulse: 76   Temp: 98.2 °F (36.8 °C)   SpO2: 99%   Weight: 239 lb 9.6 oz (108.7 kg)   Height: 5' 8.5\" (1.74 m)     Body mass index is 35.9 kg/m². Based upon direct observation of the patient, evaluation of cognition reveals remote memory intact, recent memory impaired.     General Appearance: alert and oriented to person, place and time, well-developed and well-nourished, in no acute distress  Skin: warm and dry, no rash or erythema  ENT: tympanic membrane, external ear and ear canal normal bilaterally, oropharynx clear and moist with normal mucous membranes and hearing abnormal- decreased hearing. [facility] Neck: neck supple and non tender without mass, no thyromegaly or thyroid nodules, no cervical lymphadenopathy   Pulmonary/Chest: clear to auscultation bilaterally- no wheezes, rales or rhonchi, normal air movement, no respiratory distress  Cardiovascular: normal rate, normal S1 and S2, no gallops, intact distal pulses and no carotid bruits  Abdomen: soft, non-tender  Extremities: no cyanosis and no clubbing  Musculoskeletal: normal range of motion, no joint swelling, deformity or tenderness  Neurologic: gait and coordination normal and speech normal    Patient's complete Health Risk Assessment and screening values have been reviewed and are found in Flowsheets. The following problems were reviewed today and where indicated follow up appointments were made and/or referrals ordered. Positive Risk Factor Screenings with Interventions:      Cognitive: Words recalled: 2 Words Recalled  Clock Drawing Test (CDT) Score: (!) Abnormal  Total Score Interpretation: Positive Mini-Cog  Cognitive Impairment Interventions:  · Patient declines any further evaluation/treatment for cognitive impairment         General Health and ACP:  General  In general, how would you say your health is?: Good  In the past 7 days, have you experienced any of the following?  New or Increased Pain, New or Increased Fatigue, Loneliness, Social Isolation, Stress or Anger?: None of These  Do you get the social and emotional support that you need?: Yes  Do you have a Living Will?: Yes  Advance Directives     Power of [address] [place] [address] Will ACP-Advance Directive ACP-Power of     Not on File Not on File Not on File Not on File      General Health Risk Interventions:  · Will get us copy of living will    Health Habits/Nutrition:  Health Habits/Nutrition  Do you exercise for at least 20 minutes 2-3 times per week?: (!) No  Have you lost any weight without trying in the past 3 months?: No  Do you eat fewer than 2 meals per day?: No  Have you seen a dentist within the past year?: Yes  Body mass index: (!) 35.9  Health Habits/Nutrition Interventions:  · Encouraged to walk and get up and moving more, even during winter months.     Hearing/Vision:  No exam data present  Hearing/Vision  Do you or your family notice any trouble with your hearing?: (!) Yes  Do you have difficulty driving, watching TV, or doing any of your daily activities because of your eyesight?: No  Have you had an eye exam within the past year?: Yes  Hearing/Vision Interventions:  · through [address]:  Safety  Do you have working smoke detectors?: Yes  Have all throw rugs been removed or fastened?: Yes  Do you have non-slip mats or surfaces in all bathtubs/showers?: Yes  Do all of your stairways have a railing or banister?: (!) No  Are your doorways, halls and stairs free of clutter?: Yes  Do you always fasten your seatbelt when you are in a car?: Yes  Safety Interventions:  · Patient declines any further evaluation/treatment for this issue     Personalized Preventive Plan   Current Health Maintenance Status  Immunization History   Administered Date(s) Administered    COVID-19, Pfizer, 30mcg/0.3ml Dose 01/22/2021    Influenza A ([id number]) Vaccine PF IM 11/21/2009    Influenza Vaccine, unspecified formulation 11/08/2007, 09/28/2011, 11/10/2016    Influenza Virus Vaccine 09/27/2012, 10/08/2013, 09/28/2014    Influenza Whole 09/28/2014    Influenza, High Dose (Fluzone 65 yrs and older) 11/10/2016, 09/19/2017, 09/11/2018    Influenza, Quadv, IM, PF (6 mo and older Fluzone, Flulaval, Fluarix, and 3 yrs and older Afluria) 10/08/2013    Influenza, Quadv, adjuvanted, 65 yrs +, IM, PF (Fluad) 10/01/2020    Pneumococcal Conjugate 13-valent ([id number]) 12/03/2015    Pneumococcal Polysaccharide ([id number]) 03/14/2011    Pneumococcal Vaccine

## 2021-01-26 NOTE — PATIENT INSTRUCTIONS
Personalized Preventive Plan for Miriam Vaughn - 1/26/2021  Medicare offers a range of preventive health benefits. Some of the tests and screenings are paid in full while other may be subject to a deductible, co-insurance, and/or copay. Some of these benefits include a comprehensive review of your medical history including lifestyle, illnesses that may run in your family, and various assessments and screenings as appropriate. After reviewing your medical record and screening and assessments performed today your provider may have ordered immunizations, labs, imaging, and/or referrals for you. A list of these orders (if applicable) as well as your Preventive Care list are included within your After Visit Summary for your review. Other Preventive Recommendations:    · A preventive eye exam performed by an eye specialist is recommended every 1-2 years to screen for glaucoma; cataracts, macular degeneration, and other eye disorders. · A preventive dental visit is recommended every 6 months. · Try to get at least 150 minutes of exercise per week or 10,000 steps per day on a pedometer . · Order or download the FREE \"Exercise & Physical Activity: Your Everyday Guide\" from [employer]. Call 6-661.933.8316 or search [employer] online. · You need 9271-2587 mg of calcium and 7387-3826 IU of vitamin D per day. It is possible to meet your calcium requirement with diet alone, but a vitamin D supplement is usually necessary to meet this goal.  · When exposed to the sun, use a sunscreen that protects against both UVA and UVB radiation with an SPF of 30 or greater. Reapply every 2 to 3 hours or after sweating, drying off with a towel, or swimming. · Always wear a seat belt when traveling in a car. Always wear a helmet when riding a bicycle or motorcycle.

## 2021-01-27 ENCOUNTER — OFFICE VISIT (OUTPATIENT)
Dept: CARDIOLOGY CLINIC | Age: 76
End: 2021-01-27
Payer: MEDICARE

## 2021-01-27 VITALS
DIASTOLIC BLOOD PRESSURE: 82 MMHG | WEIGHT: 240 LBS | OXYGEN SATURATION: 98 % | BODY MASS INDEX: 35.55 KG/M2 | RESPIRATION RATE: 22 BRPM | HEART RATE: 78 BPM | SYSTOLIC BLOOD PRESSURE: 124 MMHG | HEIGHT: 69 IN

## 2021-01-27 DIAGNOSIS — I10 ESSENTIAL HYPERTENSION: ICD-10-CM

## 2021-01-27 PROCEDURE — 1123F ACP DISCUSS/DSCN MKR DOCD: CPT | Performed by: INTERNAL MEDICINE

## 2021-01-27 PROCEDURE — 1036F TOBACCO NON-USER: CPT | Performed by: INTERNAL MEDICINE

## 2021-01-27 PROCEDURE — G8484 FLU IMMUNIZE NO ADMIN: HCPCS | Performed by: INTERNAL MEDICINE

## 2021-01-27 PROCEDURE — G8417 CALC BMI ABV UP PARAM F/U: HCPCS | Performed by: INTERNAL MEDICINE

## 2021-01-27 PROCEDURE — G8427 DOCREV CUR MEDS BY ELIG CLIN: HCPCS | Performed by: INTERNAL MEDICINE

## 2021-01-27 PROCEDURE — 99213 OFFICE O/P EST LOW 20 MIN: CPT | Performed by: INTERNAL MEDICINE

## 2021-01-27 PROCEDURE — 3017F COLORECTAL CA SCREEN DOC REV: CPT | Performed by: INTERNAL MEDICINE

## 2021-01-27 PROCEDURE — 4040F PNEUMOC VAC/ADMIN/RCVD: CPT | Performed by: INTERNAL MEDICINE

## 2021-01-27 RX ORDER — METOPROLOL SUCCINATE 25 MG/1
25 TABLET, EXTENDED RELEASE ORAL NIGHTLY
Qty: 90 TABLET | Refills: 2 | Status: SHIPPED | OUTPATIENT
Start: 2021-01-27 | End: 2021-07-28 | Stop reason: SDUPTHER

## 2021-01-27 ASSESSMENT — ENCOUNTER SYMPTOMS
CHEST TIGHTNESS: 0
SHORTNESS OF BREATH: 0
VOMITING: 0
APNEA: 0
DIARRHEA: 0
BLOOD IN STOOL: 0
NAUSEA: 0
COLOR CHANGE: 0

## 2021-01-27 NOTE — PROGRESS NOTES
[facility] CARDIOLOGY OFFICE FOLLOW-UP      Patient: Lorrie Grigsby  YOB: 1945  MRN: [id number]    Chief Complaint:  Chief Complaint   Patient presents with    Results     Stress test    Chest Pain    Hypertension         Subjective/HPI:  1/27/21: Patient presents today for follow-up of chest pain. The chest pain has resolved. Stress test is negative. He is physically very active. He is known to have occluded right with collaterals from the left. He did get his first vaccination at the [place]. In the results of the stress test.  No restrictions. See me in 6 months       11/25/2020: Patient presents today for evaluation of chest pain. He was recently seen in the emergency room at [facility]. He is physically very active he is known to have occluded right coronary artery with collaterals from the left. He was discharged home from the ER to be followed by me. He has a history of hypertension BPH and dyslipidemia. Chest pain he thinks was due to him overdoing on the bike when he was riding it. No nausea no vomiting. Cardiac markers were negative. Total CPK was 213. I am going to set him up for stress Cardiolite. Give a prescription for nitro and then see me after testing.        12/2/19: Patient presents today for follow-up of coronary artery disease.  Has totally occluded right with collaterals from the left.  No angina congestive heart failure.  Syncope dizziness.  On statin and arm.  See me in 6 months.     6/3/19: Patient presents today for follow-up of coronary artery disease. Has totally occluded right coronary . Very active. Stress test is negative for ischemia. He will see me in 6 months.     4/8/19: Patient presents today for follow-up of coronary artery disease. Has totally occluded right by previous cardiac catheterization. Very active. Occasional chest pain. Has hypertension and hypertensive heart disease.  He'll be scheduled to have a Lexiscan and then follow with me      4/9/2018: Follow-up of coronary artery disease. Has totally occluded right coronary artery. No angina. He has atypical chest pain below her left side of the breast going to the back. He is going to be seeing a chiropractor. Has hypertension that stable. See me in one year.      4/10/17: For follow-up of CAD. He has totally occluded right coronary artery. Collaterals from the left. No angina congestive heart failure symptoms of syncope. Fairly active. Breathing house has hypertension and hyperlipidemia that is stable. See me in 6 months     10/10/2016: 12 follow-up of CAD. He is noted to have occluded right coronary artery with collaterals from the left. He went to [place] and had a great time. No chest pain and congestive heart failure symptoms or syncope. He is fairly active. He is status post lipoma removal by [facility]. Has HLP. See in [facility]     4/11/2016: For followup of CAD. RCA occluded. Collaterals from L. No angina,CHF or syncopy. Had cyst/lipoma removed from chest by Dr Angel Ashraf. Has prior history of CVA. No dizziness or syncopy. Will mail him a CD of his cath to his home.           Past Medical History:   Diagnosis Date    BPH (benign prostatic hypertrophy)     CAD (coronary artery disease)     Cerebrovascular disease     Chronic otitis externa 2/11/2016    Chronic suppurative otitis media 11/11/2015    Ear injury 2006    [facility]    ED (erectile dysfunction) 11/28/2014    Hearing loss     History of nephrolithiasis     History of pneumonia 2004    History of prediabetes     Hyperlipidemia 10/10/2013    Hypertension     Hypogonadism in male     Mass of chest wall, left     Optic neuropathy, ischemic     Osteoarthritis of both hips     Paresthesias     Perforation of right tympanic membrane     chronic; Dr. Donnelly Smoker PSA (prostate specific antigen) 2007- 1.12    Seborrheic keratosis 10/10/2013    Sensorineural hearing loss, bilateral     Stable angina ([address]     Trigger thumb, left thumb        Past Surgical History:   Procedure Laterality Date    CARPAL TUNNEL RELEASE Right 05/30/2017    ESME PEARL MD    CARPAL TUNNEL RELEASE Left 06/2017    CCRAVEN PEARL MD    COLONOSCOPY  6/2015    Dr. Oskar Persaud      lower eyelids   [facility]    LIPOMA RESECTION  3/22/16    DR. BARRAGAN    OTHER SURGICAL HISTORY Left 1/2015    hydrocelectomy    TYMPANOPLASTY      At the [facility]       Family History   Problem Relation Age of Onset    Stroke Father        Social History     Socioeconomic History    Marital status:      Spouse name: None    Number of children: 2    Years of education: None    Highest education level: None   Occupational History    None   Social Needs    Financial resource strain: None    Food insecurity     Worry: None     Inability: None    Transportation needs     Medical: None     Non-medical: None   Tobacco Use    Smoking status: Never Smoker    Smokeless tobacco: Never Used   Substance and Sexual Activity    Alcohol use: Yes     Comment: occasionally    Drug use: No    Sexual activity: Not Currently   Lifestyle    Physical activity     Days per week: None     Minutes per session: None    Stress: None   Relationships    Social connections     Talks on phone: None     Gets together: None     Attends Jainism service: None     Active member of club or organization: None     Attends meetings of clubs or organizations: None     Relationship status: None    Intimate partner violence     Fear of current or ex partner: None     Emotionally abused: None     Physically abused: None     Forced sexual activity: None   Other Topics Concern    None   Social History Narrative    2 boys in [place]. Retired from [employer].        No Known Allergies    Current Outpatient Medications   Medication Sig Dispense Refill    metoprolol succinate (TOPROL XL) 25 MG extended release tablet Take 1 tablet by mouth nightly 90 tablet 2    irbesartan (AVAPRO) 150 MG tablet TAKE 1 TABLET DAILY 90 tablet 1    tamsulosin (FLOMAX) 0.4 MG capsule Take 1 capsule by mouth daily 90 capsule 3    simvastatin (ZOCOR) 20 MG tablet TAKE 1 TABLET NIGHTLY 90 tablet 3    finasteride (PROSCAR) 5 MG tablet TAKE 1 TABLET DAILY 90 tablet 3    Naphazoline-Glycerin (CLEAR EYES COOLING COMFORT OP) Apply to eye      Ascorbic Acid (VITAMIN C) 500 MG tablet Take 500 mg by mouth daily      Multiple Vitamins-Minerals (MULTIVITAMIN PO) Take by mouth      aspirin 81 MG tablet Take 81 mg by mouth daily. No current facility-administered medications for this visit. Review of Systems:   Review of Systems   Constitutional: Negative for appetite change, diaphoresis and fatigue. HENT: Negative for nosebleeds. Respiratory: Negative for apnea, chest tightness and shortness of breath. Cardiovascular: Negative for chest pain, palpitations and leg swelling. Gastrointestinal: Negative for blood in stool, diarrhea, nausea and vomiting. Musculoskeletal: Negative for myalgias, neck pain and neck stiffness. Skin: Negative for color change, pallor, rash and wound. Neurological: Negative for dizziness, seizures, syncope, weakness, light-headedness, numbness and headaches. Hematological: Does not bruise/bleed easily. Psychiatric/Behavioral: Negative for agitation, behavioral problems and confusion. The patient is not nervous/anxious and is not hyperactive. All other systems reviewed and are negative. Review of System is negative except for as mentioned above. Physical Examination:    /82 (Site: Left Upper Arm, Position: Sitting, Cuff Size: Medium Adult)   Pulse 78   Resp 22   Ht 5' 8.5\" (1.74 m)   Wt 240 lb (108.9 kg)   SpO2 98%   BMI 35.96 kg/m²    Physical Exam   Constitutional: He appears healthy. HENT:   Nose: Nose normal.   Mouth/Throat: Dentition is normal. Oropharynx is clear.    Eyes: Pupils are equal, round, and reactive to light. Neck: Normal range of motion. Cardiovascular: Normal rate, regular rhythm, S1 normal, S2 normal, normal heart sounds, intact distal pulses and normal pulses. No extrasystoles are present. Exam reveals no gallop. No murmur heard. Pulmonary/Chest: Effort normal and breath sounds normal. He has no wheezes. He has no rales. He exhibits no tenderness. Abdominal: Soft. Bowel sounds are normal. He exhibits no distension and no mass. There is no splenomegaly or hepatomegaly. There is no abdominal tenderness. Musculoskeletal: Normal range of motion. General: No tenderness, deformity or edema. Neurological: He is alert and oriented to person, place, and time. He has normal motor skills and normal reflexes. Gait normal.   Skin: Skin is warm and dry. Patient Active Problem List   Diagnosis    Hypertension    Benign prostatic hyperplasia    Cerebrovascular disease    ED (erectile dysfunction)    Sensorineural hearing loss, bilateral    Hyperlipidemia    CAD (coronary artery disease)    Other forms of angina pectoris (HCC)    Seborrheic keratoses    Chronic otitis externa    Chronic suppurative otitis media    Trigger thumb, left thumb    Hypogonadism in male    Perforation of right tympanic membrane    Osteoarthritis of both hips    Morbidly obese ([facility] [address]           No orders of the defined types were placed in this encounter. Orders Placed This Encounter   Medications    metoprolol succinate (TOPROL XL) 25 MG extended release tablet     Sig: Take 1 tablet by mouth nightly     Dispense:  90 tablet     Refill:  2             Assessment/Orders:       ICD-10-CM    1.  Essential hypertension  I10 metoprolol succinate (TOPROL XL) 25 MG extended release tablet       Orders Placed This Encounter   Medications    metoprolol succinate (TOPROL XL) 25 MG extended release tablet     Sig: Take 1 tablet by mouth nightly     Dispense:  90 tablet     Refill:  2 Medications Discontinued During This Encounter   Medication Reason    metoprolol succinate (TOPROL XL) 25 MG extended release tablet REORDER       No orders of the defined types were placed in this encounter. Plan:  Prescribed Toprol 25mg Nightly    Stay on same medications. See me in 6 months.         Electronically signed by: Luis E Zhao MD  2/2/2021 9:47 AM

## 2021-04-02 DIAGNOSIS — Z12.5 SCREENING PSA (PROSTATE SPECIFIC ANTIGEN): ICD-10-CM

## 2021-04-02 DIAGNOSIS — E78.2 MIXED HYPERLIPIDEMIA: ICD-10-CM

## 2021-04-02 DIAGNOSIS — I10 ESSENTIAL HYPERTENSION: ICD-10-CM

## 2021-04-02 LAB
ALBUMIN SERPL-MCNC: 4 G/DL (ref 3.5–4.6)
ALP BLD-CCNC: 79 U/L (ref 35–104)
ALT SERPL-CCNC: 28 U/L (ref 0–41)
ANION GAP SERPL CALCULATED.3IONS-SCNC: 9 MEQ/L (ref 9–15)
AST SERPL-CCNC: 23 U/L (ref 0–40)
BILIRUB SERPL-MCNC: 0.5 MG/DL (ref 0.2–0.7)
BUN BLDV-MCNC: 19 MG/DL (ref 8–23)
CALCIUM SERPL-MCNC: 9 MG/DL (ref 8.5–9.9)
CHLORIDE BLD-SCNC: 104 MEQ/L (ref 95–107)
CHOLESTEROL, TOTAL: 127 MG/DL (ref 0–199)
CO2: 26 MEQ/L (ref 20–31)
CREAT SERPL-MCNC: 0.86 MG/DL (ref 0.7–1.2)
GFR AFRICAN AMERICAN: >60
GFR NON-AFRICAN AMERICAN: >60
GLOBULIN: 2.7 G/DL (ref 2.3–3.5)
GLUCOSE BLD-MCNC: 79 MG/DL (ref 70–99)
HDLC SERPL-MCNC: 33 MG/DL (ref 40–59)
LDL CHOLESTEROL CALCULATED: 60 MG/DL (ref 0–129)
POTASSIUM SERPL-SCNC: 4.4 MEQ/L (ref 3.4–4.9)
PROSTATE SPECIFIC ANTIGEN: 1.35 NG/ML (ref 0–6.22)
SODIUM BLD-SCNC: 139 MEQ/L (ref 135–144)
TOTAL PROTEIN: 6.7 G/DL (ref 6.3–8)
TRIGL SERPL-MCNC: 170 MG/DL (ref 0–150)

## 2021-04-14 DIAGNOSIS — N40.1 BPH WITH OBSTRUCTION/LOWER URINARY TRACT SYMPTOMS: ICD-10-CM

## 2021-04-14 DIAGNOSIS — N13.8 BPH WITH OBSTRUCTION/LOWER URINARY TRACT SYMPTOMS: ICD-10-CM

## 2021-04-14 RX ORDER — FINASTERIDE 5 MG/1
TABLET, FILM COATED ORAL
Qty: 90 TABLET | Refills: 3 | Status: SHIPPED | OUTPATIENT
Start: 2021-04-14 | End: 2022-04-11

## 2021-05-26 DIAGNOSIS — I10 ESSENTIAL HYPERTENSION: ICD-10-CM

## 2021-05-26 RX ORDER — IRBESARTAN 150 MG/1
TABLET ORAL
Qty: 90 TABLET | Refills: 3 | Status: SHIPPED | OUTPATIENT
Start: 2021-05-26 | End: 2022-04-29 | Stop reason: SDUPTHER

## 2021-07-28 DIAGNOSIS — I10 ESSENTIAL HYPERTENSION: ICD-10-CM

## 2021-07-28 RX ORDER — METOPROLOL SUCCINATE 25 MG/1
25 TABLET, EXTENDED RELEASE ORAL NIGHTLY
Qty: 90 TABLET | Refills: 2 | Status: SHIPPED | OUTPATIENT
Start: 2021-07-28 | End: 2022-04-29 | Stop reason: SDUPTHER

## 2021-07-28 NOTE — TELEPHONE ENCOUNTER
requesting medication refill.  Please approve or deny this request.    Rx requested:  Requested Prescriptions      No prescriptions requested or ordered in this encounter         Last Office Visit:   1/27/2021      Next Visit Date:  Future Appointments   Date Time Provider Hima Brewsteri   7/29/2021  8:30 AM Champ Flores MD [facility] [place]   12/14/2021  9:15 AM Mike Lao MD [facility]   1/27/2022  8:15 AM GIL Foley - CNP [facility]

## 2021-11-01 ENCOUNTER — OFFICE VISIT (OUTPATIENT)
Dept: CARDIOLOGY CLINIC | Age: 76
End: 2021-11-01
Payer: MEDICARE

## 2021-11-01 VITALS
OXYGEN SATURATION: 99 % | BODY MASS INDEX: 34.07 KG/M2 | DIASTOLIC BLOOD PRESSURE: 80 MMHG | HEART RATE: 76 BPM | HEIGHT: 69 IN | SYSTOLIC BLOOD PRESSURE: 124 MMHG | WEIGHT: 230 LBS

## 2021-11-01 DIAGNOSIS — I10 PRIMARY HYPERTENSION: Primary | ICD-10-CM

## 2021-11-01 PROCEDURE — G8417 CALC BMI ABV UP PARAM F/U: HCPCS | Performed by: INTERNAL MEDICINE

## 2021-11-01 PROCEDURE — 1036F TOBACCO NON-USER: CPT | Performed by: INTERNAL MEDICINE

## 2021-11-01 PROCEDURE — G8427 DOCREV CUR MEDS BY ELIG CLIN: HCPCS | Performed by: INTERNAL MEDICINE

## 2021-11-01 PROCEDURE — 4040F PNEUMOC VAC/ADMIN/RCVD: CPT | Performed by: INTERNAL MEDICINE

## 2021-11-01 PROCEDURE — 93000 ELECTROCARDIOGRAM COMPLETE: CPT | Performed by: INTERNAL MEDICINE

## 2021-11-01 PROCEDURE — 1123F ACP DISCUSS/DSCN MKR DOCD: CPT | Performed by: INTERNAL MEDICINE

## 2021-11-01 PROCEDURE — G8484 FLU IMMUNIZE NO ADMIN: HCPCS | Performed by: INTERNAL MEDICINE

## 2021-11-01 PROCEDURE — 99213 OFFICE O/P EST LOW 20 MIN: CPT | Performed by: INTERNAL MEDICINE

## 2021-11-01 ASSESSMENT — ENCOUNTER SYMPTOMS
CHEST TIGHTNESS: 0
BLOOD IN STOOL: 0
SHORTNESS OF BREATH: 1
NAUSEA: 0
VOMITING: 0

## 2021-11-01 NOTE — PROGRESS NOTES
[facility] CARDIOLOGY OFFICE FOLLOW-UP      Patient: Jessenia Estrada  YOB: 1945  MRN: [id number]    Chief Complaint:  Chief Complaint   Patient presents with    Follow-up    Hypertension         Subjective/HPI:  11/01/21: Patient presents today for double chest pain. He is known to have totally occluded right getting collaterals from the left. Still very active 68years old has gained some weight. Slowing down his bed. No angina or congestive heart failure symptoms. Blood pressure remains controlled. Has dyslipidemia and BPH that is controlled. He will see me in 6 months        1/27/21: Patient presents today for follow-up of chest pain. The chest pain has resolved. Stress test is negative. He is physically very active. He is known to have occluded right with collaterals from the left. He did get his first vaccination at the [place]. In the results of the stress test.  No restrictions. See me in 6 months        11/25/2020: Patient presents today for evaluation of chest pain. Norma Arredondo was recently seen in the emergency room at [facility] is physically very active he is known to have occluded right coronary artery with collaterals from the left. Norma Arredondo was discharged home from the ER to be followed by me. Norma Arredondo has a history of hypertension BPH and dyslipidemia.  Chest pain he thinks was due to him overdoing on the bike when he was riding it.  No nausea no vomiting.  Cardiac markers were negative.  Total CPK was 213.  I am going to set him up for stress Cardiolite.  Give a prescription for nitro and then see me after testing.        12/2/19: Patient presents today for follow-up of coronary artery disease.  Has totally occluded right with collaterals from the left.  No angina congestive heart failure.  Syncope dizziness.  On statin and arm.  See me in 6 months.       6/3/19: Patient presents today for follow-up of coronary artery disease. Has totally occluded right coronary . Very active.  Stress test is negative for ischemia. He will see me in 6 months.       4/8/19: Patient presents today for follow-up of coronary artery disease. Has totally occluded right by previous cardiac catheterization. Very active. Occasional chest pain. Has hypertension and hypertensive heart disease. He'll be scheduled to have a Lexiscan and then follow with me        4/9/2018: Follow-up of coronary artery disease. Has totally occluded right coronary artery. No angina. He has atypical chest pain below her left side of the breast going to the back. He is going to be seeing a chiropractor. Has hypertension that stable. See me in one year.        4/10/17: For follow-up of CAD. He has totally occluded right coronary artery. Collaterals from the left. No angina congestive heart failure symptoms of syncope. Fairly active. Breathing house has hypertension and hyperlipidemia that is stable. See me in 6 months       10/10/2016: 12 follow-up of CAD. He is noted to have occluded right coronary artery with collaterals from the left. He went to [place] and had a great time. No chest pain and congestive heart failure symptoms or syncope. He is fairly active. He is status post lipoma removal by [facility]. Has HLP. See in [facility]       4/11/2016: For followup of CAD. RCA occluded. Collaterals from L. No angina,CHF or syncopy. Had cyst/lipoma removed from chest by Dr Anshul Pierre. Has prior history of CVA. No dizziness or syncopy. Will mail him a CD of his cath to his home.                 Past Medical History:   Diagnosis Date    BPH (benign prostatic hypertrophy)     CAD (coronary artery disease)     Cerebrovascular disease     Chronic otitis externa 2/11/2016    Chronic suppurative otitis media 11/11/2015    Ear injury 2006    [facility]    ED (erectile dysfunction) 11/28/2014    Hearing loss     History of nephrolithiasis     History of pneumonia 2004    History of prediabetes     Hyperlipidemia 10/10/2013    Hypertension     Hypogonadism in male     Mass of chest wall, left     Optic neuropathy, ischemic     Osteoarthritis of both hips     Paresthesias     Perforation of right tympanic membrane     chronic; Dr. Leonid Logan Screening PSA (prostate specific antigen) 2007- 1.12    Seborrheic keratosis 10/10/2013    Sensorineural hearing loss, bilateral     Stable angina (HCC)     Trigger thumb, left thumb        Past Surgical History:   Procedure Laterality Date    CARPAL TUNNEL RELEASE Right 05/30/2017    ESME PEARL MD    CARPAL TUNNEL RELEASE Left 06/2017    ESME PEARL MD    COLONOSCOPY  6/2015    Dr. Hurd Lung      lower eyelids   [facility] RESECTION  3/22/16    DR. BARRAGAN    OTHER SURGICAL HISTORY Left 1/2015    hydrocelectomy    T[facility]MPANOPLASTY      At the [facility]       Family History   Problem Relation Age of Onset    Stroke Father        Social History     Socioeconomic History    Marital status:      Spouse name: Not on file    Number of children: 2    Years of education: Not on file    Highest education level: Not on file   Occupational History    Not on file   Tobacco Use    Smoking status: Never Smoker    Smokeless tobacco: Never Used   Substance and Sexual Activity    Alcohol use: Yes     Comment: occasionally    Drug use: No    Sexual activity: Not Currently   Other Topics Concern    Not on file   Social History Narrative    2 boys in [place]. Retired from [employer]. Social Determinants of Health     Financial Resource Strain:     Difficulty of Paying Living Expenses:    Food Insecurity:     Worried About Running Out of Food in the Last Year:     [address] in the Last Year:    Transportation Needs:     Lack of Transportation (Medical):      Lack of Transportation (Non-Medical):    Physical Activity:     Days of Exercise per Week:     Minutes of Exercise per Session:    Stress:     Feeling of Stress :    Social Connections:     taken for this visit. Physical Exam   Constitutional: He appears healthy. HENT:   Nose: Nose normal.   Mouth/Throat: Dentition is normal. Oropharynx is clear. Eyes: Pupils are equal, round, and reactive to light. Cardiovascular: Normal rate, regular rhythm, S1 normal, S2 normal, normal heart sounds, intact distal pulses and normal pulses. No extrasystoles are present. Exam reveals no gallop. No murmur heard. Pulmonary/Chest: Effort normal and breath sounds normal. He has no wheezes. He has no rales. He exhibits no tenderness. Abdominal: Soft. Bowel sounds are normal. He exhibits no distension and no mass. There is no splenomegaly or hepatomegaly. There is no abdominal tenderness. Musculoskeletal:         General: No tenderness, deformity or edema. Normal range of motion. Cervical back: Normal range of motion. Neurological: He is alert and oriented to person, place, and time. He has normal motor skills and normal reflexes. Gait normal.   Skin: Skin is warm and dry. Patient Active Problem List   Diagnosis    Hypertension    Benign prostatic hyperplasia    Cerebrovascular disease    ED (erectile dysfunction)    Sensorineural hearing loss, bilateral    Hyperlipidemia    CAD (coronary artery disease)    Other forms of angina pectoris (HCC)    Seborrheic keratoses    Chronic otitis externa    Chronic suppurative otitis media    Trigger thumb, left thumb    Hypogonadism in male    Perforation of right tympanic membrane    Osteoarthritis of both hips    Morbidly obese (HCC)                   Assessment/Orders:   Coronary artery disease  Totally occluded RCA with collaterals from the left  Hypertension  Dyslipidemia  BPH    Plan:      Stay on same medications.     See me in 6 months        Electronically signed by: Susannah Henry MD  11/1/2021 10:55 AM

## 2021-11-03 PROBLEM — I10 BENIGN ESSENTIAL HYPERTENSION: Status: ACTIVE | Noted: 2017-09-26

## 2021-11-26 NOTE — TELEPHONE ENCOUNTER
Requesting medication refill. Please approve or deny this request.    Rx requested:  Requested Prescriptions     Pending Prescriptions Disp Refills    simvastatin (ZOCOR) 20 MG tablet [Pharmacy Med Name: SIMVASTATIN TABS 20MG] 90 tablet 3     Sig: TAKE 1 TABLET NIGHTLY         Last Office Visit:   11/1/2021      Next Visit Date:  Future Appointments   Date Time Provider Hima Buenrostro   12/14/2021  9:15 AM Bayron Ball MD [facility]   1/27/2022  8:15 AM GIL Mcallister - CNP [facility] [place]   5/2/2022 11:30 AM Susannah Henry MD [address] [place] [address]               Last refill 12/1/20. Please approve or deny.

## 2021-11-29 RX ORDER — SIMVASTATIN 20 MG
TABLET ORAL
Qty: 90 TABLET | Refills: 3 | Status: SHIPPED | OUTPATIENT
Start: 2021-11-29 | End: 2022-04-29 | Stop reason: SDUPTHER

## 2021-12-14 ENCOUNTER — OFFICE VISIT (OUTPATIENT)
Dept: UROLOGY | Age: 76
End: 2021-12-14
Payer: MEDICARE

## 2021-12-14 VITALS
BODY MASS INDEX: 32.58 KG/M2 | SYSTOLIC BLOOD PRESSURE: 122 MMHG | DIASTOLIC BLOOD PRESSURE: 84 MMHG | HEART RATE: 72 BPM | HEIGHT: 69 IN | WEIGHT: 220 LBS

## 2021-12-14 DIAGNOSIS — N40.1 BPH WITH OBSTRUCTION/LOWER URINARY TRACT SYMPTOMS: Primary | ICD-10-CM

## 2021-12-14 DIAGNOSIS — N13.8 BPH WITH OBSTRUCTION/LOWER URINARY TRACT SYMPTOMS: Primary | ICD-10-CM

## 2021-12-14 PROCEDURE — 1123F ACP DISCUSS/DSCN MKR DOCD: CPT | Performed by: UROLOGY

## 2021-12-14 PROCEDURE — G8484 FLU IMMUNIZE NO ADMIN: HCPCS | Performed by: UROLOGY

## 2021-12-14 PROCEDURE — G8427 DOCREV CUR MEDS BY ELIG CLIN: HCPCS | Performed by: UROLOGY

## 2021-12-14 PROCEDURE — 1036F TOBACCO NON-USER: CPT | Performed by: UROLOGY

## 2021-12-14 PROCEDURE — 99213 OFFICE O/P EST LOW 20 MIN: CPT | Performed by: UROLOGY

## 2021-12-14 PROCEDURE — 51798 US URINE CAPACITY MEASURE: CPT | Performed by: UROLOGY

## 2021-12-14 PROCEDURE — G8417 CALC BMI ABV UP PARAM F/U: HCPCS | Performed by: UROLOGY

## 2021-12-14 PROCEDURE — 4040F PNEUMOC VAC/ADMIN/RCVD: CPT | Performed by: UROLOGY

## 2021-12-14 ASSESSMENT — ENCOUNTER SYMPTOMS
SHORTNESS OF BREATH: 0
ABDOMINAL PAIN: 0
ABDOMINAL DISTENTION: 0

## 2021-12-14 NOTE — PROGRESS NOTES
Subjective:      Patient ID: Evonne Clement is a 68 y.o. male    HPI  This is a 66 yo white male with h/o HTN, CAD, CVA (lt eye vision loss), kidney stones, BPH w/LUTs on Proscar and Flomax back in follow-up. Since last seen on 12/11/20, he has no new  complaints. He denies hematuria and has no dysuria or pain. He has stable and intermittent urgency and frequency but no UI. He has a good flow. He has no interval colic. He reports no SE from his BPH medications. I reviewed the interval PSA done in 4/21 today. He has no new medical or surgical problems.      Lt hydrocelectomy on 1/19/15     Past Medical History:   Diagnosis Date    BPH (benign prostatic hypertrophy)     CAD (coronary artery disease)     Cerebrovascular disease     Chronic otitis externa 2/11/2016    Chronic suppurative otitis media 11/11/2015    Ear injury 2006    [facility]    ED (erectile dysfunction) 11/28/2014    Hearing loss     History of nephrolithiasis     History of pneumonia 2004    History of prediabetes     Hyperlipidemia 10/10/2013    Hypertension     Hypogonadism in male     Mass of chest wall, left     Optic neuropathy, ischemic     Osteoarthritis of both hips     Paresthesias     Perforation of right tympanic membrane     chronic; Dr. Lashaun Garnica PSA (prostate specific antigen) 2007- 1.12    Seborrheic keratosis 10/10/2013    Sensorineural hearing loss, bilateral     Stable angina (HCC)     Trigger thumb, left thumb      Past Surgical History:   Procedure Laterality Date    CARPAL TUNNEL RELEASE Right 05/30/2017    CCRAVEN PEARL MD    CARPAL TUNNEL RELEASE Left 06/2017    CCRAVEN PEARL MD    COLONOSCOPY  6/2015    Dr. Linnea Ramirez      lower eyelids   [address] [place] [address]    LIPOMA RESECTION  3/22/16    DR. BARRAGAN    OTHER SURGICAL HISTORY Left 1/2015    hydrocelectomy    TYMPANOPLASTY      At the [address] [place] [address] History     Socioeconomic simvastatin (ZOCOR) 20 MG tablet TAKE 1 TABLET NIGHTLY 90 tablet 3    metoprolol succinate (TOPROL XL) 25 MG extended release tablet Take 1 tablet by mouth nightly 90 tablet 2    irbesartan (AVAPRO) 150 MG tablet TAKE 1 TABLET DAILY 90 tablet 3    finasteride (PROSCAR) 5 MG tablet TAKE 1 TABLET DAILY 90 tablet 3    tamsulosin (FLOMAX) 0.4 MG capsule Take 1 capsule by mouth daily 90 capsule 3    Naphazoline-Glycerin (CLEAR EYES COOLING COMFORT OP) Apply to eye      Ascorbic Acid (VITAMIN C) 500 MG tablet Take 500 mg by mouth daily      Multiple Vitamins-Minerals (MULTIVITAMIN PO) Take by mouth      aspirin 81 MG tablet Take 81 mg by mouth daily. No current facility-administered medications for this visit. Patient has no known allergies. reviewed      Review of Systems   Constitutional: Negative for unexpected weight change. Respiratory: Negative for shortness of breath. Cardiovascular: Negative for chest pain. Gastrointestinal: Negative for abdominal distention and abdominal pain. Genitourinary: Negative for difficulty urinating, dysuria, enuresis, flank pain and hematuria. Objective:   Physical Exam  Constitutional:       Appearance: Normal appearance. Abdominal:      General: Abdomen is protuberant. Palpations: Abdomen is soft. Genitourinary:     Comments: Pt deferred  Neurological:      Mental Status: He is alert. PSA   Date Value Ref Range Status   04/02/2021 1.35 0.00 - 6.22 ng/mL Final     Comment:     When the Total PSA is between 3.00 and 10.00 ng/mL, consider  requesting a Free PSA to aid in diagnosis.      12/09/2020 1.51 0.00 - 6.22 ng/mL Final   12/09/2019 1.27 0.00 - 6.22 ng/mL Final   12/10/2018 1.42 0.00 - 6.22 ng/mL Final   11/15/2017 1.75 0.00 - 6.22 ng/mL Final     Diagnostic Psa   Date Value Ref Range Status   05/05/2014 1.41 0.00 - 5.40 ng/mL Final   04/24/2013 1.3 0.0 - 4.0 ng/mL Final       post void residual by U/S: 100 cc (voided prior)  Assessment: This is a 64 yo white male with h/o HTN, CAD, CVA (lt eye vision loss), kidney stones, BPH w/LUTs on Proscar and Flomax (stable) with stable bladder emptying. He has a normal and stable PSA even corrected for use of 5 LINDA and he wants to continue with yearly PSA testing.       Plan:      1.  F/U 1 yr for PSA and Flow/PVR        Bayron Ball MD

## 2022-01-11 ENCOUNTER — TELEPHONE (OUTPATIENT)
Dept: FAMILY MEDICINE CLINIC | Age: 77
End: 2022-01-11

## 2022-02-02 ENCOUNTER — OFFICE VISIT (OUTPATIENT)
Dept: FAMILY MEDICINE CLINIC | Age: 77
End: 2022-02-02
Payer: MEDICARE

## 2022-02-02 VITALS
TEMPERATURE: 97.6 F | HEART RATE: 77 BPM | HEIGHT: 69 IN | SYSTOLIC BLOOD PRESSURE: 120 MMHG | WEIGHT: 228 LBS | DIASTOLIC BLOOD PRESSURE: 82 MMHG | OXYGEN SATURATION: 97 % | BODY MASS INDEX: 33.77 KG/M2

## 2022-02-02 DIAGNOSIS — R39.14 BENIGN PROSTATIC HYPERPLASIA WITH INCOMPLETE BLADDER EMPTYING: ICD-10-CM

## 2022-02-02 DIAGNOSIS — Z00.00 ROUTINE GENERAL MEDICAL EXAMINATION AT A HEALTH CARE FACILITY: Primary | ICD-10-CM

## 2022-02-02 DIAGNOSIS — I20.8 OTHER FORMS OF ANGINA PECTORIS (HCC): ICD-10-CM

## 2022-02-02 DIAGNOSIS — N40.1 BENIGN PROSTATIC HYPERPLASIA WITH INCOMPLETE BLADDER EMPTYING: ICD-10-CM

## 2022-02-02 PROCEDURE — 4040F PNEUMOC VAC/ADMIN/RCVD: CPT | Performed by: NURSE PRACTITIONER

## 2022-02-02 PROCEDURE — G0439 PPPS, SUBSEQ VISIT: HCPCS | Performed by: NURSE PRACTITIONER

## 2022-02-02 PROCEDURE — G8484 FLU IMMUNIZE NO ADMIN: HCPCS | Performed by: NURSE PRACTITIONER

## 2022-02-02 PROCEDURE — 1123F ACP DISCUSS/DSCN MKR DOCD: CPT | Performed by: NURSE PRACTITIONER

## 2022-02-02 SDOH — ECONOMIC STABILITY: FOOD INSECURITY: WITHIN THE PAST 12 MONTHS, YOU WORRIED THAT YOUR FOOD WOULD RUN OUT BEFORE YOU GOT MONEY TO BUY MORE.: NEVER TRUE

## 2022-02-02 SDOH — ECONOMIC STABILITY: TRANSPORTATION INSECURITY
IN THE PAST 12 MONTHS, HAS LACK OF TRANSPORTATION KEPT YOU FROM MEETINGS, WORK, OR FROM GETTING THINGS NEEDED FOR DAILY LIVING?: NO

## 2022-02-02 SDOH — ECONOMIC STABILITY: TRANSPORTATION INSECURITY
IN THE PAST 12 MONTHS, HAS THE LACK OF TRANSPORTATION KEPT YOU FROM MEDICAL APPOINTMENTS OR FROM GETTING MEDICATIONS?: NO

## 2022-02-02 SDOH — ECONOMIC STABILITY: FOOD INSECURITY: WITHIN THE PAST 12 MONTHS, THE FOOD YOU BOUGHT JUST DIDN'T LAST AND YOU DIDN'T HAVE MONEY TO GET MORE.: NEVER TRUE

## 2022-02-02 ASSESSMENT — PATIENT HEALTH QUESTIONNAIRE - PHQ9
SUM OF ALL RESPONSES TO PHQ9 QUESTIONS 1 & 2: 0
SUM OF ALL RESPONSES TO PHQ QUESTIONS 1-9: 0
2. FEELING DOWN, DEPRESSED OR HOPELESS: 0
SUM OF ALL RESPONSES TO PHQ QUESTIONS 1-9: 0
1. LITTLE INTEREST OR PLEASURE IN DOING THINGS: 0
SUM OF ALL RESPONSES TO PHQ QUESTIONS 1-9: 0
SUM OF ALL RESPONSES TO PHQ QUESTIONS 1-9: 0

## 2022-02-02 ASSESSMENT — SOCIAL DETERMINANTS OF HEALTH (SDOH): HOW HARD IS IT FOR YOU TO PAY FOR THE VERY BASICS LIKE FOOD, HOUSING, MEDICAL CARE, AND HEATING?: NOT HARD AT ALL

## 2022-02-02 NOTE — PATIENT INSTRUCTIONS
Personalized Preventive Plan for Geneva Silva - 2/2/2022  Medicare offers a range of preventive health benefits. Some of the tests and screenings are paid in full while other may be subject to a deductible, co-insurance, and/or copay. Some of these benefits include a comprehensive review of your medical history including lifestyle, illnesses that may run in your family, and various assessments and screenings as appropriate. After reviewing your medical record and screening and assessments performed today your provider may have ordered immunizations, labs, imaging, and/or referrals for you. A list of these orders (if applicable) as well as your Preventive Care list are included within your After Visit Summary for your review. Other Preventive Recommendations:    · A preventive eye exam performed by an eye specialist is recommended every 1-2 years to screen for glaucoma; cataracts, macular degeneration, and other eye disorders. · A preventive dental visit is recommended every 6 months. · Try to get at least 150 minutes of exercise per week or 10,000 steps per day on a pedometer . · Order or download the FREE \"Exercise & Physical Activity: Your Everyday Guide\" from [employer]. Call 6-904.380.7864 or search [employer] online. · You need 9305-2152 mg of calcium and 3212-5199 IU of vitamin D per day. It is possible to meet your calcium requirement with diet alone, but a vitamin D supplement is usually necessary to meet this goal.  · When exposed to the sun, use a sunscreen that protects against both UVA and UVB radiation with an SPF of 30 or greater. Reapply every 2 to 3 hours or after sweating, drying off with a towel, or swimming. · Always wear a seat belt when traveling in a car. Always wear a helmet when riding a bicycle or motorcycle.

## 2022-02-02 NOTE — PROGRESS NOTES
Medicare Annual Wellness Visit  Name: Nella Ryan Date: 2022   MRN: [id number] Sex: Male   Age: 68 y.o. Ethnicity: Non- / Non    : 1945 Race: White (non-)      Norah Ramsey is here for Medicare AWV    Screenings for behavioral, psychosocial and functional/safety risks, and cognitive dysfunction are all negative except as indicated below. These results, as well as other patient data from the [address] [facility] form, are documented in Flowsheets linked to this Encounter. No Known Allergies      Prior to Visit Medications    Medication Sig Taking? Authorizing Provider   simvastatin (ZOCOR) 20 MG tablet TAKE 1 TABLET NIGHTLY Yes Robbin Taylor MD   metoprolol succinate (TOPROL XL) 25 MG extended release tablet Take 1 tablet by mouth nightly Yes Stephan Louis DO   irbesartan (AVAPRO) 150 MG tablet TAKE 1 TABLET DAILY Yes GIL Reeder CNP   finasteride (PROSCAR) 5 MG tablet TAKE 1 TABLET DAILY Yes Chon Bustos MD   tamsulosin (FLOMAX) 0.4 MG capsule Take 1 capsule by mouth daily Yes Chon Bustos MD   Naphazoline-Glycerin (CLEAR EYES COOLING COMFORT OP) Apply to eye Yes Historical Provider, MD   Ascorbic Acid (VITAMIN C) 500 MG tablet Take 500 mg by mouth daily Yes Historical Provider, MD   Multiple Vitamins-Minerals (MULTIVITAMIN PO) Take by mouth Yes Historical Provider, MD   aspirin 81 MG tablet Take 81 mg by mouth daily.  Yes Historical Provider, MD         Past Medical History:   Diagnosis Date    BPH (benign prostatic hypertrophy)     CAD (coronary artery disease)     Cerebrovascular disease     Chronic otitis externa 2016    Chronic suppurative otitis media 2015    Ear injury 2006    [facility]    ED (erectile dysfunction) 2014    Hearing loss     History of nephrolithiasis     History of pneumonia 2004    History of prediabetes     Hyperlipidemia 10/10/2013    Hypertension     Hypogonadism in male     Mass of chest wall, left     Optic neuropathy, ischemic     Osteoarthritis of both hips     Paresthesias     Perforation of right tympanic membrane     chronic; Dr. Mauricio Bone Screening PSA (prostate specific antigen) 2007- 1.12    Seborrheic keratosis 10/10/2013    Sensorineural hearing loss, bilateral     Stable angina (HCC)     Trigger thumb, left thumb        Past Surgical History:   Procedure Laterality Date    CARPAL TUNNEL RELEASE Right 05/30/2017    CCRAVEN PEARL MD    CARPAL TUNNEL RELEASE Left 06/2017    CCRAVEN PEARL MD    COLONOSCOPY  6/2015    Dr. Mundo Anderson      lower eyelids   [address] RESECTION  3/22/16    DR. BARRAGAN    OTHER SURGICAL HISTORY Left 1/2015    hydrocelectomy    TYMPANOPLASTY      [facility]         Family History   Problem Relation Age of Onset    Stroke Father        CareTeam (Including outside providers/suppliers regularly involved in providing care):   Patient Care Team:  GIL Beaver CNP as PCP - General (Family Nurse Practitioner)  GIL Beaver CNP as PCP - [facility] Empaneled Provider  Tia Burgos MD (Urology)  Juliette Francis MD (Family Medicine)  Elisha Galindo MD (General Surgery)    Wt Readings from Last 3 Encounters:   02/02/22 228 lb (103.4 kg)   12/14/21 220 lb (99.8 kg)   11/01/21 230 lb (104.3 kg)     Vitals:    02/02/22 0837   BP: 120/82   Pulse: 77   Temp: 97.6 °F (36.4 °C)   SpO2: 97%   Weight: 228 lb (103.4 kg)   Height: 5' 8.5\" (1.74 m)     Body mass index is 34.16 kg/m². Based upon direct observation of the patient, evaluation of cognition reveals recent and remote memory intact. Patient's complete Health Risk Assessment and screening values have been reviewed and are found in Flowsheets. The following problems were reviewed today and where indicated follow up appointments were made and/or referrals ordered.       Positive Risk Factor Screenings with Interventions:              General Health and ACP:  General  In general, how would you say your health is?: Good  In the past 7 days, have you experienced any of the following?  New or Increased Pain, New or Increased Fatigue, Loneliness, Social Isolation, Stress or Anger?: None of These  Do you get the social and emotional support that you need?: Yes  Do you have a Living Will?: Yes  Advance Directives     Power of Jamison Hernadez Clarissa Will ACP-Advance Directive ACP-Power of     Not on File Not on File Not on File Not on File      General Health Risk Interventions:  · will drop off a copy    Health Habits/Nutrition:  Health Habits/Nutrition  Do you exercise for at least 20 minutes 2-3 times per week?: (!) No  Have you lost any weight without trying in the past 3 months?: No  Do you eat only one meal per day?: No  Have you seen the dentist within the past year?: Yes  Body mass index: (!) 34.16  Health Habits/Nutrition Interventions:  · Inadequate physical activity:  has elliptical in basement     Hearing/Vision:  No exam data present  Hearing/Vision  Do you or your family notice any trouble with your hearing that hasn't been managed with hearing aids?: (!) Yes  Do you have difficulty driving, watching TV, or doing any of your daily activities because of your eyesight?: (!) Yes  Have you had an eye exam within the past year?: Yes  Hearing/Vision Interventions:  · Hearing concerns:  has hearing aides and wears them  · Vision concerns:  patient declines any further evaluation/treatment for this issue    Safety:  Safety  Do you have working smoke detectors?: Yes  Have all throw rugs been removed or fastened?: Yes  Do you have non-slip mats or surfaces in all bathtubs/showers?: Yes  Do all of your stairways have a railing or banister?: (!) No  Are your doorways, halls and stairs free of clutter?: Yes  Do you always fasten your seatbelt when you are in a car?: Yes  Safety Interventions:  · Home safety tips provided    ADL:  ADLs  In the past 7 days, did you need help from others to perform any of the following everyday activities? Eating, dressing, grooming, bathing, toileting, or walking/balance?: None  In the past 7 days, did you need help from others to take care of any of the following?  Laundry, housekeeping, banking/finances, shopping, telephone use, food preparation, transportation, or taking medications?: (!) Housekeeping  ADL Interventions:  · Patient declines any further evaluation/treatment for this issue      Personalized Preventive Plan   Current Health Maintenance Status  Immunization History   Administered Date(s) Administered    COVID-19, Pfizer Purple top, DILUTE for use, 12+ yrs, 30mcg/0.3mL dose 01/22/2021    Influenza A ([id number]) Vaccine PF IM 11/21/2009    Influenza Vaccine, unspecified formulation 11/08/2007, 09/28/2011, 11/10/2016    Influenza Virus Vaccine 09/27/2012, 10/08/2013, 09/28/2014    Influenza Whole 09/28/2014    Influenza, High Dose (Fluzone 65 yrs and older) 11/10/2016, 09/19/2017, 09/11/2018    Influenza, Quadv, IM, PF (6 mo and older Fluzone, Flulaval, Fluarix, and 3 yrs and older Afluria) 10/08/2013    Influenza, Quadv, adjuvanted, 65 yrs +, IM, PF (Fluad) 10/01/2020    Pneumococcal Conjugate 13-valent ([id number]) 12/03/2015    Pneumococcal Polysaccharide ([id number]) 03/14/2011    Pneumococcal Vaccine 03/14/2011    Td (Adult), 2 Lf Tetanus Toxoid, Pf (Td, Absorbed) 09/26/2017    Td, unspecified formulation 03/14/2011    Tdap (Boostrix, Adacel) 03/12/2012    Zoster Recombinant (Shingrix) 06/20/2019, 09/26/2019        Health Maintenance   Topic Date Due    Annual Wellness Visit (AWV)  01/27/2022    Lipid screen  04/02/2022    Potassium monitoring  04/02/2022    Creatinine monitoring  04/02/2022    Depression Screen  02/02/2023    DTaP/Tdap/Td vaccine (3 - Td or Tdap) 09/26/2027    Flu vaccine  Completed    Shingles Vaccine  Completed    Pneumococcal 65+ years Vaccine  Completed    COVID-19 Vaccine  Completed    Hepatitis C screen  Completed    Hepatitis A vaccine  Aged Out    Hepatitis B vaccine  Aged Out    Hib vaccine  Aged Out    Meningococcal (ACWY) vaccine  Aged Out     Recommendations for [employer] (QEEP) Due: see orders and patient instructions/AVS.  . Recommended screening schedule for the next 5-10 years is provided to the patient in written form: see Patient Instructions/AVS.    Francisco Raman was seen today for medicare awv. Diagnoses and all orders for this visit:    Routine general medical examination at a health care facility    Other forms of angina pectoris ([address]  - no recent episodes    Benign prostatic hyperplasia with incomplete bladder emptying  - follows with urology. Taking meds. Discussed trying to \"double void\"          FU in 6 mos.

## 2022-03-17 ENCOUNTER — TELEPHONE (OUTPATIENT)
Dept: FAMILY MEDICINE CLINIC | Age: 77
End: 2022-03-17

## 2022-04-11 DIAGNOSIS — N13.8 BPH WITH OBSTRUCTION/LOWER URINARY TRACT SYMPTOMS: ICD-10-CM

## 2022-04-11 DIAGNOSIS — N40.1 BPH WITH OBSTRUCTION/LOWER URINARY TRACT SYMPTOMS: ICD-10-CM

## 2022-04-11 RX ORDER — FINASTERIDE 5 MG/1
TABLET, FILM COATED ORAL
Qty: 90 TABLET | Refills: 3 | Status: SHIPPED | OUTPATIENT
Start: 2022-04-11

## 2022-04-29 ENCOUNTER — OFFICE VISIT (OUTPATIENT)
Dept: CARDIOLOGY CLINIC | Age: 77
End: 2022-04-29
Payer: MEDICARE

## 2022-04-29 VITALS
DIASTOLIC BLOOD PRESSURE: 66 MMHG | BODY MASS INDEX: 33.62 KG/M2 | WEIGHT: 227 LBS | SYSTOLIC BLOOD PRESSURE: 124 MMHG | HEIGHT: 69 IN | OXYGEN SATURATION: 95 % | HEART RATE: 81 BPM

## 2022-04-29 DIAGNOSIS — I10 ESSENTIAL HYPERTENSION: ICD-10-CM

## 2022-04-29 DIAGNOSIS — E66.01 MORBIDLY OBESE (HCC): ICD-10-CM

## 2022-04-29 DIAGNOSIS — I10 PRIMARY HYPERTENSION: ICD-10-CM

## 2022-04-29 DIAGNOSIS — I25.10 CORONARY ARTERY DISEASE INVOLVING NATIVE CORONARY ARTERY OF NATIVE HEART WITHOUT ANGINA PECTORIS: Primary | ICD-10-CM

## 2022-04-29 DIAGNOSIS — I10 BENIGN ESSENTIAL HYPERTENSION: ICD-10-CM

## 2022-04-29 PROCEDURE — 1036F TOBACCO NON-USER: CPT | Performed by: INTERNAL MEDICINE

## 2022-04-29 PROCEDURE — 4040F PNEUMOC VAC/ADMIN/RCVD: CPT | Performed by: INTERNAL MEDICINE

## 2022-04-29 PROCEDURE — G8428 CUR MEDS NOT DOCUMENT: HCPCS | Performed by: INTERNAL MEDICINE

## 2022-04-29 PROCEDURE — 99214 OFFICE O/P EST MOD 30 MIN: CPT | Performed by: INTERNAL MEDICINE

## 2022-04-29 PROCEDURE — G8417 CALC BMI ABV UP PARAM F/U: HCPCS | Performed by: INTERNAL MEDICINE

## 2022-04-29 PROCEDURE — 1123F ACP DISCUSS/DSCN MKR DOCD: CPT | Performed by: INTERNAL MEDICINE

## 2022-04-29 RX ORDER — IRBESARTAN 150 MG/1
TABLET ORAL
Qty: 90 TABLET | Refills: 3 | Status: SHIPPED | OUTPATIENT
Start: 2022-04-29

## 2022-04-29 RX ORDER — METOPROLOL SUCCINATE 25 MG/1
25 TABLET, EXTENDED RELEASE ORAL NIGHTLY
Qty: 90 TABLET | Refills: 3 | Status: SHIPPED | OUTPATIENT
Start: 2022-04-29

## 2022-04-29 RX ORDER — SIMVASTATIN 20 MG
TABLET ORAL
Qty: 90 TABLET | Refills: 3 | Status: SHIPPED | OUTPATIENT
Start: 2022-04-29

## 2022-04-29 NOTE — PROGRESS NOTES
Chief Complaint   Patient presents with   Aditi Carmona Doctor     prev dr Molina Hamilton pt     Hypertension       4/29/2022: Patient presents for initial medical evaluation. Patient is followed on a regular basis by Dr. Hector Butler, APRN - CNP. Patient with history of hypertension, hyperlipidemia. Blood pressure today is 124/66. S/p normal nuclear stress test in 2020. No hx of echo. Pt denies chest pain, dyspnea, dyspnea on exertion, change in exercise capacity, fatigue,  nausea, vomiting, diarrhea, constipation, motor weakness, insomnia, weight loss, syncope, dizziness, lightheadedness, palpitations, PND, orthopnea, or claudication. History of cardiac catheterization 2015 and noted to have a right coronary artery chronic total occlusion with retrograde filling otherwise mild diffuse disease. Patient Active Problem List   Diagnosis    Hypertension    Benign prostatic hyperplasia with incomplete bladder emptying    Cerebrovascular disease    ED (erectile dysfunction)    Sensorineural hearing loss, bilateral    Hyperlipidemia    CAD (coronary artery disease)    Other forms of angina pectoris (HCC)    Seborrheic keratoses    Chronic otitis externa    Chronic suppurative otitis media    Trigger thumb, left thumb    Hypogonadism in male    Perforation of right tympanic membrane    Osteoarthritis of both hips    Morbidly obese ([address]    Benign essential hypertension       Past Surgical History:   Procedure Laterality Date    CARPAL TUNNEL RELEASE Right 05/30/2017    CCRAVEN PEARL MD    CARPAL TUNNEL RELEASE Left 06/2017    CCRAVEN PEARL MD    COLONOSCOPY  6/2015    Dr. Blima Dubin      lower eyelids   [address] [place] [address]    LIPOMA RESECTION  3/22/16    DR. BARRAGAN    OTHER SURGICAL HISTORY Left 1/2015    hydrocelectomy    TYMPANOPLASTY      At [facility] History     Socioeconomic History    Marital status:       Spouse name: None    Number of children: 2    Years of education: None    Highest education level: None   Occupational History    None   Tobacco Use    Smoking status: Never Smoker    Smokeless tobacco: Never Used   Substance and Sexual Activity    Alcohol use: Yes     Comment: occasionally    Drug use: No    Sexual activity: Not Currently   Other Topics Concern    None   Social History Narrative    2 boys in [place]. Retired from [employer]. Social Determinants of Health     Financial Resource Strain: Low Risk     Difficulty of Paying Living Expenses: Not hard at all   Food Insecurity: No Food Insecurity    Worried About Running Out of Food in the Last Year: Never true    Chas of Food in the Last Year: Never true   Transportation Needs: No Transportation Needs    Lack of Transportation (Medical): No    Lack of Transportation (Non-Medical):  No   Physical Activity:     Days of Exercise per Week: Not on file    Minutes of Exercise per Session: Not on file   Stress:     Feeling of Stress : Not on file   Social Connections:     Frequency of Communication with Friends and Family: Not on file    Frequency of Social Gatherings with Friends and Family: Not on file    Attends Adventism Services: Not on file    Active Member of [address] [place] [address] [employer] Read Books or Organizations: Not on file    Attends Club or Organization Meetings: Not on file    Marital Status: Not on file   Intimate Partner Violence:     Fear of Current or Ex-Partner: Not on file    Emotionally Abused: Not on file    Physically Abused: Not on file    Sexually Abused: Not on file   Housing Stability:     Unable to Pay for Housing in the Last Year: Not on file    Number of [place] in the Last Year: Not on file    Unstable Housing in the Last Year: Not on file       Family History   Problem Relation Age of Onset    Stroke Father        Current Outpatient Medications   Medication Sig Dispense Refill    simvastatin (ZOCOR) 20 MG tablet TAKE 1 TABLET NIGHTLY 90 tablet 3    metoprolol succinate (TOPROL XL) 25 MG extended release tablet Take 1 tablet by mouth nightly 90 tablet 3    irbesartan (AVAPRO) 150 MG tablet TAKE 1 TABLET DAILY 90 tablet 3    finasteride (PROSCAR) 5 MG tablet TAKE 1 TABLET DAILY 90 tablet 3    Naphazoline-Glycerin (CLEAR EYES COOLING COMFORT OP) Apply to eye      Ascorbic Acid (VITAMIN C) 500 MG tablet Take 500 mg by mouth daily      Multiple Vitamins-Minerals (MULTIVITAMIN PO) Take by mouth      aspirin 81 MG tablet Take 81 mg by mouth daily.  tamsulosin (FLOMAX) 0.4 MG capsule Take 1 capsule by mouth daily 90 capsule 3     No current facility-administered medications for this visit. Patient has no known allergies. Review of Systems:  General ROS: negative  Psychological ROS: negative  Hematological and Lymphatic ROS: No history of blood clots or bleeding disorder. Respiratory ROS: no cough, shortness of breath, or wheezing  Cardiovascular ROS: negative  Gastrointestinal ROS: negative  Genito-Urinary ROS: no dysuria, trouble voiding, or hematuria  Musculoskeletal ROS: negative  Neurological ROS: no TIA or stroke symptoms  Dermatological ROS: negative    VITALS:  Blood pressure 124/66, pulse 81, height 5' 8.5\" (1.74 m), weight 227 lb (103 kg), SpO2 95 %. Body mass index is 34.01 kg/m². Physical Examination:  General appearance - alert, well appearing, and in no distress  Mental status - alert, oriented to person, place, and time  Neck - Neck is supple, no JVD or carotid bruits. No thyromegaly or adenopathy.    Chest - clear to auscultation, no wheezes, rales or rhonchi, symmetric air entry  Heart - normal rate, regular rhythm, normal S1, S2, no murmurs, rubs, clicks or gallops  Abdomen - soft, nontender, nondistended, no masses or organomegaly  Neurological - alert, oriented, normal speech, no focal findings or movement disorder noted  Extremities - peripheral pulses normal, no pedal edema, no clubbing or cyanosis  Skin - normal coloration and turgor, no rashes, no suspicious skin lesions noted      EKG:     No orders of the defined types were placed in this encounter. ASSESSMENT:     Diagnosis Orders   1. Coronary artery disease involving native coronary artery of native heart without angina pectoris     2. Essential hypertension  metoprolol succinate (TOPROL XL) 25 MG extended release tablet    irbesartan (AVAPRO) 150 MG tablet   3. Benign essential hypertension     4. Primary hypertension     5. Morbidly obese ([address]           PLAN:       As always, aggressive risk factor modification is strongly recommended. We should adhere to the JNC VIII guidelines for HTN management and the NCEP ATP III guidelines for LDL-C management. Cardiac diet is always recommended with low fat, cholesterol, calories and sodium. Continue medications at current doses. Weight loss discussed. Consider echocardiogram in the future if symptomatic. Patient was advised and encouraged to check blood pressure at home or at a pharmacy, maintain a logbook, and also call us back if blood pressure are above the target ranges or if it is low. Patient clearly understands and agrees to the instructions. We will need to continue to monitor muscle and liver enzymes, BUN, CR, and electrolytes.

## 2022-06-21 ENCOUNTER — OFFICE VISIT (OUTPATIENT)
Dept: FAMILY MEDICINE CLINIC | Age: 77
End: 2022-06-21
Payer: MEDICARE

## 2022-06-21 VITALS
SYSTOLIC BLOOD PRESSURE: 116 MMHG | WEIGHT: 227 LBS | DIASTOLIC BLOOD PRESSURE: 82 MMHG | BODY MASS INDEX: 33.62 KG/M2 | HEIGHT: 69 IN | HEART RATE: 74 BPM | OXYGEN SATURATION: 97 %

## 2022-06-21 DIAGNOSIS — E78.2 MIXED HYPERLIPIDEMIA: ICD-10-CM

## 2022-06-21 DIAGNOSIS — M54.2 NECK PAIN: Primary | ICD-10-CM

## 2022-06-21 PROCEDURE — G8417 CALC BMI ABV UP PARAM F/U: HCPCS | Performed by: NURSE PRACTITIONER

## 2022-06-21 PROCEDURE — 1036F TOBACCO NON-USER: CPT | Performed by: NURSE PRACTITIONER

## 2022-06-21 PROCEDURE — 99214 OFFICE O/P EST MOD 30 MIN: CPT | Performed by: NURSE PRACTITIONER

## 2022-06-21 PROCEDURE — G8427 DOCREV CUR MEDS BY ELIG CLIN: HCPCS | Performed by: NURSE PRACTITIONER

## 2022-06-21 PROCEDURE — 1123F ACP DISCUSS/DSCN MKR DOCD: CPT | Performed by: NURSE PRACTITIONER

## 2022-06-21 RX ORDER — PREDNISOLONE ACETATE 10 MG/ML
SUSPENSION/ DROPS OPHTHALMIC
COMMUNITY
Start: 2022-06-02

## 2022-06-21 RX ORDER — MOXIFLOXACIN 5 MG/ML
SOLUTION/ DROPS OPHTHALMIC
COMMUNITY
Start: 2022-06-02

## 2022-06-21 RX ORDER — TIZANIDINE 2 MG/1
2 TABLET ORAL NIGHTLY PRN
Qty: 14 TABLET | Refills: 0 | Status: SHIPPED | OUTPATIENT
Start: 2022-06-21

## 2022-06-21 RX ORDER — METHYLPREDNISOLONE 4 MG/1
TABLET ORAL
Qty: 21 TABLET | Refills: 0 | Status: SHIPPED | OUTPATIENT
Start: 2022-06-21 | End: 2022-06-27

## 2022-06-21 RX ORDER — BROMFENAC SODIUM 0.7 MG/ML
SOLUTION/ DROPS OPHTHALMIC
COMMUNITY
Start: 2022-06-02

## 2022-06-21 ASSESSMENT — ENCOUNTER SYMPTOMS
SHORTNESS OF BREATH: 0
COUGH: 0

## 2022-06-21 NOTE — PROGRESS NOTES
Subjective  Chief Complaint   Patient presents with    Neck Pain     pt states neck pain that has been progressing day by day, started with left shoulder pain. pt states he has taken aleve for the pain but has not fully taken pain away. HPI    Having neck pain and shoulder pain and it has been worse past two weeks. Has gone to the chiropractor. Helped some. Taking aleve. Helps slightly. No left arm pain, tingling weakness. Hurts to move neck. Has flared previously  States that prednisone helped before. Previous xrays from 2019 reviewed. These showed significant arthritic changes. Pt also due for bw.     HTN has been well controlled    Patient Active Problem List    Diagnosis Date Noted    Morbidly obese ([facility] [address] 07/28/2020    Osteoarthritis of both hips     Perforation of right tympanic membrane     Benign essential hypertension 09/26/2017    Trigger thumb, left thumb     Hypogonadism in male     Chronic otitis externa 02/11/2016    CAD (coronary artery disease)     Other forms of angina pectoris (HCC)     Seborrheic keratoses     Sensorineural hearing loss, bilateral     Chronic suppurative otitis media 11/11/2015    ED (erectile dysfunction) 11/28/2014    Hyperlipidemia 10/10/2013    Hypertension     Benign prostatic hyperplasia with incomplete bladder emptying     Cerebrovascular disease      Past Medical History:   Diagnosis Date    BPH (benign prostatic hypertrophy)     CAD (coronary artery disease)     Cerebrovascular disease     Chronic otitis externa 2/11/2016    Chronic suppurative otitis media 11/11/2015    Ear injury 2006    [address]    ED (erectile dysfunction) 11/28/2014    Hearing loss     History of nephrolithiasis     History of pneumonia 2004    History of prediabetes     Hyperlipidemia 10/10/2013    Hypertension     Hypogonadism in male     Mass of chest wall, left     Optic neuropathy, ischemic     Osteoarthritis of both hips     Paresthesias     Perforation of right tympanic membrane     chronic; Dr. Linda Thompson PSA (prostate specific antigen) 2007- 1.12    Seborrheic keratosis 10/10/2013    Sensorineural hearing loss, bilateral     Stable angina (HCC)     Trigger thumb, left thumb      Past Surgical History:   Procedure Laterality Date    CARPAL TUNNEL RELEASE Right 05/30/2017    ESME PEARL MD    CARPAL TUNNEL RELEASE Left 06/2017    ESME PEARL MD    COLONOSCOPY  6/2015    Dr. Maame Ardon      lower eyelids   [facility]    LIPOMA RESECTION  3/22/16    DR. BARRAGAN    OTHER SURGICAL HISTORY Left 1/2015    hydrocelectomy    [facility]LASTY      At [facility]     Family History   Problem Relation Age of Onset    Stroke Father      Social History     Socioeconomic History    Marital status:      Spouse name: None    Number of children: 2    Years of education: None    Highest education level: None   Occupational History    None   Tobacco Use    Smoking status: Never Smoker    Smokeless tobacco: Never Used   Substance and Sexual Activity    Alcohol use: Yes     Comment: occasionally    Drug use: No    Sexual activity: Not Currently   Other Topics Concern    None   Social History Narrative    2 boys in [place]. Retired from [employer]. Social Determinants of Health     Financial Resource Strain: Low Risk     Difficulty of Paying Living Expenses: Not hard at all   Food Insecurity: No Food Insecurity    Worried About Running Out of Food in the Last Year: Never true    Chas of Food in the Last Year: Never true   Transportation Needs: No Transportation Needs    Lack of Transportation (Medical): No    Lack of Transportation (Non-Medical):  No   Physical Activity:     Days of Exercise per Week: Not on file    Minutes of Exercise per Session: Not on file   Stress:     Feeling of Stress : Not on file   Social Connections:     Frequency of Communication with Friends and Family: Not on file    Frequency of Social Gatherings with Friends and Family: Not on file    Attends Holiness Services: Not on file    Active Member of Clubs or Organizations: Not on file    Attends Club or Organization Meetings: Not on file    Marital Status: Not on file   Intimate Partner Violence:     Fear of Current or Ex-Partner: Not on file    Emotionally Abused: Not on file    Physically Abused: Not on file    Sexually Abused: Not on file   Housing Stability:     Unable to Pay for Housing in the Last Year: Not on file    Number of [place] in the Last Year: Not on file    Unstable Housing in the Last Year: Not on file     Current Outpatient Medications on File Prior to Visit   Medication Sig Dispense Refill    PROLENSA 0.07 % SOLN       moxifloxacin (VIGAMOX) 0.5 % ophthalmic solution       prednisoLONE acetate (PRED FORTE) 1 % ophthalmic suspension       simvastatin (ZOCOR) 20 MG tablet TAKE 1 TABLET NIGHTLY 90 tablet 3    metoprolol succinate (TOPROL XL) 25 MG extended release tablet Take 1 tablet by mouth nightly 90 tablet 3    irbesartan (AVAPRO) 150 MG tablet TAKE 1 TABLET DAILY 90 tablet 3    finasteride (PROSCAR) 5 MG tablet TAKE 1 TABLET DAILY 90 tablet 3    tamsulosin (FLOMAX) 0.4 MG capsule Take 1 capsule by mouth daily 90 capsule 3    Naphazoline-Glycerin (CLEAR EYES COOLING COMFORT OP) Apply to eye      Ascorbic Acid (VITAMIN C) 500 MG tablet Take 500 mg by mouth daily      Multiple Vitamins-Minerals (MULTIVITAMIN PO) Take by mouth      aspirin 81 MG tablet Take 81 mg by mouth daily. No current facility-administered medications on file prior to visit. No Known Allergies    Review of Systems   Constitutional: Negative for fatigue. Respiratory: Negative for cough and shortness of breath. Cardiovascular: Negative for chest pain. Musculoskeletal: Positive for neck pain. Neurological: Negative for weakness and numbness. Objective  Vitals:    06/21/22 0800   BP: 116/82   Pulse: 74   SpO2: 97%   Weight: 227 lb (103 kg)   Height: 5' 8.5\" (1.74 m)     Physical Exam  Vitals and nursing note reviewed. Constitutional:       Appearance: Normal appearance. HENT:      Head: Normocephalic. Mouth/Throat:      Mouth: Mucous membranes are moist.   Eyes:      Extraocular Movements: Extraocular movements intact. Conjunctiva/sclera: Conjunctivae normal.      Pupils: Pupils are equal, round, and reactive to light. Cardiovascular:      Rate and Rhythm: Normal rate and regular rhythm. Pulses: Normal pulses. Heart sounds: Normal heart sounds. Pulmonary:      Effort: Pulmonary effort is normal.      Breath sounds: Normal breath sounds. Musculoskeletal:      Cervical back: Spasms, torticollis and tenderness present. Pain with movement present. Decreased range of motion. Neurological:      General: No focal deficit present. Mental Status: He is alert and oriented to person, place, and time. Mental status is at baseline. Psychiatric:         Mood and Affect: Mood normal.         Behavior: Behavior normal.         Thought Content: Thought content normal.         Judgment: Judgment normal.         Assessment & Plan     Diagnosis Orders   1. Neck pain  methylPREDNISolone (MEDROL DOSEPACK) 4 MG tablet    tiZANidine (ZANAFLEX) 2 MG tablet   2. Mixed hyperlipidemia  Lipid Panel    Comprehensive Metabolic Panel       Orders Placed This Encounter   Procedures    Lipid Panel     Standing Status:   Future     Standing Expiration Date:   6/21/2023     Order Specific Question:   Is Patient Fasting?/# of Hours     Answer:   12    Comprehensive Metabolic Panel     Standing Status:   Future     Standing Expiration Date:   6/21/2023       Orders Placed This Encounter   Medications    methylPREDNISolone (MEDROL DOSEPACK) 4 MG tablet     Sig: Take by mouth.      Dispense:  21 tablet     Refill:  0    tiZANidine (ZANAFLEX) 2 MG tablet     Sig: Take 1 tablet by mouth nightly as needed (muscle spasms)     Dispense:  14 tablet     Refill:  0     Side effects, adverse effects of the medication prescribed today, as well as treatment plan/ rationale and result expectations have been discussed with the patient who expresses understanding and desires to proceed. Close follow up to evaluate treatment results and for coordination of care. I have reviewed the patient's medical history in detail and updated the computerized patient record. As always, patient is advised that if symptoms worsen in any way they will proceed to the nearest emergency room. ERIK marie.     Leander Freeman, APRN - CNP

## 2022-06-22 DIAGNOSIS — E78.2 MIXED HYPERLIPIDEMIA: ICD-10-CM

## 2022-06-22 LAB
ALBUMIN SERPL-MCNC: 4.1 G/DL (ref 3.5–4.6)
ALP BLD-CCNC: 78 U/L (ref 35–104)
ALT SERPL-CCNC: 20 U/L (ref 0–41)
ANION GAP SERPL CALCULATED.3IONS-SCNC: 12 MEQ/L (ref 9–15)
AST SERPL-CCNC: 19 U/L (ref 0–40)
BILIRUB SERPL-MCNC: 0.8 MG/DL (ref 0.2–0.7)
BUN BLDV-MCNC: 20 MG/DL (ref 8–23)
CALCIUM SERPL-MCNC: 9.1 MG/DL (ref 8.5–9.9)
CHLORIDE BLD-SCNC: 104 MEQ/L (ref 95–107)
CHOLESTEROL, TOTAL: 171 MG/DL (ref 0–199)
CO2: 24 MEQ/L (ref 20–31)
CREAT SERPL-MCNC: 0.92 MG/DL (ref 0.7–1.2)
GFR AFRICAN AMERICAN: >60
GFR NON-AFRICAN AMERICAN: >60
GLOBULIN: 2.8 G/DL (ref 2.3–3.5)
GLUCOSE BLD-MCNC: 93 MG/DL (ref 70–99)
HDLC SERPL-MCNC: 39 MG/DL (ref 40–59)
LDL CHOLESTEROL CALCULATED: 106 MG/DL (ref 0–129)
POTASSIUM SERPL-SCNC: 4.5 MEQ/L (ref 3.4–4.9)
SODIUM BLD-SCNC: 140 MEQ/L (ref 135–144)
TOTAL PROTEIN: 6.9 G/DL (ref 6.3–8)
TRIGL SERPL-MCNC: 130 MG/DL (ref 0–150)

## 2022-12-13 DIAGNOSIS — N40.1 BPH WITH OBSTRUCTION/LOWER URINARY TRACT SYMPTOMS: ICD-10-CM

## 2022-12-13 DIAGNOSIS — N13.8 BPH WITH OBSTRUCTION/LOWER URINARY TRACT SYMPTOMS: ICD-10-CM

## 2022-12-13 LAB — PROSTATE SPECIFIC ANTIGEN: 1.33 NG/ML (ref 0–4)

## 2022-12-15 ENCOUNTER — OFFICE VISIT (OUTPATIENT)
Dept: UROLOGY | Age: 77
End: 2022-12-15

## 2022-12-15 VITALS
DIASTOLIC BLOOD PRESSURE: 86 MMHG | WEIGHT: 231 LBS | BODY MASS INDEX: 34.21 KG/M2 | SYSTOLIC BLOOD PRESSURE: 120 MMHG | HEART RATE: 77 BPM | HEIGHT: 69 IN

## 2022-12-15 DIAGNOSIS — N13.8 BPH WITH OBSTRUCTION/LOWER URINARY TRACT SYMPTOMS: Primary | ICD-10-CM

## 2022-12-15 DIAGNOSIS — N40.1 BPH WITH OBSTRUCTION/LOWER URINARY TRACT SYMPTOMS: Primary | ICD-10-CM

## 2022-12-15 ASSESSMENT — ENCOUNTER SYMPTOMS
ABDOMINAL DISTENTION: 0
ABDOMINAL PAIN: 0

## 2022-12-15 NOTE — PROGRESS NOTES
Subjective:      Patient ID: Cecile Obrien is a 68 y.o. male    HPI   This is a 67 yo white male with h/o HTN, CAD, CVA (lt eye vision loss), kidney stones, BPH w/LUTs on Proscar and Flomax back in follow-up. Since last seen on 12/14/21, he has no new  complaints. He denies hematuria and has no dysuria or pain. He has stable and intermittent urgency and frequency but no UI. He has a good flow. He has no interval colic. He reports no SE from his BPH medications. I reviewed the interval PSA. He has no new medical or surgical problems. Lt hydrocelectomy on 1/19/15    Past Medical History:   Diagnosis Date    BPH (benign prostatic hypertrophy)     CAD (coronary artery disease)     Cerebrovascular disease     Chronic otitis externa 2/11/2016    Chronic suppurative otitis media 11/11/2015    Ear injury 2006    [facility]    ED (erectile dysfunction) 11/28/2014    Hearing loss     History of nephrolithiasis     History of pneumonia 2004    History of prediabetes     Hyperlipidemia 10/10/2013    Hypertension     Hypogonadism in male     Mass of chest wall, left     Optic neuropathy, ischemic     Osteoarthritis of both hips     Paresthesias     Perforation of right tympanic membrane     chronic; Dr. Isai Quiñonez PSA (prostate specific antigen) 2007- 1.12    Seborrheic keratosis 10/10/2013    Sensorineural hearing loss, bilateral     Stable angina (HCC)     Trigger thumb, left thumb      Past Surgical History:   Procedure Laterality Date    CARPAL TUNNEL RELEASE Right 05/30/2017    CCRAVEN PEARL MD    CARPAL TUNNEL RELEASE Left 06/2017    CCRAVEN PEARL MD    COLONOSCOPY  6/2015    Dr. Mandie Espinoza      lower eyelids    [address] RESECTION  3/22/16    DR. BARRAGAN    OTHER SURGICAL HISTORY Left 1/2015    hydrocelectomy    TYMPANOPLASTY      At the [place]     Social History     Socioeconomic History    Marital status:       Spouse name: None Number of children: 2    Years of education: None    Highest education level: None   Tobacco Use    Smoking status: Never    Smokeless tobacco: Never   Substance and Sexual Activity    Alcohol use: Yes     Comment: occasionally    Drug use: No    Sexual activity: Not Currently   Social History Narrative    2 boys in [place]. Retired from [employer]. Social Determinants of Health     Financial Resource Strain: Low Risk     Difficulty of Paying Living Expenses: Not hard at all   Food Insecurity: No Food Insecurity    Worried About Running Out of Food in the Last Year: Never true    Ran Out of Food in the Last Year: Never true   Transportation Needs: No Transportation Needs    Lack of Transportation (Medical): No    Lack of Transportation (Non-Medical): No     Family History   Problem Relation Age of Onset    Stroke Father      Current Outpatient Medications   Medication Sig Dispense Refill    PROLENSA 0.07 % SOLN       moxifloxacin (VIGAMOX) 0.5 % ophthalmic solution       prednisoLONE acetate (PRED FORTE) 1 % ophthalmic suspension       tiZANidine (ZANAFLEX) 2 MG tablet Take 1 tablet by mouth nightly as needed (muscle spasms) 14 tablet 0    simvastatin (ZOCOR) 20 MG tablet TAKE 1 TABLET NIGHTLY 90 tablet 3    metoprolol succinate (TOPROL XL) 25 MG extended release tablet Take 1 tablet by mouth nightly 90 tablet 3    irbesartan (AVAPRO) 150 MG tablet TAKE 1 TABLET DAILY 90 tablet 3    finasteride (PROSCAR) 5 MG tablet TAKE 1 TABLET DAILY 90 tablet 3    tamsulosin (FLOMAX) 0.4 MG capsule Take 1 capsule by mouth daily 90 capsule 3    Naphazoline-Glycerin (CLEAR EYES COOLING COMFORT OP) Apply to eye      Ascorbic Acid (VITAMIN C) 500 MG tablet Take 500 mg by mouth daily      Multiple Vitamins-Minerals (MULTIVITAMIN PO) Take by mouth      aspirin 81 MG tablet Take 81 mg by mouth daily. No current facility-administered medications for this visit. Patient has no known allergies.   reviewed      Review of Systems   Constitutional:  Negative for unexpected weight change. Gastrointestinal:  Negative for abdominal distention and abdominal pain. Genitourinary:  Negative for dysuria, flank pain and hematuria. Objective:   Physical Exam  Abdominal:      General: There is no distension. Palpations: Abdomen is soft. Tenderness: There is no abdominal tenderness. Genitourinary:     Prostate: Enlarged. Not tender and no nodules present. Rectum: No external hemorrhoid. Neurological:      Mental Status: He is alert. PSA   Date Value Ref Range Status   12/13/2022 1.33 0.00 - 4.00 ng/mL Final   04/02/2021 1.35 0.00 - 6.22 ng/mL Final     Comment:     When the Total PSA is between 3.00 and 10.00 ng/mL, consider  requesting a Free PSA to aid in diagnosis. 12/09/2020 1.51 0.00 - 6.22 ng/mL Final   12/09/2019 1.27 0.00 - 6.22 ng/mL Final   12/10/2018 1.42 0.00 - 6.22 ng/mL Final     Diagnostic Psa   Date Value Ref Range Status   05/05/2014 1.41 0.00 - 5.40 ng/mL Final   04/24/2013 1.3 0.0 - 4.0 ng/mL Final     Uroflow: 7 ml/sec, vol 43 cc  post void residual by U/S: 142 cc    Assessment: This is a 67 yo white male with h/o HTN, CAD, CVA (lt eye vision loss), kidney stones, BPH w/LUTs on Proscar and Flomax (stable) with stable bladder emptying. He has a normal and stable PSA even corrected for use of 5 LINDA and he wants to continue with yearly PSA testing.       Plan:      F/U 1 yr for PSA        Rosita Gifford MD

## 2023-02-06 ENCOUNTER — OFFICE VISIT (OUTPATIENT)
Dept: FAMILY MEDICINE CLINIC | Age: 78
End: 2023-02-06
Payer: MEDICARE

## 2023-02-06 VITALS
HEIGHT: 69 IN | SYSTOLIC BLOOD PRESSURE: 122 MMHG | OXYGEN SATURATION: 96 % | WEIGHT: 233 LBS | HEART RATE: 81 BPM | BODY MASS INDEX: 34.51 KG/M2 | DIASTOLIC BLOOD PRESSURE: 64 MMHG

## 2023-02-06 DIAGNOSIS — N40.1 BENIGN PROSTATIC HYPERPLASIA WITH INCOMPLETE BLADDER EMPTYING: ICD-10-CM

## 2023-02-06 DIAGNOSIS — Z00.00 MEDICARE ANNUAL WELLNESS VISIT, SUBSEQUENT: Primary | ICD-10-CM

## 2023-02-06 DIAGNOSIS — R39.14 BENIGN PROSTATIC HYPERPLASIA WITH INCOMPLETE BLADDER EMPTYING: ICD-10-CM

## 2023-02-06 DIAGNOSIS — E78.2 MIXED HYPERLIPIDEMIA: ICD-10-CM

## 2023-02-06 DIAGNOSIS — I10 ESSENTIAL HYPERTENSION: ICD-10-CM

## 2023-02-06 PROCEDURE — 3074F SYST BP LT 130 MM HG: CPT | Performed by: NURSE PRACTITIONER

## 2023-02-06 PROCEDURE — 1123F ACP DISCUSS/DSCN MKR DOCD: CPT | Performed by: NURSE PRACTITIONER

## 2023-02-06 PROCEDURE — G8484 FLU IMMUNIZE NO ADMIN: HCPCS | Performed by: NURSE PRACTITIONER

## 2023-02-06 PROCEDURE — 3078F DIAST BP <80 MM HG: CPT | Performed by: NURSE PRACTITIONER

## 2023-02-06 PROCEDURE — G0439 PPPS, SUBSEQ VISIT: HCPCS | Performed by: NURSE PRACTITIONER

## 2023-02-06 SDOH — ECONOMIC STABILITY: HOUSING INSECURITY
IN THE LAST 12 MONTHS, WAS THERE A TIME WHEN YOU DID NOT HAVE A STEADY PLACE TO SLEEP OR SLEPT IN A SHELTER (INCLUDING NOW)?: NO

## 2023-02-06 SDOH — ECONOMIC STABILITY: FOOD INSECURITY: WITHIN THE PAST 12 MONTHS, THE FOOD YOU BOUGHT JUST DIDN'T LAST AND YOU DIDN'T HAVE MONEY TO GET MORE.: NEVER TRUE

## 2023-02-06 SDOH — ECONOMIC STABILITY: FOOD INSECURITY: WITHIN THE PAST 12 MONTHS, YOU WORRIED THAT YOUR FOOD WOULD RUN OUT BEFORE YOU GOT MONEY TO BUY MORE.: NEVER TRUE

## 2023-02-06 SDOH — ECONOMIC STABILITY: INCOME INSECURITY: HOW HARD IS IT FOR YOU TO PAY FOR THE VERY BASICS LIKE FOOD, HOUSING, MEDICAL CARE, AND HEATING?: NOT HARD AT ALL

## 2023-02-06 ASSESSMENT — PATIENT HEALTH QUESTIONNAIRE - PHQ9
SUM OF ALL RESPONSES TO PHQ QUESTIONS 1-9: 0
2. FEELING DOWN, DEPRESSED OR HOPELESS: 0
SUM OF ALL RESPONSES TO PHQ9 QUESTIONS 1 & 2: 0
SUM OF ALL RESPONSES TO PHQ QUESTIONS 1-9: 0
1. LITTLE INTEREST OR PLEASURE IN DOING THINGS: 0

## 2023-02-06 NOTE — PATIENT INSTRUCTIONS
Learning About Being Active as an Older Adult  Why is being active important as you get older? Being active is one of the best things you can do for your health. And it's never too late to start. Being active--or getting active, if you aren't already--has definite benefits. It can:  Give you more energy,  Keep your mind sharp. Improve balance to reduce your risk of falls. Help you manage chronic illness with fewer medicines. No matter how old you are, how fit you are, or what health problems you have, there is a form of activity that will work for you. And the more physical activity you can do, the better your overall health will be. What kinds of activity can help you stay healthy? Being more active will make your daily activities easier. Physical activity includes planned exercise and things you do in daily life. There are four types of activity:  Aerobic. Doing aerobic activity makes your heart and lungs strong. Includes walking, dancing, and gardening. Aim for at least 2½ hours spread throughout the week. It improves your energy and can help you sleep better. Muscle-strengthening. This type of activity can help maintain muscle and strengthen bones. Includes climbing stairs, using resistance bands, and lifting or carrying heavy loads. Aim for at least twice a week. It can help protect the knees and other joints. Stretching. Stretching gives you better range of motion in joints and muscles. Includes upper arm stretches, calf stretches, and gentle yoga. Aim for at least twice a week, preferably after your muscles are warmed up from other activities. It can help you function better in daily life. Balancing. This helps you stay coordinated and have good posture. Includes heel-to-toe walking, rashawn chi, and certain types of yoga. Aim for at least 3 days a week. It can reduce your risk of falling.   Even if you have a hard time meeting the recommendations, it's better to be more active than less active. All activity done in each category counts toward your weekly total. You'd be surprised how daily things like carrying groceries, keeping up with grandchildren, and taking the stairs can add up. What keeps you from being active? If you've had a hard time being more active, you're not alone. Maybe you remember being able to do more. Or maybe you've never thought of yourself as being active. It's frustrating when you can't do the things you want. Being more active can help. What's holding you back? Getting started. Have a goal, but break it into easy tasks. Small steps build into big accomplishments. Staying motivated. If you feel like skipping your activity, remember your goal. Maybe you want to move better and stay independent. Every activity gets you one step closer. Not feeling your best.  Start with 5 minutes of an activity you enjoy. Prove to yourself you can do it. As you get comfortable, increase your time. You may not be where you want to be. But you're in the process of getting there. Everyone starts somewhere. How can you find safe ways to stay active? Talk with your doctor about any physical challenges you're facing. Make a plan with your doctor if you have a health problem or aren't sure how to get started with activity. If you're already active, ask your doctor if there is anything you should change to stay safe as your body and health change. If you tend to feel dizzy after you take medicine, avoid activity at that time. Try being active before you take your medicine. This will reduce your risk of falls. If you plan to be active at home, make sure to clear your space before you get started. Remove things like TV cords, coffee tables, and throw rugs. It's safest to have plenty of space to move freely. The key to getting more active is to take it slow and steady. Try to improve only a little bit at a time.  Pick just one area to improve on at first. And if an activity hurts, stop and talk to your doctor. Where can you learn more? Go to http://www.griggs.com/ and enter P600 to learn more about \"Learning About Being Active as an Older Adult. \"  Current as of: October 10, 2022               Content Version: 13.5  © 2728-8326 Healthwise, Incorporated. Care instructions adapted under license by [facility] ([facility]). If you have questions about a medical condition or this instruction, always ask your healthcare professional. [place] [address] any warranty or liability for your use of this information. Advance Directives: Care Instructions  Overview  An advance directive is a legal way to state your wishes at the end of your life. It tells your family and your doctor what to do if you can't say what you want. There are two main types of advance directives. You can change them any time your wishes change. Living will. This form tells your family and your doctor your wishes about life support and other treatment. The form is also called a declaration. Medical power of . This form lets you name a person to make treatment decisions for you when you can't speak for yourself. This person is called a health care agent (health care proxy, health care surrogate). The form is also called a durable power of  for health care. If you do not have an advance directive, decisions about your medical care may be made by a family member, or by a doctor or a  who doesn't know you. It may help to think of an advance directive as a gift to the people who care for you. If you have one, they won't have to make tough decisions by themselves. For more information, including forms for your state, see the [address] website (www.caringinfo.org/planning/advance-directives/). Follow-up care is a key part of your treatment and safety. Be sure to make and go to all appointments, and call your doctor if you are having problems.  It's also a good idea to know your test results and keep a list of the medicines you take. What should you include in an advance directive? Many states have a unique advance directive form. (It may ask you to address specific issues.) Or you might use a universal form that's approved by many states. If your form doesn't tell you what to address, it may be hard to know what to include in your advance directive. Use the questions below to help you get started. Who do you want to make decisions about your medical care if you are not able to? What life-support measures do you want if you have a serious illness that gets worse over time or can't be cured? What are you most afraid of that might happen? (Maybe you're afraid of having pain, losing your independence, or being kept alive by machines.)  Where would you prefer to die? (Your home? A hospital? A nursing home?)  Do you want to donate your organs when you die? Do you want certain [facility] practices performed before you die? When should you call for help? Be sure to contact your doctor if you have any questions. Where can you learn more? Go to http://[employer].griggs.com/ and enter R264 to learn more about \"Advance Directives: Care Instructions. \"  Current as of: June 16, 2022               Content Version: 13.5  © 1654-8588 Healthwise, Incorporated. Care instructions adapted under license by [facility] ([facility]). If you have questions about a medical condition or this instruction, always ask your healthcare professional. [place] [address] any warranty or liability for your use of this information. Starting a Weight Loss Plan: Care Instructions  Overview     If you're thinking about losing weight, it can be hard to know where to start. Your doctor can help you set up a weight loss plan that best meets your needs. You may want to take a class on nutrition or exercise, or you could join a weight loss support group.  If you have questions about how to make changes to your eating or exercise habits, ask your doctor about seeing a registered dietitian or an exercise specialist.  It can be a big challenge to lose weight. But you don't have to make huge changes at once. Make small changes, and stick with them. When those changes become habit, add a few more changes. If you don't think you're ready to make changes right now, try to pick a date in the future. Make an appointment to see your doctor to discuss whether the time is right for you to start a plan. Follow-up care is a key part of your treatment and safety. Be sure to make and go to all appointments, and call your doctor if you are having problems. It's also a good idea to know your test results and keep a list of the medicines you take. How can you care for yourself at home? Set realistic goals. Many people expect to lose much more weight than is likely. A weight loss of 5% to 10% of your body weight may be enough to improve your health. Get family and friends involved to provide support. Talk to them about why you are trying to lose weight, and ask them to help. They can help by participating in exercise and having meals with you, even if they may be eating something different. Find what works best for you. If you do not have time or do not like to cook, a program that offers meal replacement bars or shakes may be better for you. Or if you like to prepare meals, finding a plan that includes daily menus and recipes may be best.  Ask your doctor about other health professionals who can help you achieve your weight loss goals. A dietitian can help you make healthy changes in your diet. An exercise specialist or  can help you develop a safe and effective exercise program.  A counselor or psychiatrist can help you cope with issues such as depression, anxiety, or family problems that can make it hard to focus on weight loss.   Consider joining a support group for people who are trying to lose weight. Your doctor can suggest groups in your area. Where can you learn more? Go to http://www.woods.com/ and enter U357 to learn more about \"Starting a Weight Loss Plan: Care Instructions. \"  Current as of: August 25, 2022               Content Version: 13.5  © 5412-6637 [email or web address]. Care instructions adapted under license by [facility] ([facility]). If you have questions about a medical condition or this instruction, always ask your healthcare professional. [place] [address] any warranty or liability for your use of this information. A Healthy Heart: Care Instructions  Your Care Instructions     Coronary artery disease, also called heart disease, occurs when a substance called plaque builds up in the vessels that supply oxygen-rich blood to your heart muscle. This can narrow the blood vessels and reduce blood flow. A heart attack happens when blood flow is completely blocked. A high-fat diet, smoking, and other factors increase the risk of heart disease. Your doctor has found that you have a chance of having heart disease. You can do lots of things to keep your heart healthy. It may not be easy, but you can change your diet, exercise more, and quit smoking. These steps really work to lower your chance of heart disease. Follow-up care is a key part of your treatment and safety. Be sure to make and go to all appointments, and call your doctor if you are having problems. It's also a good idea to know your test results and keep a list of the medicines you take. How can you care for yourself at home? Diet    Use less salt when you cook and eat. This helps lower your blood pressure. Taste food before salting. Add only a little salt when you think you need it.  With time, your taste buds will adjust to less salt.     Eat fewer snack items, fast foods, canned soups, and other high-salt, high-fat, processed foods.     Read food labels and try to avoid saturated and trans fats. They increase your risk of heart disease by raising cholesterol levels.     Limit the amount of solid fat-butter, margarine, and shortening-you eat. Use olive, peanut, or canola oil when you cook. Bake, broil, and steam foods instead of frying them.     Eat a variety of fruit and vegetables every day. Dark green, deep orange, red, or yellow fruits and vegetables are especially good for you. Examples include spinach, carrots, peaches, and berries.     Foods high in fiber can reduce your cholesterol and provide important vitamins and minerals. High-fiber foods include whole-grain cereals and breads, oatmeal, beans, brown rice, citrus fruits, and apples.     Eat lean proteins. Heart-healthy proteins include seafood, lean meats and poultry, eggs, beans, peas, nuts, seeds, and soy products.     Limit drinks and foods with added sugar. These include candy, desserts, and soda pop. Lifestyle changes    If your doctor recommends it, get more exercise. Walking is a good choice. Bit by bit, increase the amount you walk every day. Try for at least 30 minutes on most days of the week. You also may want to swim, bike, or do other activities.     Do not smoke. If you need help quitting, talk to your doctor about stop-smoking programs and medicines. These can increase your chances of quitting for good. Quitting smoking may be the most important step you can take to protect your heart. It is never too late to quit.     Limit alcohol to 2 drinks a day for men and 1 drink a day for women. Too much alcohol can cause health problems.     Manage other health problems such as diabetes, high blood pressure, and high cholesterol. If you think you may have a problem with alcohol or drug use, talk to your doctor. Medicines    Take your medicines exactly as prescribed. Call your doctor if you think you are having a problem with your medicine.     If your doctor recommends aspirin, take the amount directed each day.  Make sure you take aspirin and not another kind of pain reliever, such as acetaminophen (Tylenol). When should you call for help? Call 911 if you have symptoms of a heart attack. These may include:    Chest pain or pressure, or a strange feeling in the chest.     Sweating.     Shortness of breath.     Pain, pressure, or a strange feeling in the back, neck, jaw, or upper belly or in one or both shoulders or arms.     Lightheadedness or sudden weakness.     A fast or irregular heartbeat. After you call 911, the  may tell you to chew 1 adult-strength or 2 to 4 low-dose aspirin. Wait for an ambulance. Do not try to drive yourself. Watch closely for changes in your health, and be sure to contact your doctor if you have any problems. Where can you learn more? Go to http://Sparkle.cs.griggs.com/ and enter F075 to learn more about \"A Healthy Heart: Care Instructions. \"  Current as of: September 7, 2022               Content Version: 13.5  © 0625-3786 [employer]. Care instructions adapted under license by [facility] ([facility]). If you have questions about a medical condition or this instruction, always ask your healthcare professional. [place] [address] any warranty or liability for your use of this information. Personalized Preventive Plan for Dane Delaney - 2/6/2023  Medicare offers a range of preventive health benefits. Some of the tests and screenings are paid in full while other may be subject to a deductible, co-insurance, and/or copay. Some of these benefits include a comprehensive review of your medical history including lifestyle, illnesses that may run in your family, and various assessments and screenings as appropriate. After reviewing your medical record and screening and assessments performed today your provider may have ordered immunizations, labs, imaging, and/or referrals for you.   A list of these orders (if applicable) as well as your Preventive Care list are included within your After Visit Summary for your review. Other Preventive Recommendations:    A preventive eye exam performed by an eye specialist is recommended every 1-2 years to screen for glaucoma; cataracts, macular degeneration, and other eye disorders. A preventive dental visit is recommended every 6 months. Try to get at least 150 minutes of exercise per week or 10,000 steps per day on a pedometer . Order or download the FREE \"Exercise & Physical Activity: Your Everyday Guide\" from [employer]. Call 4-441.202.7398 or search [employer] A[employer] online. You need 4584-7816 mg of calcium and 2004-0398 IU of vitamin D per day. It is possible to meet your calcium requirement with diet alone, but a vitamin D supplement is usually necessary to meet this goal.  When exposed to the sun, use a sunscreen that protects against both UVA and UVB radiation with an SPF of 30 or greater. Reapply every 2 to 3 hours or after sweating, drying off with a towel, or swimming. Always wear a seat belt when traveling in a car. Always wear a helmet when riding a bicycle or motorcycle.

## 2023-02-06 NOTE — PROGRESS NOTES
Medicare Annual Wellness Visit    Osvaldo Springer is here for Medicare AWV    Assessment & Plan   Medicare annual wellness visit, subsequent  Mixed hyperlipidemia  Essential hypertension  Benign prostatic hyperplasia with incomplete bladder emptying    All chronic issues currently well maintained. Will fu with labs in 6 mos. Recommendations for Preventive Services Due: see orders and patient instructions/AVS.  Recommended screening schedule for the next 5-10 years is provided to the patient in written form: see Patient Instructions/AVS.     Return in 6 months (on 8/6/2023) for Medicare Annual Wellness Visit in 1 year. Subjective   The following acute and/or chronic problems were also addressed today:  None    Patient's complete Health Risk Assessment and screening values have been reviewed and are found in Flowsheets. The following problems were reviewed today and where indicated follow up appointments were made and/or referrals ordered. Positive Risk Factor Screenings with Interventions:               General HRA Questions:  Select all that apply: (!) New or Increased Pain (shoulder pain)    Pain Interventions:  Patient comments: chronic issues, worse lately. Takes NSAIDs intermittently. Product of wear and tear. Likely from previous employment.         Weight and Activity:  Physical Activity: Inactive    Days of Exercise per Week: 0 days    Minutes of Exercise per Session: 0 min     On average, how many days per week do you engage in moderate to strenuous exercise (like a brisk walk)?: 0 days  Have you lost any weight without trying in the past 3 months?: No  Body mass index: (!) 34.91    Inactivity Interventions:  Patient declined any further interventions or treatment  Obesity Interventions:  Patient declines any further evaluation or treatment       Hearing Screen:  Do you or your family notice any trouble with your hearing that hasn't been managed with hearing aids?: (!) Yes    Interventions:  Patient comments: has one bad hearing aide     Safety:  Do all of your stairways have a railing or banister?: (!) No  Interventions:  Patient comments: doesn't go down unless absolutely necessary                   Objective   Vitals:    02/06/23 0940   BP: 122/64   Pulse: 81   SpO2: 96%   Weight: 233 lb (105.7 kg)   Height: 5' 8.5\" (1.74 m)      Body mass index is 34.91 kg/m². General Appearance: alert and oriented to person, place and time, well developed and well- nourished, in no acute distress  Skin: warm and dry, no rash or erythema  Head: normocephalic and atraumatic  Eyes: pupils equal, round, and reactive to light, extraocular eye movements intact, conjunctivae normal  ENT: tympanic membrane, external ear and ear canal normal bilaterally, nose without deformity, nasal mucosa and turbinates normal without polyps  Neck: supple and non-tender without mass, no thyromegaly or thyroid nodules, no cervical lymphadenopathy  Pulmonary/Chest: clear to auscultation bilaterally- no wheezes, rales or rhonchi, normal air movement, no respiratory distress  Cardiovascular: normal rate, regular rhythm, normal S1 and S2, no murmurs, rubs, clicks, or gallops, distal pulses intact, no carotid bruits  Abdomen: soft, non-tender, non-distended, normal bowel sounds, no masses or organomegaly  Extremities: no cyanosis, clubbing or edema  Musculoskeletal: normal range of motion, no joint swelling, deformity or tenderness  Neurologic: reflexes normal and symmetric, no cranial nerve deficit, gait, coordination and speech normal       No Known Allergies  Prior to Visit Medications    Medication Sig Taking?  Authorizing Provider   PROLENSA 0.07 % SOLN  Yes Historical Provider, MD   moxifloxacin (VIGAMOX) 0.5 % ophthalmic solution  Yes Historical Provider, MD   prednisoLONE acetate (PRED FORTE) 1 % ophthalmic suspension  Yes Historical Provider, MD   tiZANidine (ZANAFLEX) 2 MG tablet Take 1 tablet by mouth nightly as needed (muscle spasms) Yes GIL Leon CNP   simvastatin (ZOCOR) 20 MG tablet TAKE 1 TABLET NIGHTLY Yes Stephan PALACIO Holiday, DO   metoprolol succinate (TOPROL XL) 25 MG extended release tablet Take 1 tablet by mouth nightly Yes Stephan PALACIO Holiday, DO   irbesartan (AVAPRO) 150 MG tablet TAKE 1 TABLET DAILY Yes Stephan PALACIO Holiday, DO   finasteride (PROSCAR) 5 MG tablet TAKE 1 TABLET DAILY Yes Peggy Cooney MD   tamsulosin (FLOMAX) 0.4 MG capsule Take 1 capsule by mouth daily Yes Peggy Cooney MD   Naphazoline-Glycerin ([place] 9293 OP) Apply to eye Yes Historical Provider, MD   Ascorbic Acid (VITAMIN C) 500 MG tablet Take 500 mg by mouth daily Yes Historical Provider, MD   Multiple Vitamins-Minerals (MULTIVITAMIN PO) Take by mouth Yes Historical Provider, MD   aspirin 81 MG tablet Take 81 mg by mouth daily.  Yes Historical Provider, MD [place] (Including outside providers/suppliers regularly involved in providing care):   Patient Care Team:  GIL Leon CNP as PCP - General (Family Nurse Practitioner)  GIL Leon CNP as PCP - Empaneled Provider  Peggy Cooney MD (Urology)  Shelton Duke MD (Family Medicine)  Tomy Medina MD (General Surgery)     Reviewed and updated this visit:  Tobacco  Allergies  Meds  Med Hx  Surg Hx  Soc Hx  Fam Hx             GIL Leon CNP

## 2023-03-07 DIAGNOSIS — I10 ESSENTIAL HYPERTENSION: ICD-10-CM

## 2023-03-07 DIAGNOSIS — N40.1 BPH WITH OBSTRUCTION/LOWER URINARY TRACT SYMPTOMS: ICD-10-CM

## 2023-03-07 DIAGNOSIS — N13.8 BPH WITH OBSTRUCTION/LOWER URINARY TRACT SYMPTOMS: ICD-10-CM

## 2023-03-07 NOTE — TELEPHONE ENCOUNTER
Please approve or deny this refill request. The order is pended. Thank you.     LOV 4/29/2022    Next Visit Date:  Future Appointments   Date Time Provider Department Center   4/21/2023 11:15 AM Stephan Nixon JaramilloDO [place] [facility] [address] [facility]   8/7/2023  9:15 AM GIL Thompson CNP [facility] EM[facility]GENCY [facility] AT [place]   12/21/2023  [address] [facility] ([place], [place]), [address] [facility] [place]   2/7/2024  9:00 AM GIL Thompson CNP [facility] EM[facility]GENC[facility] AT [place]

## 2023-03-08 RX ORDER — SIMVASTATIN 20 MG
TABLET ORAL
Qty: 90 TABLET | Refills: 3 | Status: SHIPPED | OUTPATIENT
Start: 2023-03-08

## 2023-03-08 RX ORDER — METOPROLOL SUCCINATE 25 MG/1
TABLET, EXTENDED RELEASE ORAL
Qty: 90 TABLET | Refills: 3 | Status: SHIPPED | OUTPATIENT
Start: 2023-03-08

## 2023-03-08 RX ORDER — IRBESARTAN 150 MG/1
TABLET ORAL
Qty: 90 TABLET | Refills: 3 | Status: SHIPPED | OUTPATIENT
Start: 2023-03-08

## 2023-03-08 RX ORDER — FINASTERIDE 5 MG/1
TABLET, FILM COATED ORAL
Qty: 90 TABLET | Refills: 3 | OUTPATIENT
Start: 2023-03-08

## 2023-03-21 ENCOUNTER — OFFICE VISIT (OUTPATIENT)
Dept: FAMILY MEDICINE CLINIC | Age: 78
End: 2023-03-21
Payer: MEDICARE

## 2023-03-21 VITALS
WEIGHT: 226 LBS | DIASTOLIC BLOOD PRESSURE: 74 MMHG | BODY MASS INDEX: 33.47 KG/M2 | HEART RATE: 84 BPM | SYSTOLIC BLOOD PRESSURE: 132 MMHG | OXYGEN SATURATION: 98 % | HEIGHT: 69 IN

## 2023-03-21 DIAGNOSIS — G89.29 CHRONIC LEFT SHOULDER PAIN: ICD-10-CM

## 2023-03-21 DIAGNOSIS — M25.559 HIP PAIN: ICD-10-CM

## 2023-03-21 DIAGNOSIS — L98.9 SKIN LESION OF CHEEK: ICD-10-CM

## 2023-03-21 DIAGNOSIS — M16.11 OSTEOARTHRITIS OF RIGHT HIP, UNSPECIFIED OSTEOARTHRITIS TYPE: Primary | ICD-10-CM

## 2023-03-21 DIAGNOSIS — F51.01 PRIMARY INSOMNIA: ICD-10-CM

## 2023-03-21 DIAGNOSIS — M25.512 CHRONIC LEFT SHOULDER PAIN: ICD-10-CM

## 2023-03-21 PROCEDURE — 3078F DIAST BP <80 MM HG: CPT | Performed by: NURSE PRACTITIONER

## 2023-03-21 PROCEDURE — G8417 CALC BMI ABV UP PARAM F/U: HCPCS | Performed by: NURSE PRACTITIONER

## 2023-03-21 PROCEDURE — 1123F ACP DISCUSS/DSCN MKR DOCD: CPT | Performed by: NURSE PRACTITIONER

## 2023-03-21 PROCEDURE — G8427 DOCREV CUR MEDS BY ELIG CLIN: HCPCS | Performed by: NURSE PRACTITIONER

## 2023-03-21 PROCEDURE — 1036F TOBACCO NON-USER: CPT | Performed by: NURSE PRACTITIONER

## 2023-03-21 PROCEDURE — G8484 FLU IMMUNIZE NO ADMIN: HCPCS | Performed by: NURSE PRACTITIONER

## 2023-03-21 PROCEDURE — 3075F SYST BP GE 130 - 139MM HG: CPT | Performed by: NURSE PRACTITIONER

## 2023-03-21 PROCEDURE — 99214 OFFICE O/P EST MOD 30 MIN: CPT | Performed by: NURSE PRACTITIONER

## 2023-03-21 RX ORDER — TRAZODONE HYDROCHLORIDE 100 MG/1
100 TABLET ORAL NIGHTLY PRN
Qty: 90 TABLET | Refills: 1 | Status: SHIPPED | OUTPATIENT
Start: 2023-03-21

## 2023-03-21 NOTE — PROGRESS NOTES
range of motion. Decreased strength. Skin:     General: Skin is warm. Neurological:      General: No focal deficit present. Mental Status: He is alert and oriented to person, place, and time. Mental status is at baseline. Psychiatric:         Mood and Affect: Mood normal.         Behavior: Behavior normal.         Thought Content: Thought content normal.         Judgment: Judgment normal.       Assessment & Plan     Diagnosis Orders   1. Osteoarthritis of right hip, unspecified osteoarthritis type  [facility] [address] MD, Orthopaedic Surgery - [facility]      2. Hip pain  [facility] [address] THAKKAR, Orthopaedic Surgery - [place]      3. Chronic left shoulder pain  [facility] [address]Lalito THAKKAR, Orthopaedic Surgery - [place]      4. Skin lesion of Tan Lopez MD, Dermatology, [place]      5.  Primary insomnia  traZODone (DESYREL) 100 MG tablet          Orders Placed This Encounter   Procedures    [facility] [address] THAKKAR, Orthopaedic Surgery [facility]     Referral Priority:   Routine     Referral Type:   Eval and Treat     Referral Reason:   Specialty Services Required     Referred to Provider:   Maria C Healy MD     Requested Specialty:   Orthopedic Surgery     Number of Visits Requested:   Indiana Vázquez MD, Dermatology, [place]     Referral Priority:   Routine     Referral Type:   Eval and Treat     Referral Reason:   Specialty Services Required     Referred to Provider:   Jenn Viera MD     Requested Specialty:   Family Medicine     Number of Visits Requested:   1       Orders Placed This Encounter   Medications    Handicap Placard MISC     Sig: by Does not apply route Good for 5 years     Dispense:  1 each     Refill:  0    traZODone (DESYREL) 100 MG tablet     Sig: Take 1 tablet by mouth nightly as needed for Sleep     Dispense:  90 tablet     Refill:  1       Side effects, adverse effects of the

## 2023-03-23 ENCOUNTER — OFFICE VISIT (OUTPATIENT)
Dept: FAMILY MEDICINE CLINIC | Age: 78
End: 2023-03-23

## 2023-03-23 VITALS — TEMPERATURE: 98.7 F | WEIGHT: 226 LBS | HEIGHT: 69 IN | BODY MASS INDEX: 33.47 KG/M2

## 2023-03-23 DIAGNOSIS — L82.1 SEBORRHEIC KERATOSES: Primary | ICD-10-CM

## 2023-03-23 DIAGNOSIS — L21.9 SEBORRHEIC DERMATITIS: ICD-10-CM

## 2023-03-23 RX ORDER — FLUCONAZOLE 100 MG/1
100 TABLET ORAL DAILY
Qty: 7 TABLET | Refills: 0 | Status: SHIPPED | OUTPATIENT
Start: 2023-03-23 | End: 2023-03-30

## 2023-03-23 RX ORDER — FLUCONAZOLE 100 MG/1
100 TABLET ORAL DAILY
Qty: 7 TABLET | Refills: 0 | Status: SHIPPED | OUTPATIENT
Start: 2023-03-23 | End: 2023-03-23 | Stop reason: SDUPTHER

## 2023-03-23 ASSESSMENT — ENCOUNTER SYMPTOMS: COLOR CHANGE: 1

## 2023-03-23 NOTE — PROGRESS NOTES
the center of the wart and it is appropriate for it to come out. If exposed skin remains, treat that area as you would a ruptured blister as mentioned above.

## 2023-03-28 DIAGNOSIS — N13.8 BPH WITH OBSTRUCTION/LOWER URINARY TRACT SYMPTOMS: ICD-10-CM

## 2023-03-28 DIAGNOSIS — N40.1 BPH WITH OBSTRUCTION/LOWER URINARY TRACT SYMPTOMS: ICD-10-CM

## 2023-03-29 DIAGNOSIS — M16.11 PRIMARY OSTEOARTHRITIS OF RIGHT HIP: Primary | ICD-10-CM

## 2023-03-30 ENCOUNTER — OFFICE VISIT (OUTPATIENT)
Dept: ORTHOPEDIC SURGERY | Age: 78
End: 2023-03-30

## 2023-03-30 VITALS
BODY MASS INDEX: 33.47 KG/M2 | WEIGHT: 226 LBS | HEART RATE: 90 BPM | RESPIRATION RATE: 16 BRPM | HEIGHT: 69 IN | OXYGEN SATURATION: 97 % | TEMPERATURE: 98.8 F | DIASTOLIC BLOOD PRESSURE: 70 MMHG | SYSTOLIC BLOOD PRESSURE: 114 MMHG

## 2023-03-30 DIAGNOSIS — M25.512 CHRONIC LEFT SHOULDER PAIN: ICD-10-CM

## 2023-03-30 DIAGNOSIS — M16.11 PRIMARY OSTEOARTHRITIS OF RIGHT HIP: Primary | ICD-10-CM

## 2023-03-30 DIAGNOSIS — G89.29 CHRONIC LEFT SHOULDER PAIN: ICD-10-CM

## 2023-03-30 ASSESSMENT — ENCOUNTER SYMPTOMS
DIARRHEA: 0
ABDOMINAL PAIN: 0
CONSTIPATION: 0
COUGH: 0
EYE ITCHING: 0
EYE DISCHARGE: 0
SHORTNESS OF BREATH: 0
EYE PAIN: 0

## 2023-03-30 NOTE — PROGRESS NOTES
interested in pursuing surgical options. We discussed an intra-articular steroid injection. We will make arrangements for him to receive an ultrasonically guided steroid injection into the right hip. Follow-up as needed. Orders Placed This Encounter   Procedures    Do Mcgovern MD, Sports Medicine, [place]     Referral Priority:   Routine     Referral Type:   Eval and Treat     Referral Reason:   Specialty Services Required     Referred to Provider:   Veronica Dominguez, DO     Requested Specialty:   Internal Medicine Sports Medicine     Number of Visits Requested:   1       No orders of the defined types were placed in this encounter. Return if symptoms worsen or fail to improve.     Nasir Figueroa MD

## 2023-03-31 RX ORDER — FINASTERIDE 5 MG/1
TABLET, FILM COATED ORAL
Qty: 90 TABLET | Refills: 3 | Status: SHIPPED | OUTPATIENT
Start: 2023-03-31

## 2023-04-17 ENCOUNTER — PROCEDURE VISIT (OUTPATIENT)
Dept: SPORTS MEDICINE | Age: 78
End: 2023-04-17

## 2023-04-17 ENCOUNTER — TELEPHONE (OUTPATIENT)
Dept: PAIN MANAGEMENT | Age: 78
End: 2023-04-17

## 2023-04-17 DIAGNOSIS — M70.61 TROCHANTERIC BURSITIS OF RIGHT HIP: Primary | ICD-10-CM

## 2023-04-17 RX ORDER — LIDOCAINE HYDROCHLORIDE 10 MG/ML
3 INJECTION, SOLUTION INFILTRATION; PERINEURAL ONCE
Status: COMPLETED | OUTPATIENT
Start: 2023-04-17 | End: 2023-04-17

## 2023-04-17 RX ORDER — BETAMETHASONE SODIUM PHOSPHATE AND BETAMETHASONE ACETATE 3; 3 MG/ML; MG/ML
6 INJECTION, SUSPENSION INTRA-ARTICULAR; INTRALESIONAL; INTRAMUSCULAR; SOFT TISSUE ONCE
Status: COMPLETED | OUTPATIENT
Start: 2023-04-17 | End: 2023-04-17

## 2023-04-17 RX ADMIN — BETAMETHASONE SODIUM PHOSPHATE AND BETAMETHASONE ACETATE 6 MG: 3; 3 INJECTION, SUSPENSION INTRA-ARTICULAR; INTRALESIONAL; INTRAMUSCULAR; SOFT TISSUE at 09:58

## 2023-04-17 RX ADMIN — LIDOCAINE HYDROCHLORIDE 3 ML: 10 INJECTION, SOLUTION INFILTRATION; PERINEURAL at 09:59

## 2023-04-17 NOTE — TELEPHONE ENCOUNTER
Prior authorization has been started for lidocaine 5% patch on Cover My Meds, clinical uploaded, authorization pending Key: [id number] Case ID: [id number] help? Call us at [phone]  Status      Approvedtoday  Request Reference Number: [id number]. LIDOCAINE PAD 5% is approved through 12/31/2023. Your patient may now fill this prescription and it will be covered.

## 2023-04-17 NOTE — PROGRESS NOTES
@[facility]@   Spine Surgery  Advanced Pain Management           Provider: Janine King DO          Patient Name: Lukas Gorman : 1945         Date: 23          PROCEDURE: TROCHANTERIC BURSA INJECTION UNDER ULTRASOUND GUIDANCE  DX Right trochanteric bursitis    After informed consent patient was put in a recumbant position, the right trocanteric bursa was exposed and draped. Using msk US the bursa was identified and prepped with Betadine. Using US guidance a 22g needle was used to inject 1cc celestone, 2cc lidocaine, with relief of symptoms.   Pt tolerated procedure well, left stable, told to ice the hip today and resume normal activity in 2 days  US images of procedure were saved

## 2023-04-21 ENCOUNTER — OFFICE VISIT (OUTPATIENT)
Dept: CARDIOLOGY CLINIC | Age: 78
End: 2023-04-21

## 2023-04-21 VITALS
SYSTOLIC BLOOD PRESSURE: 136 MMHG | WEIGHT: 228 LBS | DIASTOLIC BLOOD PRESSURE: 74 MMHG | BODY MASS INDEX: 34.16 KG/M2 | HEART RATE: 81 BPM

## 2023-04-21 DIAGNOSIS — I10 PRIMARY HYPERTENSION: ICD-10-CM

## 2023-04-21 DIAGNOSIS — E66.01 MORBIDLY OBESE (HCC): ICD-10-CM

## 2023-04-21 DIAGNOSIS — I25.10 CORONARY ARTERY DISEASE INVOLVING NATIVE CORONARY ARTERY OF NATIVE HEART WITHOUT ANGINA PECTORIS: Primary | ICD-10-CM

## 2023-04-21 DIAGNOSIS — I10 BENIGN ESSENTIAL HYPERTENSION: ICD-10-CM

## 2023-04-21 DIAGNOSIS — E78.2 MIXED HYPERLIPIDEMIA: ICD-10-CM

## 2023-04-21 NOTE — PROGRESS NOTES
(TOPROL XL) 25 MG extended release tablet TAKE 1 TABLET NIGHTLY 90 tablet 3    PROLENSA 0.07 % SOLN       moxifloxacin (VIGAMOX) 0.5 % ophthalmic solution       prednisoLONE acetate (PRED FORTE) 1 % ophthalmic suspension       tiZANidine (ZANAFLEX) 2 MG tablet Take 1 tablet by mouth nightly as needed (muscle spasms) 14 tablet 0    tamsulosin (FLOMAX) 0.4 MG capsule Take 1 capsule by mouth daily 90 capsule 3    Naphazoline-Glycerin (CLEAR EYES COOLING COMFORT OP) Apply to eye      Ascorbic Acid (VITAMIN C) 500 MG tablet Take 1 tablet by mouth daily      Multiple Vitamins-Minerals (MULTIVITAMIN PO) Take by mouth      aspirin 81 MG tablet Take 1 tablet by mouth daily       No current facility-administered medications for this visit. Patient has no known allergies. Review of Systems:  General ROS: negative  Psychological ROS: negative  Hematological and Lymphatic ROS: No history of blood clots or bleeding disorder. Respiratory ROS: no cough, shortness of breath, or wheezing  Cardiovascular ROS: negative  Gastrointestinal ROS: negative  Genito-Urinary ROS: no dysuria, trouble voiding, or hematuria  Musculoskeletal ROS: negative  Neurological ROS: no TIA or stroke symptoms  Dermatological ROS: negative    VITALS:  Blood pressure 136/74, pulse 81, weight 228 lb (103.4 kg). Body mass index is 34.16 kg/m². Physical Examination:  General appearance - alert, well appearing, and in no distress  Mental status - alert, oriented to person, place, and time  Neck - Neck is supple, no JVD or carotid bruits. No thyromegaly or adenopathy.    Chest - clear to auscultation, no wheezes, rales or rhonchi, symmetric air entry  Heart - normal rate, regular rhythm, normal S1, S2, no murmurs, rubs, clicks or gallops  Abdomen - soft, nontender, nondistended, no masses or organomegaly  Neurological - alert, oriented, normal speech, no focal findings or movement disorder noted  Extremities - peripheral pulses normal, no pedal

## 2023-04-28 ENCOUNTER — PROCEDURE VISIT (OUTPATIENT)
Dept: SPORTS MEDICINE | Age: 78
End: 2023-04-28

## 2023-04-28 DIAGNOSIS — M19.011 PRIMARY OSTEOARTHRITIS OF RIGHT SHOULDER: ICD-10-CM

## 2023-04-28 DIAGNOSIS — M19.012 PRIMARY OSTEOARTHRITIS OF LEFT SHOULDER: Primary | ICD-10-CM

## 2023-04-28 RX ORDER — BETAMETHASONE SODIUM PHOSPHATE AND BETAMETHASONE ACETATE 3; 3 MG/ML; MG/ML
12 INJECTION, SUSPENSION INTRA-ARTICULAR; INTRALESIONAL; INTRAMUSCULAR; SOFT TISSUE ONCE
Status: COMPLETED | OUTPATIENT
Start: 2023-04-28 | End: 2023-04-28

## 2023-04-28 RX ORDER — BUPIVACAINE HYDROCHLORIDE 2.5 MG/ML
3 INJECTION, SOLUTION INFILTRATION; PERINEURAL ONCE
Status: COMPLETED | OUTPATIENT
Start: 2023-04-28 | End: 2023-04-28

## 2023-04-28 RX ORDER — LIDOCAINE HYDROCHLORIDE 10 MG/ML
3 INJECTION, SOLUTION INFILTRATION; PERINEURAL ONCE
Status: COMPLETED | OUTPATIENT
Start: 2023-04-28 | End: 2023-04-28

## 2023-04-28 RX ADMIN — LIDOCAINE HYDROCHLORIDE 3 ML: 10 INJECTION, SOLUTION INFILTRATION; PERINEURAL at 11:56

## 2023-04-28 RX ADMIN — BUPIVACAINE HYDROCHLORIDE 7.5 MG: 2.5 INJECTION, SOLUTION INFILTRATION; PERINEURAL at 11:56

## 2023-04-28 RX ADMIN — BETAMETHASONE SODIUM PHOSPHATE AND BETAMETHASONE ACETATE 12 MG: 3; 3 INJECTION, SUSPENSION INTRA-ARTICULAR; INTRALESIONAL; INTRAMUSCULAR; SOFT TISSUE at 11:55

## 2023-04-28 ASSESSMENT — ENCOUNTER SYMPTOMS
CHEST TIGHTNESS: 0
SINUS PAIN: 0
APNEA: 0
ABDOMINAL DISTENTION: 0
FACIAL SWELLING: 0
EYE DISCHARGE: 0

## 2023-04-28 NOTE — PROGRESS NOTES
He is alert. Ortho Exam   Examination of the right shoulder revealed the neurovascular muscle status to be intact patient had abduction and forward flexion to 90 degrees internal rotation to L3 rotator cuff appear to be intact with positive supraspinatus sign generalized shoulder tenderness positive Abie sign    I reviewed the x-rays of the right shoulder done today    Assessment:      Diagnosis Orders   1. Primary osteoarthritis of left shoulder  Ambulatory referral to Physical Therapy    IN ARTHROCENTESIS ASPIR&/INJ MAJOR JT/BURSA W/US      2. Primary osteoarthritis of right shoulder  IN ARTHROCENTESIS ASPIR&/INJ MAJOR JT/BURSA W/US    XR SHOULDER RIGHT (MIN 2 VIEWS)    Ambulatory referral to Physical Therapy          Plan:   Regarding both shoulders  I am going to start the patient on a physical therapy program I am also going to have the patient return for an injection in the right shoulder  We did inject the left shoulder today with relief of symptoms he is to return for the injection on the right    @[facility]@   Spine Surgery  Advanced Pain Management           Provider: Koki Rutledge DO          Patient Name: Emigdio King : 1945         Date: 23          PROCEDURE: US Shoulder Injection  Dx DJD left shoulder    After informed consent patient was put in a seated position, the left shoulder was exposed and draped. Using msk US the shoulder joint was identified and prepped with Betadine. Using US guidance a 22g needle was used to inject 2cc celestone, 3cc lidocaine, and 3cc marcaine with relief of symptoms.   Pt tolerated procedure well, left stable, told to ice the shoulder today and resume normal activity in 2 days  US images of procedure were saved        Orders Placed This Encounter   Medications    betamethasone acetate-betamethasone sodium phosphate (CELESTONE) injection 12 mg    bupivacaine (MARCAINE) 0.25 % injection 7.5 mg    lidocaine 1 % injection 3 mL       Orders

## 2023-05-05 ENCOUNTER — PROCEDURE VISIT (OUTPATIENT)
Dept: SPORTS MEDICINE | Age: 78
End: 2023-05-05

## 2023-05-05 DIAGNOSIS — M19.011 PRIMARY OSTEOARTHRITIS OF RIGHT SHOULDER: Primary | ICD-10-CM

## 2023-05-05 RX ORDER — LIDOCAINE HYDROCHLORIDE 10 MG/ML
3 INJECTION, SOLUTION INFILTRATION; PERINEURAL ONCE
Status: COMPLETED | OUTPATIENT
Start: 2023-05-05 | End: 2023-05-05

## 2023-05-05 RX ORDER — BETAMETHASONE SODIUM PHOSPHATE AND BETAMETHASONE ACETATE 3; 3 MG/ML; MG/ML
12 INJECTION, SUSPENSION INTRA-ARTICULAR; INTRALESIONAL; INTRAMUSCULAR; SOFT TISSUE ONCE
Status: COMPLETED | OUTPATIENT
Start: 2023-05-05 | End: 2023-05-05

## 2023-05-05 RX ORDER — BUPIVACAINE HYDROCHLORIDE 2.5 MG/ML
3 INJECTION, SOLUTION INFILTRATION; PERINEURAL ONCE
Status: COMPLETED | OUTPATIENT
Start: 2023-05-05 | End: 2023-05-05

## 2023-05-05 RX ADMIN — LIDOCAINE HYDROCHLORIDE 3 ML: 10 INJECTION, SOLUTION INFILTRATION; PERINEURAL at 10:15

## 2023-05-05 RX ADMIN — BETAMETHASONE SODIUM PHOSPHATE AND BETAMETHASONE ACETATE 12 MG: 3; 3 INJECTION, SUSPENSION INTRA-ARTICULAR; INTRALESIONAL; INTRAMUSCULAR; SOFT TISSUE at 10:15

## 2023-05-05 RX ADMIN — BUPIVACAINE HYDROCHLORIDE 7.5 MG: 2.5 INJECTION, SOLUTION INFILTRATION; PERINEURAL at 10:15

## 2023-05-05 NOTE — PROGRESS NOTES
@[facility]@   Spine Surgery  Advanced Pain Management           Provider: Suresh Lackey DO          Patient Name: Almita Matamoros : 1945         Date: 23          PROCEDURE: US Shoulder Injection  Dx DJD right shoulder    After informed consent patient was put in a seated position, the right shoulder was exposed and draped. Using msk US the shoulder joint was identified and prepped with Betadine. Using US guidance a 22g needle was used to inject 2cc celestone, 3cc lidocaine, and 3cc marcaine with relief of symptoms.   Pt tolerated procedure well, left stable, told to ice the shoulder today and resume normal activity in 2 days  US images of procedure were saved

## 2023-05-09 ENCOUNTER — HOSPITAL ENCOUNTER (OUTPATIENT)
Dept: PHYSICAL THERAPY | Age: 78
Setting detail: THERAPIES SERIES
Discharge: HOME | End: 2023-05-09
Payer: MEDICARE

## 2023-05-09 DIAGNOSIS — N40.1 BPH WITH OBSTRUCTION/LOWER URINARY TRACT SYMPTOMS: ICD-10-CM

## 2023-05-09 DIAGNOSIS — N13.8 BPH WITH OBSTRUCTION/LOWER URINARY TRACT SYMPTOMS: ICD-10-CM

## 2023-05-09 PROCEDURE — 97110 THERAPEUTIC EXERCISES: CPT

## 2023-05-09 PROCEDURE — 97161 PT EVAL LOW COMPLEX 20 MIN: CPT

## 2023-05-09 RX ORDER — FINASTERIDE 5 MG/1
5 TABLET, FILM COATED ORAL DAILY
Qty: 90 TABLET | Refills: 3 | Status: SHIPPED | OUTPATIENT
Start: 2023-05-09

## 2023-05-09 ASSESSMENT — PAIN DESCRIPTION - PAIN TYPE: TYPE: CHRONIC PAIN

## 2023-05-09 ASSESSMENT — PAIN DESCRIPTION - ORIENTATION: ORIENTATION: RIGHT;LEFT

## 2023-05-09 ASSESSMENT — PAIN SCALES - GENERAL: PAINLEVEL_OUTOF10: 0

## 2023-05-09 ASSESSMENT — PAIN DESCRIPTION - LOCATION: LOCATION: SHOULDER

## 2023-05-09 NOTE — PROGRESS NOTES
[facility]  PHYSICAL THERAPY EVALUATION      Physical Therapy: Initial Evaluation    Patient: Lukas Gorman (18 y.o.     male)   Examination Date: 2023   :  1945 ;    Confirmed: Yes MRN: [id number]  CSN: [id number]   Insurance: Payor: Aliza Melvin / Plan: MEDICARE PART A AND B / Product Type: *No Product type* /   Insurance ID: [id number] - (Medicare)    Secondary Insurance (if applicable): [facility]    Referring Physician: Samir Gomez DO       Visits to Date/Visits Approved: 1 /      No Show/Cancelled Appts: 0 / 0     Medical Diagnosis: Primary osteoarthritis of left shoulder [M19.012]  Primary osteoarthritis of right shoulder [M19.011]        Treatment Diagnosis: B shoulder pain, decreased B shoulder AROM, decreased B thoracic rotation AROM, decreased B UE strength, decreased posture, and decreased functional activity tolerance     PERTINENT MEDICAL HISTORY   Patient Assessed for Rehabilitation Services: Yes       Medical History: Chart Reviewed: Yes   Past Medical History:   Diagnosis Date    BPH (benign prostatic hypertrophy)     CAD (coronary artery disease)     Cerebrovascular disease     Chronic otitis externa 2016    Chronic suppurative otitis media 2015    Ear injury 2006    [facility]    ED (erectile dysfunction) 2014    Hearing loss     History of nephrolithiasis     History of pneumonia     History of prediabetes     Hyperlipidemia 10/10/2013    Hypertension     Hypogonadism in male     Mass of chest wall, left     Optic neuropathy, ischemic     Osteoarthritis of both hips     Paresthesias     Perforation of right tympanic membrane     chronic; Dr. Chelsy Mortensen    Screening PSA (prostate specific antigen) - 1.12    Seborrheic keratosis 10/10/2013    Sensorineural hearing loss, bilateral     Stable angina (HCC)     Trigger thumb, left thumb      Surgical History:   Past Surgical History:   Procedure Laterality Date

## 2023-05-09 NOTE — PROGRESS NOTES
[address]  PHYSICAL THERAPY PLAN OF CARE   [address]   [place], [address] [facility]         Phone: [phone]  Fax: [phone]    [x] Certification  [] Recertification [x]  Plan of Care  [] Progress Note [] Discharge      Referring Provider: Sundeep Goel DO     From:  Augustus Parham, PT, DPT  Patient: Jordan Stacy (68 y.o. male) : 1945 Date: 2023  Medical Diagnosis: Primary osteoarthritis of left shoulder [M19.012]  Primary osteoarthritis of right shoulder [M19.011]       Treatment Diagnosis: B shoulder pain, decreased B shoulder AROM, decreased B thoracic rotation AROM, decreased B UE strength, decreased posture, and decreased functional activity tolerance    Plan of Care/Certification Expiration Date: 23   Progress Report Period from:  2023  to 2023    Visits to Date: 1 No Show: 0 Cancelled Appts: 0    OBJECTIVE:   Long Term Goals - Time Frame for Long Term Goals : 3 weeks  Goals Current/ Discharge status Status   Long Term Goal 1: The pt will be indep/compliant with HEP in order to self-manage symptoms upon D/C  ongoing   New   Long Term Goal 2: The pt will have an increase in UEFI score >/=10 points in order to increase functional activity tolerance  Exam: UEFI 59/80     New   Long Term Goal 3: The pt will demo improved B shoulder flex, abd, ext AROM >/=10* R ER >/=T3, B IR >/=T9 and thoracic rotation AROM >/=75% in order to increase ease with ADL's     AROM LUE (degrees)  L Shoulder Flexion (0-180): 118  L Shoulder Extension (0-45): 50  L Shoulder ABduction (0-180): 125  L Shoulder Int Rotation  (0-70): T12  L Shoulder Ext Rotation  (0-90): T3   AROM RUE (degrees)  R Shoulder Flexion (0-180): 140  R Shoulder Extension (0-45): 55  R Shoulder ABduction (0-180): 114  R Shoulder Int Rotation  (0-70): L1  R Shoulder Ext Rotation (0-90): T1      AROM Thoracic Spine   Thoracic spine general AROM: Bilateral rotation 50% New   Long Term Goal 4: The pt will

## 2023-05-09 NOTE — TELEPHONE ENCOUNTER
[facility] Order is attached to sign.

## 2023-05-15 ENCOUNTER — HOSPITAL ENCOUNTER (OUTPATIENT)
Dept: PHYSICAL THERAPY | Age: 78
Setting detail: THERAPIES SERIES
Discharge: HOME | End: 2023-05-15
Payer: MEDICARE

## 2023-05-15 PROCEDURE — 97110 THERAPEUTIC EXERCISES: CPT

## 2023-05-15 NOTE — PROGRESS NOTES
[address] [facility]  Outpatient Physical Therapy    Treatment Note        Date: 5/15/2023  Patient: Emigdio King  : 1945   Confirmed: Yes  MRN: [id number]  Referring Provider: Sailaja Macedo DO    Medical Diagnosis: Primary osteoarthritis of left shoulder [M19.012]  Primary osteoarthritis of right shoulder [M19.011]       Treatment Diagnosis: B shoulder pain, decreased B shoulder AROM, decreased B thoracic rotation AROM, decreased B UE strength, decreased posture, and decreased functional activity tolerance    Visit Information:  Insurance: Payor: Lupe Kelly / Plan: MEDICARE PART A AND B / Product Type: *No Product type* /   PT Visit Information  Onset Date:  (chronic >/=25 years)  Total # of Visits to Date: 2  Plan of Care/Certification Expiration Date: 23  No Show: 0  Progress Note Due Date: 23  Canceled Appointment: 0  Progress Note Counter: 2/3-6 (PN due 23)    Subjective Information:  Subjective: Reports he was performing yardwork and was doing well. \"Night and day difference since the shots. \"  HEP Compliance:  [x] Good [] Fair [] Poor [] Reports not doing due to:    Pain Screening  Patient Currently in Pain: Denies    Treatment:  Exercises:  Exercises  Exercise 1: supine wand flexion, ER 5\"x10  Exercise 2: flexion wall slides 5\"x10  Exercise 4: pulleys x 3 minutes  Exercise 6: standing wand abduction and  extension x 10 ea  Exercise 7: thoracic rotation (UE at 90/90 against wall) x 10 ea way  Exercise 8: Tband rows/lats x 10 -5s  Exercise 9: Tband ER/IR x 10 ea YTB  Exercise 20: HEP: Cont Current + Tband Strengthening     Objective Measures:      Assessment:    Body Structures, Functions, Activity Limitations Requiring Skilled Therapeutic Intervention: Decreased ADL status, Decreased ROM, Decreased strength, Decreased tolerance to work activity, Decreased posture, Increased pain, Decreased high-level IADLs  Assessment: Initiated gentle bilateral shoulder

## 2023-05-22 ENCOUNTER — HOSPITAL ENCOUNTER (OUTPATIENT)
Dept: PHYSICAL THERAPY | Age: 78
Setting detail: THERAPIES SERIES
Discharge: HOME | End: 2023-05-22
Payer: MEDICARE

## 2023-05-22 PROCEDURE — 97110 THERAPEUTIC EXERCISES: CPT

## 2023-05-22 NOTE — PROGRESS NOTES
[address] [facility]  Outpatient Physical Therapy    Treatment Note        Date: 2023  Patient: Christina Capone  : 1945   Confirmed: Yes  MRN: [id number]  Referring Provider: Marie Golden DO    Medical Diagnosis: Primary osteoarthritis of left shoulder [M19.012]  Primary osteoarthritis of right shoulder [M19.011]       Treatment Diagnosis: B shoulder pain, decreased B shoulder AROM, decreased B thoracic rotation AROM, decreased B UE strength, decreased posture, and decreased functional activity tolerance    Visit Information:  Insurance: Payor: Saranya Martini / Plan: MEDICARE PART A AND B / Product Type: *No Product type* /   PT Visit Information  Onset Date:  (chronic >/=25 years)  Total # of Visits to Date: 3  Plan of Care/Certification Expiration Date: 23  No Show: 0  Progress Note Due Date: 23  Canceled Appointment: 0  Progress Note Counter: 3/3- (PN due 23)    Subjective Information:  Subjective: \"I have been busy, but everything has been ok. \" Says he has been doing yard work and the shoulder has been doing good.   HEP Compliance:  [x] Good [] Fair [] Poor [] Reports not doing due to:    Pain Screening  Patient Currently in Pain: Denies    Treatment:  Exercises:  Exercises  Exercise 1: Bilateral wall slides with lift off x 10  Exercise 2: bilateral flexion wall slides 5\"x10 / abduction x 10  Exercise 3: Corner Stretch (W) 5 x 10\" / Lat stretch 5 x 10\" claire  Exercise 4: UBE x 2 min F/R; Lvl 1  Exercise 6: standing Tband abduction YTBx 10 ea  Exercise 8: Tband rows/lats x 12 -5s RTB  Exercise 9: Tband ER/IR x 10 ea RTB claire  Exercise 20: HEP: Cont Current     Objective Measures:           LTG 1 Current Status[de-identified] 23: Pt reports completing HEP when he has time; on going     LTG 3 Current Status[de-identified] 23: Rt [place] Flexion: 140, Lt Flexion: 134, Rt Abduction: 135, Lt Abduction: 135, Rt ER: T3, Lt ER: T3, Rt IR: T12, Lt IR: T10     LTG 5 Current Status[de-identified]

## 2023-05-26 ENCOUNTER — OFFICE VISIT (OUTPATIENT)
Dept: SPORTS MEDICINE | Age: 78
End: 2023-05-26
Payer: MEDICARE

## 2023-05-26 VITALS
TEMPERATURE: 97.2 F | WEIGHT: 222 LBS | HEIGHT: 69 IN | SYSTOLIC BLOOD PRESSURE: 128 MMHG | BODY MASS INDEX: 32.88 KG/M2 | DIASTOLIC BLOOD PRESSURE: 72 MMHG

## 2023-05-26 DIAGNOSIS — M19.011 PRIMARY OSTEOARTHRITIS OF RIGHT SHOULDER: Primary | ICD-10-CM

## 2023-05-26 DIAGNOSIS — M19.012 PRIMARY OSTEOARTHRITIS OF LEFT SHOULDER: ICD-10-CM

## 2023-05-26 PROCEDURE — 1036F TOBACCO NON-USER: CPT | Performed by: FAMILY MEDICINE

## 2023-05-26 PROCEDURE — G8417 CALC BMI ABV UP PARAM F/U: HCPCS | Performed by: FAMILY MEDICINE

## 2023-05-26 PROCEDURE — G8427 DOCREV CUR MEDS BY ELIG CLIN: HCPCS | Performed by: FAMILY MEDICINE

## 2023-05-26 PROCEDURE — 1123F ACP DISCUSS/DSCN MKR DOCD: CPT | Performed by: FAMILY MEDICINE

## 2023-05-26 PROCEDURE — 3074F SYST BP LT 130 MM HG: CPT | Performed by: FAMILY MEDICINE

## 2023-05-26 PROCEDURE — 99213 OFFICE O/P EST LOW 20 MIN: CPT | Performed by: FAMILY MEDICINE

## 2023-05-26 PROCEDURE — 3078F DIAST BP <80 MM HG: CPT | Performed by: FAMILY MEDICINE

## 2023-05-26 ASSESSMENT — ENCOUNTER SYMPTOMS
ABDOMINAL DISTENTION: 0
SINUS PAIN: 0
FACIAL SWELLING: 0
EYE DISCHARGE: 0
APNEA: 0
CHEST TIGHTNESS: 0

## 2023-05-26 NOTE — PROGRESS NOTES
degrees of abduction forward flexion internal rotation to about L1 the rotator cuff is intact no significant palpable tenderness    Assessment:      Diagnosis Orders   1. Primary osteoarthritis of right shoulder        2. Primary osteoarthritis of left shoulder            Plan:   Patient is doing well at this point  Continue on a home exercise program is to return to clinic as needed       No orders of the defined types were placed in this encounter. No orders of the defined types were placed in this encounter. Follow up:  Return if symptoms worsen or fail to improve.     JATINDER MONTEIRO, DO

## 2023-05-30 ENCOUNTER — HOSPITAL ENCOUNTER (OUTPATIENT)
Dept: PHYSICAL THERAPY | Age: 78
Setting detail: THERAPIES SERIES
Discharge: HOME | End: 2023-05-30
Payer: MEDICARE

## 2023-05-30 PROCEDURE — 97110 THERAPEUTIC EXERCISES: CPT

## 2023-05-30 ASSESSMENT — PAIN SCALES - GENERAL: PAINLEVEL_OUTOF10: 5

## 2023-05-30 ASSESSMENT — PAIN DESCRIPTION - LOCATION: LOCATION: SHOULDER

## 2023-05-30 ASSESSMENT — PAIN DESCRIPTION - ORIENTATION: ORIENTATION: LEFT;RIGHT

## 2023-05-30 NOTE — PROGRESS NOTES
[address]  PHYSICAL THERAPY PLAN OF CARE   [address], [address] [facility]         Phone: [phone]  Fax: [phone]    [] Certification  [] Recertification []  Plan of Care  [] Progress Note [x] Discharge      Referring Provider: Tiffany Bertrand DO     From:  Leopold Konig, PT, DPT  Patient: Socorro Cabrera (5 y.o. male) : 1945 Date: 2023  Medical Diagnosis: Primary osteoarthritis of left shoulder [M19.012]  Primary osteoarthritis of right shoulder [M19.011]       Treatment Diagnosis: B shoulder pain, decreased B shoulder AROM, decreased B thoracic rotation AROM, decreased B UE strength, decreased posture, and decreased functional activity tolerance    Plan of Care/Certification Expiration Date: 23   Progress Report Period from:  2023  to 2023    Visits to Date: 4 No Show: 0 Cancelled Appts: 0    OBJECTIVE:     Long Term Goals - Time Frame for Long Term Goals : 3 weeks  Goals Current/ Discharge status Status   Long Term Goal 1: The pt will be indep/compliant with HEP in order to self-manage symptoms upon D/C LTG 1 Current Status[de-identified] 23: indep/compliant w/ HEP   Met   Long Term Goal 2: The pt will have an increase in UEFI score >/=10 points in order to increase functional activity tolerance LTG 2 Current Status[de-identified] 23: UEFI=75/80   Met   Long Term Goal 3: The pt will demo improved B shoulder flex, abd, ext AROM >/=10* R ER >/=T3, B IR >/=T9 and thoracic rotation AROM >/=75% in order to increase ease with ADL's LTG 3 Current Status[de-identified] 23: AROM, shoulder flexion, right 140 deg, left 129 deg, Abd right 120 deg, left 123 deg, ER right T-1, left T-1, IR right L-2, left T-12, standing   Partially Met    Long Term Goal 4: The pt will demonstrate improved B UE strength >/=1/2 grade in order to lift/carry with decreased pain LTG 4 Current Status[de-identified] 5-30-23: MMT, shoulder flexion 4/5, b/l, abduction right 4-/5, left 4/5, ER 4-/5,

## 2023-05-30 NOTE — PROGRESS NOTES
[address] [facility]  Outpatient Physical Therapy    Treatment Note        Date: 2023  Patient: Gulshan Rg  : 1945   Confirmed: Yes  MRN: [id number]  Referring Provider: Yuliya Espinal DO    Medical Diagnosis: Primary osteoarthritis of left shoulder [M19.012]  Primary osteoarthritis of right shoulder [M19.011]       Treatment Diagnosis: B shoulder pain, decreased B shoulder AROM, decreased B thoracic rotation AROM, decreased B UE strength, decreased posture, and decreased functional activity tolerance    Visit Information:  Insurance: Payor: Eloy Single / Plan: MEDICARE PART A AND B / Product Type: *No Product type* /   PT Visit Information  Onset Date:  (chronic >/=25 years)  Total # of Visits to Date: 4  Plan of Care/Certification Expiration Date: 23  No Show: 0  Progress Note Due Date: 23  Canceled Appointment: 0  Progress Note Counter: /3-6 (PN due 23)    Subjective Information:  Subjective: Pain today is 0-6/92 w/ certain motions, I did alot of yardwork over the weekend.   HEP Compliance:  [x] Good [] Fair [] Poor [] Reports not doing due to:    Pain Screening  Patient Currently in Pain: Yes  Pain Level: 5  Pain Location: Shoulder  Pain Orientation: Left, Right    Treatment:  Exercises:  Exercises  Exercise 2: wall slides, flex, scaption 10 sec x 10, b/l  Exercise 3: corner stretch 20 sec x 3, arms high  Exercise 4: pulleys 4 min, UBE F/R L-1.5, 4 min total  Exercise 5: finger ladder flex/ scaption x 3, b/l  Exercise 6: wand ex, abd, ext, ER, IR, 5 sec x 5, standing  Exercise 8: rows/lats x 20, GTB     *Indicates exercise, modality, or manual techniques to be initiated when appropriate    Objective Measures:        LTG 2 Current Status[de-identified] 23: UEFI=75/80  LTG 3 Current Status[de-identified] 23: AROM, shoulder flexion, right 140 deg, left 129 deg, Abd right 120 deg, left 123 deg, ER right T-1, left T-1, IR right L-2, left T-12, standing  LTG 4 Current Status[de-identified]

## 2023-06-07 DIAGNOSIS — M16.11 PRIMARY OSTEOARTHRITIS OF RIGHT HIP: ICD-10-CM

## 2023-06-08 ENCOUNTER — OFFICE VISIT (OUTPATIENT)
Dept: ORTHOPEDIC SURGERY | Age: 78
End: 2023-06-08

## 2023-06-08 DIAGNOSIS — M16.11 PRIMARY OSTEOARTHRITIS OF RIGHT HIP: Primary | ICD-10-CM

## 2023-06-08 DIAGNOSIS — D69.6 THIN BLOOD (HCC): ICD-10-CM

## 2023-06-08 DIAGNOSIS — E10.44 DIABETIC AMYOTROPHY ASSOCIATED WITH TYPE 1 DIABETES MELLITUS (HCC): ICD-10-CM

## 2023-06-08 DIAGNOSIS — N30.01 ACUTE CYSTITIS WITH HEMATURIA: ICD-10-CM

## 2023-06-08 NOTE — PROGRESS NOTES
Urine    Urinalysis          Plan:    Patient has osteoarthritis of the right hip. The patient is here today requesting a hip replacement. The patient is opted to proceed with a hip replacement. I brought the model in. We sat down and we talked about the surgery. We talked about the recovery. I stressed to the patient the importance of exercise and participation in physical therapy in order to ensure a good outcome. We talked about the recovery. We went over the risks of surgery. Risks include, but are not limited to, infection, neurovascular injury, hematoma formation, wound healing complications, blood clots, intraoperative fracture, postoperative hip instability, limb length discrepancy, persistent postoperative pain and risks of anesthesia. The patient expressed understanding and wishes to proceed with a hip replacement as discussed above. Orders Placed This Encounter   Procedures    Culture, Urine     Standing Status:   Future     Standing Expiration Date:   6/8/2024     Order Specific Question:   Specify (ex-cath, midstream, cysto, etc)? Answer:   MIDSTREAM    Basic Metabolic Panel     Standing Status:   Future     Standing Expiration Date:   6/8/2024    CBC with Auto Differential     Standing Status:   Future     Standing Expiration Date:   6/8/2024    Hemoglobin A1C     Standing Status:   Future     Standing Expiration Date:   6/8/2024    Prealbumin     Standing Status:   Future     Standing Expiration Date:   6/8/2024    Protime-INR     Standing Status:   Future     Standing Expiration Date:   6/8/2024     Order Specific Question:   Daily Coumadin Dose?      Answer:   NO    Urinalysis     Standing Status:   Future     Standing Expiration Date:   6/8/2024    Ambulatory referral to Home Health     Referral Priority:   Routine     Referral Type:   Home Health Care     Referral Reason:   Specialty Services Required     Number of Visits Requested:   1    Type and screen     Standing Status:

## 2023-06-26 ENCOUNTER — PREP FOR PROCEDURE (OUTPATIENT)
Dept: ORTHOPEDIC SURGERY | Age: 78
End: 2023-06-26

## 2023-07-13 ENCOUNTER — OFFICE VISIT (OUTPATIENT)
Dept: ORTHOPEDIC SURGERY | Age: 78
End: 2023-07-13

## 2023-07-13 ENCOUNTER — HOSPITAL ENCOUNTER (OUTPATIENT)
Dept: PREADMISSION TESTING | Age: 78
Discharge: HOME | End: 2023-07-17
Payer: MEDICARE

## 2023-07-13 VITALS
SYSTOLIC BLOOD PRESSURE: 131 MMHG | BODY MASS INDEX: 33.65 KG/M2 | HEIGHT: 68 IN | DIASTOLIC BLOOD PRESSURE: 84 MMHG | HEART RATE: 78 BPM | WEIGHT: 222 LBS | TEMPERATURE: 97.4 F | OXYGEN SATURATION: 97 %

## 2023-07-13 DIAGNOSIS — D69.6 THIN BLOOD (HCC): ICD-10-CM

## 2023-07-13 DIAGNOSIS — Z01.818 PREOP EXAMINATION: Primary | ICD-10-CM

## 2023-07-13 LAB
ANION GAP SERPL CALCULATED.3IONS-SCNC: 7 MEQ/L (ref 9–15)
BACTERIA URNS QL MICRO: NEGATIVE /HPF
BASOPHILS # BLD: 0 K/UL (ref 0–0.2)
BASOPHILS NFR BLD: 0.3 %
BILIRUB UR QL STRIP: NEGATIVE
BUN SERPL-MCNC: 19 MG/DL (ref 8–23)
CALCIUM SERPL-MCNC: 9.5 MG/DL (ref 8.5–9.9)
CHLORIDE SERPL-SCNC: 104 MEQ/L (ref 95–107)
CLARITY UR: CLEAR
CO2 SERPL-SCNC: 26 MEQ/L (ref 20–31)
COLOR UR: YELLOW
CREAT SERPL-MCNC: 0.93 MG/DL (ref 0.7–1.2)
EOSINOPHIL # BLD: 0.1 K/UL (ref 0–0.7)
EOSINOPHIL NFR BLD: 0.6 %
EPI CELLS #/AREA URNS AUTO: ABNORMAL /HPF (ref 0–5)
ERYTHROCYTE [DISTWIDTH] IN BLOOD BY AUTOMATED COUNT: 13.7 % (ref 11.5–14.5)
GLUCOSE SERPL-MCNC: 104 MG/DL (ref 70–99)
GLUCOSE UR STRIP-MCNC: NEGATIVE MG/DL
HBA1C MFR BLD: 5.8 % (ref 4.8–5.9)
HCT VFR BLD AUTO: 46.2 % (ref 42–52)
HGB BLD-MCNC: 15.4 G/DL (ref 14–18)
HGB UR QL STRIP: NEGATIVE
HYALINE CASTS #/AREA URNS AUTO: ABNORMAL /HPF (ref 0–5)
INR PPP: 1.1
KETONES UR STRIP-MCNC: NEGATIVE MG/DL
LEUKOCYTE ESTERASE UR QL STRIP: NEGATIVE
LYMPHOCYTES # BLD: 1.7 K/UL (ref 1–4.8)
LYMPHOCYTES NFR BLD: 16.3 %
MCH RBC QN AUTO: 30 PG (ref 27–31.3)
MCHC RBC AUTO-ENTMCNC: 33.2 % (ref 33–37)
MCV RBC AUTO: 90.3 FL (ref 79–92.2)
MONOCYTES # BLD: 0.9 K/UL (ref 0.2–0.8)
MONOCYTES NFR BLD: 8.7 %
NEUTROPHILS # BLD: 7.8 K/UL (ref 1.4–6.5)
NEUTS SEG NFR BLD: 74.1 %
NITRITE UR QL STRIP: NEGATIVE
PH UR STRIP: 5.5 [PH] (ref 5–9)
PLATELET # BLD AUTO: 229 K/UL (ref 130–400)
POTASSIUM SERPL-SCNC: 4.5 MEQ/L (ref 3.4–4.9)
PREALB SERPL-MCNC: 23 MG/DL (ref 20–40)
PROT UR STRIP-MCNC: 30 MG/DL
PROTHROMBIN TIME: 14.3 SEC (ref 12.3–14.9)
RBC # BLD AUTO: 5.12 M/UL (ref 4.7–6.1)
RBC #/AREA URNS AUTO: ABNORMAL /HPF (ref 0–5)
SODIUM SERPL-SCNC: 137 MEQ/L (ref 135–144)
SP GR UR STRIP: 1.02 (ref 1–1.03)
UROBILINOGEN UR STRIP-ACNC: 0.2 E.U./DL
WBC # BLD AUTO: 10.6 K/UL (ref 4.8–10.8)
WBC #/AREA URNS AUTO: ABNORMAL /HPF (ref 0–5)

## 2023-07-13 PROCEDURE — 87641 MR-STAPH DNA AMP PROBE: CPT

## 2023-07-13 PROCEDURE — 87086 URINE CULTURE/COLONY COUNT: CPT

## 2023-07-13 PROCEDURE — 83036 HEMOGLOBIN GLYCOSYLATED A1C: CPT

## 2023-07-13 PROCEDURE — 85025 COMPLETE CBC W/AUTO DIFF WBC: CPT

## 2023-07-13 PROCEDURE — 86900 BLOOD TYPING SEROLOGIC ABO: CPT

## 2023-07-13 PROCEDURE — 80048 BASIC METABOLIC PNL TOTAL CA: CPT

## 2023-07-13 PROCEDURE — 84134 ASSAY OF PREALBUMIN: CPT

## 2023-07-13 PROCEDURE — 85610 PROTHROMBIN TIME: CPT

## 2023-07-13 PROCEDURE — PREOPEXAM PRE-OP EXAM: Performed by: PHYSICIAN ASSISTANT

## 2023-07-13 PROCEDURE — 81001 URINALYSIS AUTO W/SCOPE: CPT

## 2023-07-13 PROCEDURE — 86850 RBC ANTIBODY SCREEN: CPT

## 2023-07-13 PROCEDURE — 86901 BLOOD TYPING SEROLOGIC RH(D): CPT

## 2023-07-13 RX ORDER — SODIUM CHLORIDE, SODIUM LACTATE, POTASSIUM CHLORIDE, CALCIUM CHLORIDE 600; 310; 30; 20 MG/100ML; MG/100ML; MG/100ML; MG/100ML
INJECTION, SOLUTION INTRAVENOUS CONTINUOUS
OUTPATIENT
Start: 2023-07-13

## 2023-07-13 RX ORDER — SODIUM CHLORIDE 9 MG/ML
INJECTION, SOLUTION INTRAVENOUS PRN
OUTPATIENT
Start: 2023-07-13

## 2023-07-13 RX ORDER — SODIUM CHLORIDE 0.9 % (FLUSH) 0.9 %
5-40 SYRINGE (ML) INJECTION PRN
OUTPATIENT
Start: 2023-07-13

## 2023-07-13 RX ORDER — CHLORHEXIDINE GLUCONATE 213 G/1000ML
SOLUTION TOPICAL
Qty: 118 ML | Refills: 0 | Status: SHIPPED | OUTPATIENT
Start: 2023-07-13

## 2023-07-13 RX ORDER — SODIUM CHLORIDE 0.9 % (FLUSH) 0.9 %
5-40 SYRINGE (ML) INJECTION EVERY 12 HOURS SCHEDULED
OUTPATIENT
Start: 2023-07-13

## 2023-07-13 NOTE — PATIENT INSTRUCTIONS
-stop taking asprin and motrin until after your surgery. All blood thinners need to be stopped at least 5 days in advance prior to surgery. If you experiencing pain, the only medication you can take for that is tylenol 3-4x / day not to exceed 4000 mg.  -the morning of surgery, do not take your avapro. You can take all other medications as you normally do with sips of water.     -Hibiclens was sent to your pharmacy to . Please follow the instructions provided with the prescription and use daily starting 5 days before surgery. -please make sure that you are cleared by your cardiologist before the surgery date     -no eating / drinking the after midnight the night before surgery.  Sips of water the morning of for all other medications is OK    -our surgery department will reach out the you the day before your surgery and let you know what time to arrive at the [address] [place] [address] (door B, same place as where you got blood work)    If you have any questions, please call our office and I will be happy to answer them

## 2023-07-14 LAB
ABO + RH BLD: NORMAL
BLD GP AB SCN SERPL QL: NORMAL

## 2023-07-15 LAB
BACTERIA UR CULT: NORMAL
MRSA, DNA, NASAL: NEGATIVE
SPECIMEN DESCRIPTION: NORMAL

## 2023-07-17 ENCOUNTER — ANESTHESIA (OUTPATIENT)
Dept: OPERATING ROOM | Age: 78
End: 2023-07-17
Payer: MEDICARE

## 2023-07-17 ENCOUNTER — ANESTHESIA EVENT (OUTPATIENT)
Dept: OPERATING ROOM | Age: 78
End: 2023-07-17
Payer: MEDICARE

## 2023-07-17 ENCOUNTER — HOSPITAL ENCOUNTER (OUTPATIENT)
Age: 78
Setting detail: OBSERVATION
Discharge: NURSING, REHAB OR LONG-TERM CARE | DRG: 560 | End: 2023-07-18
Attending: ORTHOPAEDIC SURGERY | Admitting: ORTHOPAEDIC SURGERY
Payer: MEDICARE

## 2023-07-17 ENCOUNTER — APPOINTMENT (OUTPATIENT)
Dept: GENERAL RADIOLOGY | Age: 78
DRG: 560 | End: 2023-07-17
Attending: ORTHOPAEDIC SURGERY
Payer: MEDICARE

## 2023-07-17 DIAGNOSIS — Z96.641 STATUS POST TOTAL HIP REPLACEMENT, RIGHT: Primary | ICD-10-CM

## 2023-07-17 DIAGNOSIS — G89.18 ACUTE POSTOPERATIVE PAIN: ICD-10-CM

## 2023-07-17 PROCEDURE — G0378 HOSPITAL OBSERVATION PER HR: HCPCS

## 2023-07-17 PROCEDURE — 2500000003 HC RX 250 WO HCPCS: Performed by: ANESTHESIOLOGY

## 2023-07-17 PROCEDURE — 3600000004 HC SURGERY LEVEL 4 BASE: Performed by: ORTHOPAEDIC SURGERY

## 2023-07-17 PROCEDURE — 2580000003 HC RX 258: Performed by: NURSE PRACTITIONER

## 2023-07-17 PROCEDURE — 6360000002 HC RX W HCPCS: Performed by: PHYSICIAN ASSISTANT

## 2023-07-17 PROCEDURE — 2580000003 HC RX 258: Performed by: PHYSICIAN ASSISTANT

## 2023-07-17 PROCEDURE — 7100000000 HC PACU RECOVERY - FIRST 15 MIN: Performed by: ORTHOPAEDIC SURGERY

## 2023-07-17 PROCEDURE — 3700000001 HC ADD 15 MINUTES (ANESTHESIA): Performed by: ORTHOPAEDIC SURGERY

## 2023-07-17 PROCEDURE — 2580000003 HC RX 258: Performed by: ORTHOPAEDIC SURGERY

## 2023-07-17 PROCEDURE — A4216 STERILE WATER/SALINE, 10 ML: HCPCS | Performed by: ORTHOPAEDIC SURGERY

## 2023-07-17 PROCEDURE — 6360000002 HC RX W HCPCS: Performed by: ORTHOPAEDIC SURGERY

## 2023-07-17 PROCEDURE — 27130 TOTAL HIP ARTHROPLASTY: CPT | Performed by: ORTHOPAEDIC SURGERY

## 2023-07-17 PROCEDURE — 6370000000 HC RX 637 (ALT 250 FOR IP): Performed by: NURSE PRACTITIONER

## 2023-07-17 PROCEDURE — 0SR903A REPLACEMENT OF RIGHT HIP JOINT WITH CERAMIC SYNTHETIC SUBSTITUTE, UNCEMENTED, OPEN APPROACH: ICD-10-PCS | Performed by: ORTHOPAEDIC SURGERY

## 2023-07-17 PROCEDURE — 6360000002 HC RX W HCPCS: Performed by: ANESTHESIOLOGY

## 2023-07-17 PROCEDURE — 2709999900 HC NON-CHARGEABLE SUPPLY: Performed by: ORTHOPAEDIC SURGERY

## 2023-07-17 PROCEDURE — 6370000000 HC RX 637 (ALT 250 FOR IP): Performed by: INTERNAL MEDICINE

## 2023-07-17 PROCEDURE — 72170 X-RAY EXAM OF PELVIS: CPT

## 2023-07-17 PROCEDURE — 3700000000 HC ANESTHESIA ATTENDED CARE: Performed by: ORTHOPAEDIC SURGERY

## 2023-07-17 PROCEDURE — 6360000002 HC RX W HCPCS: Performed by: NURSE PRACTITIONER

## 2023-07-17 PROCEDURE — 3600000014 HC SURGERY LEVEL 4 ADDTL 15MIN: Performed by: ORTHOPAEDIC SURGERY

## 2023-07-17 PROCEDURE — C1776 JOINT DEVICE (IMPLANTABLE): HCPCS | Performed by: ORTHOPAEDIC SURGERY

## 2023-07-17 PROCEDURE — 27299 UNLISTED PX PELVIS/HIP JOINT: CPT | Performed by: ORTHOPAEDIC SURGERY

## 2023-07-17 PROCEDURE — 27130 TOTAL HIP ARTHROPLASTY: CPT | Performed by: PHYSICIAN ASSISTANT

## 2023-07-17 PROCEDURE — 7100000001 HC PACU RECOVERY - ADDTL 15 MIN: Performed by: ORTHOPAEDIC SURGERY

## 2023-07-17 PROCEDURE — 94150 VITAL CAPACITY TEST: CPT

## 2023-07-17 PROCEDURE — C1713 ANCHOR/SCREW BN/BN,TIS/BN: HCPCS | Performed by: ORTHOPAEDIC SURGERY

## 2023-07-17 PROCEDURE — 2580000003 HC RX 258: Performed by: ANESTHESIOLOGY

## 2023-07-17 PROCEDURE — 2720000010 HC SURG SUPPLY STERILE: Performed by: ORTHOPAEDIC SURGERY

## 2023-07-17 DEVICE — IMPL CAPPED HIP H2 TOTAL ADV OTHER HEAD STRYKER: Type: IMPLANTABLE DEVICE | Site: HIP | Status: FUNCTIONAL

## 2023-07-17 DEVICE — HIP STEM - HIGH OFFSET
Type: IMPLANTABLE DEVICE | Site: HIP | Status: FUNCTIONAL
Brand: INSIGNIA

## 2023-07-17 DEVICE — 5.5MM BC CORKSCREW FT W/ SUTURETAPE
Type: IMPLANTABLE DEVICE | Site: HIP | Status: FUNCTIONAL
Brand: ARTHREX®

## 2023-07-17 DEVICE — SCREW BNE L25MM DIA6.5MM HEX LO PROF TRIDENT II: Type: IMPLANTABLE DEVICE | Site: HIP | Status: FUNCTIONAL

## 2023-07-17 DEVICE — TRIDENT X3 0 DEGREE POLYETHYLENE INSERT
Type: IMPLANTABLE DEVICE | Site: HIP | Status: FUNCTIONAL
Brand: TRIDENT X3 INSERT

## 2023-07-17 DEVICE — TRIDENT II TRITANIUM CLUSTER 56F
Type: IMPLANTABLE DEVICE | Site: HIP | Status: FUNCTIONAL
Brand: TRIDENT II

## 2023-07-17 DEVICE — CERAMIC V40 FEMORAL HEAD
Type: IMPLANTABLE DEVICE | Site: HIP | Status: FUNCTIONAL
Brand: BIOLOX

## 2023-07-17 RX ORDER — CELECOXIB 200 MG/1
200 CAPSULE ORAL ONCE
Status: COMPLETED | OUTPATIENT
Start: 2023-07-17 | End: 2023-07-17

## 2023-07-17 RX ORDER — SODIUM CHLORIDE, SODIUM LACTATE, POTASSIUM CHLORIDE, CALCIUM CHLORIDE 600; 310; 30; 20 MG/100ML; MG/100ML; MG/100ML; MG/100ML
INJECTION, SOLUTION INTRAVENOUS CONTINUOUS PRN
Status: DISCONTINUED | OUTPATIENT
Start: 2023-07-17 | End: 2023-07-17 | Stop reason: SDUPTHER

## 2023-07-17 RX ORDER — MEPERIDINE HYDROCHLORIDE 25 MG/ML
12.5 INJECTION INTRAMUSCULAR; INTRAVENOUS; SUBCUTANEOUS
Status: DISCONTINUED | OUTPATIENT
Start: 2023-07-17 | End: 2023-07-17 | Stop reason: HOSPADM

## 2023-07-17 RX ORDER — MIDAZOLAM HYDROCHLORIDE 1 MG/ML
INJECTION INTRAMUSCULAR; INTRAVENOUS PRN
Status: DISCONTINUED | OUTPATIENT
Start: 2023-07-17 | End: 2023-07-17 | Stop reason: SDUPTHER

## 2023-07-17 RX ORDER — BUPIVACAINE HYDROCHLORIDE 5 MG/ML
INJECTION, SOLUTION EPIDURAL; INTRACAUDAL
Status: COMPLETED | OUTPATIENT
Start: 2023-07-17 | End: 2023-07-17

## 2023-07-17 RX ORDER — ONDANSETRON 4 MG/1
4 TABLET, ORALLY DISINTEGRATING ORAL EVERY 8 HOURS PRN
Status: DISCONTINUED | OUTPATIENT
Start: 2023-07-17 | End: 2023-07-18 | Stop reason: HOSPADM

## 2023-07-17 RX ORDER — POLYETHYLENE GLYCOL 3350 17 G/17G
17 POWDER, FOR SOLUTION ORAL DAILY PRN
Status: DISCONTINUED | OUTPATIENT
Start: 2023-07-17 | End: 2023-07-18 | Stop reason: HOSPADM

## 2023-07-17 RX ORDER — METOCLOPRAMIDE HYDROCHLORIDE 5 MG/ML
10 INJECTION INTRAMUSCULAR; INTRAVENOUS
Status: DISCONTINUED | OUTPATIENT
Start: 2023-07-17 | End: 2023-07-17 | Stop reason: HOSPADM

## 2023-07-17 RX ORDER — FENTANYL CITRATE 0.05 MG/ML
50 INJECTION, SOLUTION INTRAMUSCULAR; INTRAVENOUS EVERY 10 MIN PRN
Status: DISCONTINUED | OUTPATIENT
Start: 2023-07-17 | End: 2023-07-17 | Stop reason: HOSPADM

## 2023-07-17 RX ORDER — OXYCODONE HCL 10 MG/1
10 TABLET, FILM COATED, EXTENDED RELEASE ORAL ONCE
Status: COMPLETED | OUTPATIENT
Start: 2023-07-17 | End: 2023-07-17

## 2023-07-17 RX ORDER — SODIUM CHLORIDE, SODIUM LACTATE, POTASSIUM CHLORIDE, CALCIUM CHLORIDE 600; 310; 30; 20 MG/100ML; MG/100ML; MG/100ML; MG/100ML
INJECTION, SOLUTION INTRAVENOUS CONTINUOUS
Status: DISCONTINUED | OUTPATIENT
Start: 2023-07-17 | End: 2023-07-17 | Stop reason: HOSPADM

## 2023-07-17 RX ORDER — SODIUM CHLORIDE 9 MG/ML
INJECTION, SOLUTION INTRAVENOUS PRN
Status: DISCONTINUED | OUTPATIENT
Start: 2023-07-17 | End: 2023-07-18 | Stop reason: HOSPADM

## 2023-07-17 RX ORDER — OXYCODONE HYDROCHLORIDE 5 MG/1
10 TABLET ORAL EVERY 4 HOURS PRN
Status: DISCONTINUED | OUTPATIENT
Start: 2023-07-17 | End: 2023-07-18 | Stop reason: HOSPADM

## 2023-07-17 RX ORDER — GLYCOPYRROLATE 0.2 MG/ML
INJECTION INTRAMUSCULAR; INTRAVENOUS PRN
Status: DISCONTINUED | OUTPATIENT
Start: 2023-07-17 | End: 2023-07-17 | Stop reason: SDUPTHER

## 2023-07-17 RX ORDER — FINASTERIDE 5 MG/1
5 TABLET, FILM COATED ORAL DAILY
Status: DISCONTINUED | OUTPATIENT
Start: 2023-07-17 | End: 2023-07-18 | Stop reason: HOSPADM

## 2023-07-17 RX ORDER — ATORVASTATIN CALCIUM 20 MG/1
20 TABLET, FILM COATED ORAL DAILY
Status: DISCONTINUED | OUTPATIENT
Start: 2023-07-17 | End: 2023-07-18 | Stop reason: HOSPADM

## 2023-07-17 RX ORDER — SODIUM CHLORIDE, SODIUM LACTATE, POTASSIUM CHLORIDE, CALCIUM CHLORIDE 600; 310; 30; 20 MG/100ML; MG/100ML; MG/100ML; MG/100ML
INJECTION, SOLUTION INTRAVENOUS CONTINUOUS
Status: ACTIVE | OUTPATIENT
Start: 2023-07-17 | End: 2023-07-18

## 2023-07-17 RX ORDER — GABAPENTIN 100 MG/1
100 CAPSULE ORAL ONCE
Status: COMPLETED | OUTPATIENT
Start: 2023-07-17 | End: 2023-07-17

## 2023-07-17 RX ORDER — SODIUM CHLORIDE 0.9 % (FLUSH) 0.9 %
5-40 SYRINGE (ML) INJECTION PRN
Status: DISCONTINUED | OUTPATIENT
Start: 2023-07-17 | End: 2023-07-18 | Stop reason: HOSPADM

## 2023-07-17 RX ORDER — SODIUM CHLORIDE 9 MG/ML
INJECTION, SOLUTION INTRAVENOUS PRN
Status: DISCONTINUED | OUTPATIENT
Start: 2023-07-17 | End: 2023-07-17 | Stop reason: HOSPADM

## 2023-07-17 RX ORDER — SODIUM CHLORIDE 0.9 % (FLUSH) 0.9 %
5-40 SYRINGE (ML) INJECTION PRN
Status: DISCONTINUED | OUTPATIENT
Start: 2023-07-17 | End: 2023-07-17 | Stop reason: HOSPADM

## 2023-07-17 RX ORDER — TAMSULOSIN HYDROCHLORIDE 0.4 MG/1
0.4 CAPSULE ORAL DAILY
Status: DISCONTINUED | OUTPATIENT
Start: 2023-07-17 | End: 2023-07-18 | Stop reason: HOSPADM

## 2023-07-17 RX ORDER — LOSARTAN POTASSIUM 25 MG/1
25 TABLET ORAL DAILY
Status: DISCONTINUED | OUTPATIENT
Start: 2023-07-18 | End: 2023-07-18 | Stop reason: HOSPADM

## 2023-07-17 RX ORDER — ACETAMINOPHEN 500 MG
1000 TABLET ORAL ONCE
Status: COMPLETED | OUTPATIENT
Start: 2023-07-17 | End: 2023-07-17

## 2023-07-17 RX ORDER — SCOLOPAMINE TRANSDERMAL SYSTEM 1 MG/1
1 PATCH, EXTENDED RELEASE TRANSDERMAL ONCE
Status: DISCONTINUED | OUTPATIENT
Start: 2023-07-17 | End: 2023-07-18 | Stop reason: HOSPADM

## 2023-07-17 RX ORDER — TIZANIDINE 4 MG/1
2 TABLET ORAL EVERY 6 HOURS PRN
Status: DISCONTINUED | OUTPATIENT
Start: 2023-07-17 | End: 2023-07-18 | Stop reason: HOSPADM

## 2023-07-17 RX ORDER — DIPHENHYDRAMINE HYDROCHLORIDE 50 MG/ML
12.5 INJECTION INTRAMUSCULAR; INTRAVENOUS
Status: DISCONTINUED | OUTPATIENT
Start: 2023-07-17 | End: 2023-07-17 | Stop reason: HOSPADM

## 2023-07-17 RX ORDER — ONDANSETRON 2 MG/ML
4 INJECTION INTRAMUSCULAR; INTRAVENOUS
Status: DISCONTINUED | OUTPATIENT
Start: 2023-07-17 | End: 2023-07-17 | Stop reason: HOSPADM

## 2023-07-17 RX ORDER — OXYCODONE HYDROCHLORIDE 5 MG/1
5 TABLET ORAL EVERY 4 HOURS PRN
Status: DISCONTINUED | OUTPATIENT
Start: 2023-07-17 | End: 2023-07-18 | Stop reason: HOSPADM

## 2023-07-17 RX ORDER — ONDANSETRON 2 MG/ML
4 INJECTION INTRAMUSCULAR; INTRAVENOUS EVERY 6 HOURS PRN
Status: DISCONTINUED | OUTPATIENT
Start: 2023-07-17 | End: 2023-07-18 | Stop reason: HOSPADM

## 2023-07-17 RX ORDER — SODIUM CHLORIDE 0.9 % (FLUSH) 0.9 %
5-40 SYRINGE (ML) INJECTION EVERY 12 HOURS SCHEDULED
Status: DISCONTINUED | OUTPATIENT
Start: 2023-07-17 | End: 2023-07-18 | Stop reason: HOSPADM

## 2023-07-17 RX ORDER — PROPOFOL 10 MG/ML
INJECTION, EMULSION INTRAVENOUS CONTINUOUS PRN
Status: DISCONTINUED | OUTPATIENT
Start: 2023-07-17 | End: 2023-07-17 | Stop reason: SDUPTHER

## 2023-07-17 RX ORDER — TRANEXAMIC ACID 100 MG/ML
INJECTION, SOLUTION INTRAVENOUS PRN
Status: DISCONTINUED | OUTPATIENT
Start: 2023-07-17 | End: 2023-07-17 | Stop reason: SDUPTHER

## 2023-07-17 RX ORDER — SODIUM CHLORIDE 0.9 % (FLUSH) 0.9 %
5-40 SYRINGE (ML) INJECTION EVERY 12 HOURS SCHEDULED
Status: DISCONTINUED | OUTPATIENT
Start: 2023-07-17 | End: 2023-07-17 | Stop reason: HOSPADM

## 2023-07-17 RX ORDER — OXYCODONE HYDROCHLORIDE 5 MG/1
5 TABLET ORAL
Status: DISCONTINUED | OUTPATIENT
Start: 2023-07-17 | End: 2023-07-17 | Stop reason: HOSPADM

## 2023-07-17 RX ORDER — MAGNESIUM HYDROXIDE 1200 MG/15ML
LIQUID ORAL CONTINUOUS PRN
Status: DISCONTINUED | OUTPATIENT
Start: 2023-07-17 | End: 2023-07-17 | Stop reason: HOSPADM

## 2023-07-17 RX ORDER — SENNA AND DOCUSATE SODIUM 50; 8.6 MG/1; MG/1
1 TABLET, FILM COATED ORAL 2 TIMES DAILY
Status: DISCONTINUED | OUTPATIENT
Start: 2023-07-17 | End: 2023-07-18 | Stop reason: HOSPADM

## 2023-07-17 RX ORDER — METOPROLOL SUCCINATE 25 MG/1
25 TABLET, EXTENDED RELEASE ORAL NIGHTLY
Status: DISCONTINUED | OUTPATIENT
Start: 2023-07-17 | End: 2023-07-18 | Stop reason: HOSPADM

## 2023-07-17 RX ORDER — ACETAMINOPHEN 325 MG/1
650 TABLET ORAL EVERY 6 HOURS
Status: DISCONTINUED | OUTPATIENT
Start: 2023-07-17 | End: 2023-07-18 | Stop reason: HOSPADM

## 2023-07-17 RX ORDER — HYDROMORPHONE HYDROCHLORIDE 1 MG/ML
0.5 INJECTION, SOLUTION INTRAMUSCULAR; INTRAVENOUS; SUBCUTANEOUS
Status: DISCONTINUED | OUTPATIENT
Start: 2023-07-17 | End: 2023-07-18 | Stop reason: HOSPADM

## 2023-07-17 RX ORDER — HYDROMORPHONE HYDROCHLORIDE 1 MG/ML
0.25 INJECTION, SOLUTION INTRAMUSCULAR; INTRAVENOUS; SUBCUTANEOUS
Status: DISCONTINUED | OUTPATIENT
Start: 2023-07-17 | End: 2023-07-18 | Stop reason: HOSPADM

## 2023-07-17 RX ORDER — ASPIRIN 81 MG/1
81 TABLET ORAL 2 TIMES DAILY
Status: DISCONTINUED | OUTPATIENT
Start: 2023-07-17 | End: 2023-07-18 | Stop reason: HOSPADM

## 2023-07-17 RX ADMIN — SODIUM CHLORIDE, POTASSIUM CHLORIDE, SODIUM LACTATE AND CALCIUM CHLORIDE: 600; 310; 30; 20 INJECTION, SOLUTION INTRAVENOUS at 12:27

## 2023-07-17 RX ADMIN — CEFAZOLIN 2000 MG: 2 INJECTION, POWDER, FOR SOLUTION INTRAMUSCULAR; INTRAVENOUS at 18:03

## 2023-07-17 RX ADMIN — METOPROLOL SUCCINATE 25 MG: 25 TABLET, EXTENDED RELEASE ORAL at 21:20

## 2023-07-17 RX ADMIN — ATORVASTATIN CALCIUM 20 MG: 20 TABLET, FILM COATED ORAL at 21:20

## 2023-07-17 RX ADMIN — TAMSULOSIN HYDROCHLORIDE 0.4 MG: 0.4 CAPSULE ORAL at 17:01

## 2023-07-17 RX ADMIN — OXYCODONE HYDROCHLORIDE 5 MG: 5 TABLET ORAL at 21:19

## 2023-07-17 RX ADMIN — FINASTERIDE 5 MG: 5 TABLET, FILM COATED ORAL at 17:01

## 2023-07-17 RX ADMIN — ASPIRIN 81 MG: 81 TABLET, COATED ORAL at 21:20

## 2023-07-17 RX ADMIN — SODIUM CHLORIDE, POTASSIUM CHLORIDE, SODIUM LACTATE AND CALCIUM CHLORIDE: 600; 310; 30; 20 INJECTION, SOLUTION INTRAVENOUS at 11:06

## 2023-07-17 RX ADMIN — OXYCODONE HYDROCHLORIDE 10 MG: 10 TABLET, FILM COATED, EXTENDED RELEASE ORAL at 08:48

## 2023-07-17 RX ADMIN — SENNOSIDES AND DOCUSATE SODIUM 1 TABLET: 50; 8.6 TABLET ORAL at 17:01

## 2023-07-17 RX ADMIN — SODIUM CHLORIDE, POTASSIUM CHLORIDE, SODIUM LACTATE AND CALCIUM CHLORIDE: 600; 310; 30; 20 INJECTION, SOLUTION INTRAVENOUS at 14:03

## 2023-07-17 RX ADMIN — ACETAMINOPHEN 1000 MG: 500 TABLET ORAL at 08:47

## 2023-07-17 RX ADMIN — TRANEXAMIC ACID 1000 MG: 1 INJECTION, SOLUTION INTRAVENOUS at 10:52

## 2023-07-17 RX ADMIN — GLYCOPYRROLATE 0.4 MG: 0.2 INJECTION INTRAMUSCULAR; INTRAVENOUS at 11:20

## 2023-07-17 RX ADMIN — CELECOXIB 200 MG: 200 CAPSULE ORAL at 08:48

## 2023-07-17 RX ADMIN — TRANEXAMIC ACID 1000 MG: 1 INJECTION, SOLUTION INTRAVENOUS at 11:50

## 2023-07-17 RX ADMIN — CEFAZOLIN 2000 MG: 2 INJECTION, POWDER, FOR SOLUTION INTRAMUSCULAR; INTRAVENOUS at 10:37

## 2023-07-17 RX ADMIN — SENNOSIDES AND DOCUSATE SODIUM 1 TABLET: 50; 8.6 TABLET ORAL at 21:20

## 2023-07-17 RX ADMIN — BUPIVACAINE HYDROCHLORIDE 10 MG: 5 INJECTION, SOLUTION EPIDURAL; INTRACAUDAL at 10:25

## 2023-07-17 RX ADMIN — ACETAMINOPHEN 650 MG: 325 TABLET ORAL at 18:01

## 2023-07-17 RX ADMIN — GABAPENTIN 100 MG: 100 CAPSULE ORAL at 08:48

## 2023-07-17 RX ADMIN — PROPOFOL 100 MCG/KG/MIN: 10 INJECTION, EMULSION INTRAVENOUS at 10:37

## 2023-07-17 RX ADMIN — MIDAZOLAM HYDROCHLORIDE 2 MG: 1 INJECTION, SOLUTION INTRAMUSCULAR; INTRAVENOUS at 10:37

## 2023-07-17 ASSESSMENT — PAIN DESCRIPTION - ORIENTATION: ORIENTATION: RIGHT

## 2023-07-17 ASSESSMENT — PAIN SCALES - GENERAL
PAINLEVEL_OUTOF10: 1
PAINLEVEL_OUTOF10: 0
PAINLEVEL_OUTOF10: 6
PAINLEVEL_OUTOF10: 4

## 2023-07-17 ASSESSMENT — PAIN - FUNCTIONAL ASSESSMENT: PAIN_FUNCTIONAL_ASSESSMENT: 0-10

## 2023-07-17 ASSESSMENT — PAIN DESCRIPTION - LOCATION: LOCATION: HIP;INCISION

## 2023-07-17 ASSESSMENT — PAIN DESCRIPTION - DESCRIPTORS: DESCRIPTORS: ACHING

## 2023-07-17 NOTE — ANESTHESIA PRE PROCEDURE
Department of Anesthesiology  Preprocedure Note       Name:  Anita Menard   Age:  66 y.o.  :  1945                                          MRN:  [id number]         Date:  2023      Surgeon: Shirlene Araujo):  Rigoberto Allen MD    Procedure: Procedure(s):  Right total hip arthroplasty - LATERAL,Mchenry Accolade Stem and Trident 2 cup System,Choice. ZACK    Medications prior to admission:   Prior to Admission medications    Medication Sig Start Date End Date Taking? Authorizing Provider   chlorhexidine (HIBICLENS) 4 % external liquid Apply topically daily for 3 days prior to surgery. 23   Manisha Mancini PA-C   Misc. Devices (BATH/SHOWER SEAT) MISC Dispense 1 Shower/Bath seat 23   Rigoberto Allen MD   Misc. Devices (CLASSICS ROLLING WALKER) MISC Dispense 1 rolling walker 23   Rigoberto Allen MD   Misc.  Devices (RAISED TOILET SEAT) MISC Dispense 1 raised toilet seat 23   Rigoberto Allen MD   finasteride (PROSCAR) 5 MG tablet Take 1 tablet by mouth daily 23   Skye Kinsey MD   [facility] (OSTEO BI-FLEX JOINT SHIELD PO) Take by mouth    Historical Provider, MD   Handicap Placard MISC by Does not apply route Good for 5 years 3/21/23   GIL Enciso - CNP   traZODone (DESYREL) 100 MG tablet Take 1 tablet by mouth nightly as needed for Sleep 3/21/23   GIL Enciso CNP   irbesartan (AVAPRO) 150 MG tablet TAKE 1 TABLET DAILY 3/8/23   Stephan J Holiday, DO   simvastatin (ZOCOR) 20 MG tablet TAKE 1 TABLET NIGHTLY 3/8/23   Stephan J Holiday, DO   metoprolol succinate (TOPROL XL) 25 MG extended release tablet TAKE 1 TABLET NIGHTLY 3/8/23   Stephan J Holiday, DO   PROLENSA 0.07 % SOLN  22   Historical Provider, MD   moxifloxacin (VIGAMOX) 0.5 % ophthalmic solution  22   Historical Provider, MD   prednisoLONE acetate (PRED FORTE) 1 % ophthalmic suspension  22   Historical Provider, MD   tiZANidine (ZANAFLEX) 2 MG tablet Take 1 tablet by mouth nightly as needed

## 2023-07-17 NOTE — PROGRESS NOTES
Physical Therapy Missed Treatment   Facility/Department: [facility] MED SURG I291/B072-96    NAME: Rodriguez Sanchez    : 1945 (31 y.o.)  MRN: [id number]    Account: [de-identified]  Gender: male      PT evaluation and treatment orders received. Chart reviewed. Pt post op CARLITOS. Per ortho op note, pt to have hip ABD orthosis and is WBAT in orthosis. Pt without orthosis at this time. Hold PT eval until orthosis obtained. Nursing staff notified. Will follow and attempt PT evaluation again at earliest availability.        Duyen Grove, PT, 23 at 2:38 PM

## 2023-07-17 NOTE — DISCHARGE INSTR - COC
Continuity of Care Form    Patient Name: Darian Gay   :  1945  MRN:  [id number]    Admit date:  (Not on file)  Discharge date:  ***    Code Status Order: No Order   Advance Directives:     Admitting Physician:  Connor Holland MD  PCP: GIL Smith CNP    Discharging Nurse: [facility] Unit/Room#: No information available for this encounter. Discharging Unit Phone Number: ***    Emergency Contact:   Extended Emergency Contact Information  Primary Emergency Contact: [facility] [address] [address] Phone: [phone]  Work Phone: [phone]  Mobile Phone: [phone]  Relation: Child    Past Surgical History:  Past Surgical History:   Procedure Laterality Date    CARPAL TUNNEL RELEASE Right 2017    [facility] MD    CARPAL TUNNEL RELEASE Left 2017    [facility] PEARL MD    COLONOSCOPY  2015    Dr. Roberto Gilliam      lower eyelids    [address] RESECTION  3/22/16    DR. BARRAGAN    OTHER SURGICAL HISTORY Left 2015    hydrocelectomy    TYMPANOPLASTY      At the [facility]       Immunization History:   Immunization History   Administered Date(s) Administered    COVID-19, PFIZER Bivalent, DO NOT Dilute, (age 12y+), IM, 30 mcg/0.3 mL 10/20/2022    COVID-19, PFIZER GRAY top, DO NOT Dilute, (age 15 y+), IM, 30 mcg/0.3 mL 2022    COVID-19, PFIZER PURPLE top, DILUTE for use, (age 15 y+), 30mcg/0.3mL 2021, 2021, 10/05/2021    Influenza A (X0S0-14) Vaccine PF IM 2009    Influenza Vaccine, unspecified formulation 2007, 2011, 11/10/2016    Influenza Virus Vaccine 2012, 10/08/2013, 2014, 2020    Influenza Whole 2014    Influenza, FLUAD, (age 72 y+), Adjuvanted, 0.5mL 10/01/2020, 2021, 10/01/2022    Influenza, FLUARIX, FLULAVAL, FLUZONE (age 10 mo+) AND AFLURIA, (age 1 y+), PF, 0.5mL 10/08/2013    Influenza, High Dose (Fluzone 65 yrs and older) After Discharge:   WBAT with posterolateral hip precautions avoiding figure of 4 to operative extremity   Aquacel dressing to be removed pod7 and incision left open to air  ASA 81 mg BID for 30 days for DVT prophylaxis   Follow up with surgeon in two weeks     Physician Certification: I certify the above information and transfer of Dary Space  is necessary for the continuing treatment of the diagnosis listed and that he requires {Admit to Appropriate Level of Care:94906} for {GREATER/LESS:512501059} 30 days.      Update Admission H&P: {CHP DME Changes in [facility]:[id number]}    PHYSICIAN SIGNATURE:  {Esignature:[id number]}

## 2023-07-17 NOTE — CARE COORDINATION
MET W/PT TO ASSESS NEEDS AND DISCUSS DISCHARGE PLAN WHICH IS HOME ALONE. HAS WALKER. INDEPENDENT AT BASELINE. PT RECOMMENDS [facility]. [address] OFFERED AND PT WOULD LIKE [address]. SECURED VM MESSAGE LEFT  [address]. SPOKE W/MARKY FROM [address] AND THEY WERE ABLE TO ACCEPT. WILL FOLLOW.

## 2023-07-17 NOTE — H&P
The history and physical in the electronic medical record dated 7/13/23 was personally reviewed. There are no interval changes that need to be made. Plan is to proceed with a right total hip as scheduled. The patient is opted to proceed with a hip replacement. I brought the model in. We sat down and we talked about the surgery. We talked about the recovery. I stressed to the patient the importance of exercise and participation in physical therapy in order to ensure a good outcome. We talked about the recovery. We went over the risks of surgery. Risks include, but are not limited to, infection, neurovascular injury, hematoma formation, wound healing complications, blood clots, intraoperative fracture, postoperative hip instability, limb length discrepancy, persistent postoperative pain and risks of anesthesia. The patient expressed understanding and wishes to proceed with a hip replacement as discussed above.

## 2023-07-17 NOTE — ANESTHESIA POSTPROCEDURE EVALUATION
Department of Anesthesiology  Postprocedure Note    Patient: Lester Garrett  MRN: [id number]  YOB: 1945  Date of evaluation: 7/17/2023      Procedure Summary     Date: 07/17/23 Room / Location: [address]    Anesthesia Start: 1037 Anesthesia Stop:     Procedure: Right total hip arthroplasty. (Right: Hip) Diagnosis:       Primary osteoarthritis of right hip      (Primary osteoarthritis of right hip [M16.11])    Surgeons: Gio Perez MD Responsible Provider: Dana Cisneros MD    Anesthesia Type: spinal ASA Status: 3          Anesthesia Type: No value filed.     Cristian Phase I: Cristian Score: 10    Cristian Phase II:        Anesthesia Post Evaluation    Patient location during evaluation: PACU  Patient participation: complete - patient participated  Level of consciousness: awake  Pain score: 0  Airway patency: patent  Nausea & Vomiting: no nausea  Complications: no  Cardiovascular status: hemodynamically stable  Respiratory status: face mask  Hydration status: euvolemic

## 2023-07-17 NOTE — OP NOTE
Operative Note      Patient: Georgette Aase  YOB: 1945  MRN: [id number]    Date of Procedure: 7/17/2023    Pre-Op Diagnosis Codes:     * Primary osteoarthritis of right hip [M16.11]    Post-Op Diagnosis:  Primary osteoarthritis of the right hip; chronic complete tear of the gluteus minimus and an approximate 80% tear of the gluteus medius of the right hip       Procedure(s):  Right total hip arthroplasty. Also a repair of the chronic tear of gluteus minimus and gluteus medius    Surgeon(s):  Margot Whitaker MD    Assistant:   First Assistant: Kathi Renee  Physician Assistant: Nery Salazar PA-C -his assistance consisted of assistance with positioning the limb, retraction and closing the wound    Anesthesia: Choice    Estimated Blood Loss (mL): less than 892     Complications: None    Specimens:   * No specimens in log *    Implants:  Implant Name Type Inv. Item Serial No.  Lot No. LRB No. Used Action   ANCHOR SUT L14.7MM DIA5. 5MM BIOCOMPOSITE FULL THRD W/ 1.3MM - [id number]  ANCHOR SUT L14.7MM DIA5. 5MM BIOCOMPOSITE FULL THRD W/ 1.3MM  [employer] [id number] Right 1 Implanted   ANCHOR SUT L14.7MM DIA5. 5MM BIOCOMPOSITE FULL THRD W/ 1.3MM - [id number]  ANCHOR SUT L14.7MM DIA5. 5MM BIOCOMPOSITE FULL THRD W/ 1.3MM  [employer] [id number] Right 1 Implanted   INSERT ACET F 0 DEG 36 MM HIP X3 TRIDENT - [id number]  INSERT ACET F 0 DEG 36 MM HIP X3 TRIDENT  [employer] [facility] [id number] Right 1 Implanted   SHELL ACET SZ F [id number] 5 CLUS H TRITANIUM PRESSFIT TREMAINE - [id number]  SHELL ACET SZ F [id number] 5 CLUS H TRITANIUM PRESSFIT TREMAINE  [employer] [facility] [id number] Right 1 Implanted   SCREW BNE L25MM DIA6. 5MM HEX LO PROF TRIDENT II - [id number]  SCREW BNE L25MM DIA6. 5MM HEX LO PROF TRIDENT II  [employer] [facility] [id number] Right 1 Implanted   HEAD FEM [id number] -2.5MM OFFSET HIP BIOLOX DELT CERAMIC TAPR - [id number]  HEAD FEM [id number] -2.5MM OFFSET HIP BIOLOX DELT CERAMIC TAPR into the greater trochanter. The awl was impacted in, and the anchors were then threaded in. The sutures, a total of 4 of them, 2 in each anchor, were passed from deep to superficial through the gluteus minimus and up through the gluteus medius. The sutures were then tied down to the suture anchors. An excellent repair with good tension of the muscle was achieved. At this point in time, in order to reinforce the anchors, drill holes were made in the greater trochanter from a posterior to anterior direction. A #5 Ethibond suture was then passed through bony tunnels and they were tied down in figure-of-eight fashion. This further reinforced the good repair. Utilizing a #1 Ethibond, accp-mv-dgad repair was performed where possible, and the split in the abductors was repaired in running suture fashion. The hip was taken through range of motion, and the abductor musculature stayed intact. It did not pull away. There was no gapping of the repair. The hip was copiously irrigated out with normal saline from the pulse lavage. Utilizing a #5 strata fix suture, the IT band was then closed in running fashion. The remainder the incision was closed in layers. The deep dermal layer was closed with a running 1 Vicryl suture. Subcutaneous tissue was closed with a 2-0 Vicryl suture, and the skin was closed with a running 4-0 Monocryl. Skin glue was applied. Sterile dressing was applied patient was stable to the PACU. Postoperative management:  Patient will receive a hip abduction orthosis. He will be weightbearing as tolerated in the orthosis. He will receive twice daily aspirin for DVT prophylaxis. He will receive 24 hours of appropriate antibiotic coverage. He will receive appropriate pain medications.     Electronically signed by Sixto Carrasco MD on 7/17/2023 at 12:33 PM

## 2023-07-17 NOTE — ANESTHESIA PROCEDURE NOTES
Spinal Block    Patient location during procedure: pre-op  Reason for block: primary anesthetic  Staffing  Performed: anesthesiologist   Anesthesiologist: Tami Nogueira MD  Spinal Block  Patient position: sitting  Prep: Betadine  Patient monitoring: cardiac monitor, continuous pulse ox and frequent blood pressure checks  Approach: midline  Location: L3/L4  Provider prep: mask and sterile gloves  Local infiltration: lidocaine  Needle  Needle type:  Víctor   Needle gauge: 24 G  Assessment  Events: cerebrospinal fluid  Swirl obtained: Yes  CSF: clear  Attempts: 1  Hemodynamics: stable  Preanesthetic Checklist  Completed: patient identified, IV checked, site marked, risks and benefits discussed, surgical/procedural consents, equipment checked, pre-op evaluation, timeout performed, anesthesia consent given, oxygen available, monitors applied/VS acknowledged, fire risk safety assessment completed and verbalized and blood product R/B/A discussed and consented

## 2023-07-17 NOTE — CONSULTS
Hospital Medicine  History and Physical    Patient:  Nicolas Jones  MRN: [id number]    CHIEF COMPLAINT:  No chief complaint on file. History Obtained From:  Patient, EMR  Primary Care Physician: GIL Mathis CNP    HISTORY OF PRESENT ILLNESS:   The patient is a 66 y.o. male with PMH of CAD, HTN, HLD, OA who we were consulted for post op care. Patinet is doing well post op; denies fevers, chills, sweats, CP, SOB. Past Medical History:      Diagnosis Date    BPH (benign prostatic hypertrophy)     CAD (coronary artery disease)     Cerebrovascular disease     Chronic otitis externa 2/11/2016    Chronic suppurative otitis media 11/11/2015    Ear injury 2006    [address]    ED (erectile dysfunction) 11/28/2014    Hearing loss     History of nephrolithiasis     History of pneumonia 2004    History of prediabetes     Hyperlipidemia 10/10/2013    Hypertension     Hypogonadism in male     Mass of chest wall, left     Optic neuropathy, ischemic     Osteoarthritis of both hips     Paresthesias     Perforation of right tympanic membrane     chronic; Dr. Melissa Moreno PSA (prostate specific antigen) 2007- 1.12    Seborrheic keratosis 10/10/2013    Sensorineural hearing loss, bilateral     Stable angina (HCC)     Trigger thumb, left thumb        Past Surgical History:      Procedure Laterality Date    CARPAL TUNNEL RELEASE Right 05/30/2017    [facility] MD    CARPAL TUNNEL RELEASE Left 06/2017    [facility] MD    COLONOSCOPY  6/2015    Dr. Gudelia Dunaway      lower eyelids    [address] RESECTION  3/22/16    DR. BARRAGAN    OTHER SURGICAL HISTORY Left 1/2015    hydrocelectomy    TYMPANOPLASTY      At the [facility]       Medications Prior to Admission:    Prior to Admission medications    Medication Sig Start Date End Date Taking?  Authorizing Provider   chlorhexidine (HIBICLENS) 4 % external liquid Apply topically daily for 3 days prior to

## 2023-07-17 NOTE — DISCHARGE INSTRUCTIONS
Dr. Carin Mancia MD  [facility]    Post Operative Instructions for Total Hip Replacement    Incision and dressing  Your incision is covered with a waterproof Aquacel dressing. It has been impregnated with silver nitrate to help knowles off infection. The dressing is to be removed 7 days after the date of your surgery. The incision has been closed with skin glue. The glue will flake off over time and fall off on its own. DO NOT apply any lotions, creams, peroxide or Betadine to the skin glue, as it will degrade and dissolve the glue. DO NOT peel the glue off, as this could result in opening of the incision. There are no sutures that need to be removed; the skin and subcutaneous layers have been closed with dissolvable sutures, which will dissolve over 7 to 8 weeks. Showering  The Aquacel dressing is waterproof. You may shower when you feel ready. Once the Aquacel dressing has been removed, you may continue to shower. The glue provides a waterproof seal to the incision. Let the water run over the incision, and pat dry with a towel. Do not scrub or rub the glue. Compression stockings  The compression stockings/HUMZA hose are used to help reduce swelling and assist in the prevention of blood clots. They are to be worn on the operative leg for 3 weeks. They should be worn full-time during the day, but may be removed at nighttime or during periods of elevation during the day. They are optional on the nonoperative leg, depending on how much swelling may be encountered. Activity  If your incision is near your groin (anterior approach) you have no restrictions other than to avoid a figure-of-four position with the operative leg crossed over the nonoperative leg.   If your incision is on the side/back of your hip (lateral approach) you will have hip precautions, meaning no bending past 90 degrees, no crossing your legs, no sleeping on your operative side, and it is recommended to use a

## 2023-07-18 ENCOUNTER — HOSPITAL ENCOUNTER (INPATIENT)
Age: 78
LOS: 8 days | Discharge: HOME | DRG: 560 | End: 2023-07-26
Attending: PHYSICAL MEDICINE & REHABILITATION | Admitting: PHYSICAL MEDICINE & REHABILITATION
Payer: MEDICARE

## 2023-07-18 VITALS
SYSTOLIC BLOOD PRESSURE: 123 MMHG | WEIGHT: 238 LBS | DIASTOLIC BLOOD PRESSURE: 65 MMHG | HEART RATE: 73 BPM | HEIGHT: 69 IN | TEMPERATURE: 98.1 F | OXYGEN SATURATION: 97 % | BODY MASS INDEX: 35.25 KG/M2 | RESPIRATION RATE: 16 BRPM

## 2023-07-18 DIAGNOSIS — Z74.09 IMPAIRED MOBILITY AND ACTIVITIES OF DAILY LIVING: ICD-10-CM

## 2023-07-18 DIAGNOSIS — Z78.9 IMPAIRED MOBILITY AND ACTIVITIES OF DAILY LIVING: ICD-10-CM

## 2023-07-18 DIAGNOSIS — Z96.641 STATUS POST TOTAL HIP REPLACEMENT, RIGHT: ICD-10-CM

## 2023-07-18 DIAGNOSIS — G89.18 POST-OP PAIN: Primary | ICD-10-CM

## 2023-07-18 PROBLEM — M76.01 GLUTEAL TENDINITIS OF RIGHT BUTTOCK: Status: ACTIVE | Noted: 2023-07-18

## 2023-07-18 LAB
ANION GAP SERPL CALCULATED.3IONS-SCNC: 7 MEQ/L (ref 9–15)
BUN SERPL-MCNC: 18 MG/DL (ref 8–23)
CALCIUM SERPL-MCNC: 8.3 MG/DL (ref 8.5–9.9)
CHLORIDE SERPL-SCNC: 100 MEQ/L (ref 95–107)
CO2 SERPL-SCNC: 26 MEQ/L (ref 20–31)
CREAT SERPL-MCNC: 0.89 MG/DL (ref 0.7–1.2)
ERYTHROCYTE [DISTWIDTH] IN BLOOD BY AUTOMATED COUNT: 13.9 % (ref 11.5–14.5)
GLUCOSE SERPL-MCNC: 125 MG/DL (ref 70–99)
HCT VFR BLD AUTO: 41.1 % (ref 42–52)
HGB BLD-MCNC: 13.8 G/DL (ref 14–18)
MCH RBC QN AUTO: 30.2 PG (ref 27–31.3)
MCHC RBC AUTO-ENTMCNC: 33.7 % (ref 33–37)
MCV RBC AUTO: 89.7 FL (ref 79–92.2)
PLATELET # BLD AUTO: 162 K/UL (ref 130–400)
POTASSIUM SERPL-SCNC: 4.1 MEQ/L (ref 3.4–4.9)
RBC # BLD AUTO: 4.58 M/UL (ref 4.7–6.1)
SARS-COV-2 RDRP RESP QL NAA+PROBE: NOT DETECTED
SODIUM SERPL-SCNC: 133 MEQ/L (ref 135–144)
WBC # BLD AUTO: 13 K/UL (ref 4.8–10.8)

## 2023-07-18 PROCEDURE — 6360000002 HC RX W HCPCS: Performed by: NURSE PRACTITIONER

## 2023-07-18 PROCEDURE — 99221 1ST HOSP IP/OBS SF/LOW 40: CPT | Performed by: PHYSICAL MEDICINE & REHABILITATION

## 2023-07-18 PROCEDURE — 97535 SELF CARE MNGMENT TRAINING: CPT

## 2023-07-18 PROCEDURE — G0378 HOSPITAL OBSERVATION PER HR: HCPCS

## 2023-07-18 PROCEDURE — 85027 COMPLETE CBC AUTOMATED: CPT

## 2023-07-18 PROCEDURE — 6370000000 HC RX 637 (ALT 250 FOR IP): Performed by: NURSE PRACTITIONER

## 2023-07-18 PROCEDURE — 97110 THERAPEUTIC EXERCISES: CPT

## 2023-07-18 PROCEDURE — 97166 OT EVAL MOD COMPLEX 45 MIN: CPT

## 2023-07-18 PROCEDURE — 1180000000 HC REHAB R&B

## 2023-07-18 PROCEDURE — 2580000003 HC RX 258: Performed by: NURSE PRACTITIONER

## 2023-07-18 PROCEDURE — 6370000000 HC RX 637 (ALT 250 FOR IP): Performed by: INTERNAL MEDICINE

## 2023-07-18 PROCEDURE — 80048 BASIC METABOLIC PNL TOTAL CA: CPT

## 2023-07-18 PROCEDURE — 36415 COLL VENOUS BLD VENIPUNCTURE: CPT

## 2023-07-18 PROCEDURE — 97162 PT EVAL MOD COMPLEX 30 MIN: CPT

## 2023-07-18 PROCEDURE — 87635 SARS-COV-2 COVID-19 AMP PRB: CPT

## 2023-07-18 PROCEDURE — 2700000000 HC OXYGEN THERAPY PER DAY

## 2023-07-18 PROCEDURE — 97116 GAIT TRAINING THERAPY: CPT

## 2023-07-18 RX ORDER — FINASTERIDE 5 MG/1
5 TABLET, FILM COATED ORAL DAILY
Status: CANCELLED | OUTPATIENT
Start: 2023-07-19

## 2023-07-18 RX ORDER — SODIUM CHLORIDE 9 MG/ML
INJECTION, SOLUTION INTRAVENOUS PRN
Status: DISCONTINUED | OUTPATIENT
Start: 2023-07-18 | End: 2023-07-26 | Stop reason: HOSPADM

## 2023-07-18 RX ORDER — OXYCODONE HYDROCHLORIDE 5 MG/1
5 CAPSULE ORAL EVERY 4 HOURS PRN
Status: DISCONTINUED | OUTPATIENT
Start: 2023-07-18 | End: 2023-07-26 | Stop reason: HOSPADM

## 2023-07-18 RX ORDER — ATORVASTATIN CALCIUM 20 MG/1
20 TABLET, FILM COATED ORAL DAILY
Status: CANCELLED | OUTPATIENT
Start: 2023-07-18

## 2023-07-18 RX ORDER — ASPIRIN 81 MG/1
81 TABLET ORAL 2 TIMES DAILY
Status: CANCELLED | OUTPATIENT
Start: 2023-07-18

## 2023-07-18 RX ORDER — TAMSULOSIN HYDROCHLORIDE 0.4 MG/1
0.4 CAPSULE ORAL DAILY
Status: DISCONTINUED | OUTPATIENT
Start: 2023-07-19 | End: 2023-07-19

## 2023-07-18 RX ORDER — ONDANSETRON 2 MG/ML
4 INJECTION INTRAMUSCULAR; INTRAVENOUS EVERY 6 HOURS PRN
Status: DISCONTINUED | OUTPATIENT
Start: 2023-07-18 | End: 2023-07-26 | Stop reason: HOSPADM

## 2023-07-18 RX ORDER — LOSARTAN POTASSIUM 25 MG/1
25 TABLET ORAL DAILY
Status: DISCONTINUED | OUTPATIENT
Start: 2023-07-19 | End: 2023-07-26 | Stop reason: HOSPADM

## 2023-07-18 RX ORDER — POLYETHYLENE GLYCOL 3350 17 G/17G
17 POWDER, FOR SOLUTION ORAL DAILY PRN
Status: DISCONTINUED | OUTPATIENT
Start: 2023-07-18 | End: 2023-07-26 | Stop reason: HOSPADM

## 2023-07-18 RX ORDER — ASPIRIN 81 MG/1
81 TABLET ORAL 2 TIMES DAILY
Qty: 60 TABLET | Refills: 0 | Status: ON HOLD | OUTPATIENT
Start: 2023-07-18 | End: 2023-07-25 | Stop reason: SDUPTHER

## 2023-07-18 RX ORDER — SENNA AND DOCUSATE SODIUM 50; 8.6 MG/1; MG/1
1 TABLET, FILM COATED ORAL 2 TIMES DAILY
Qty: 20 TABLET | Refills: 0 | Status: SHIPPED | OUTPATIENT
Start: 2023-07-18 | End: 2023-07-28

## 2023-07-18 RX ORDER — METOPROLOL SUCCINATE 25 MG/1
25 TABLET, EXTENDED RELEASE ORAL NIGHTLY
Status: CANCELLED | OUTPATIENT
Start: 2023-07-18

## 2023-07-18 RX ORDER — ACETAMINOPHEN 325 MG/1
650 TABLET ORAL EVERY 6 HOURS
Status: DISCONTINUED | OUTPATIENT
Start: 2023-07-19 | End: 2023-07-26 | Stop reason: HOSPADM

## 2023-07-18 RX ORDER — TAMSULOSIN HYDROCHLORIDE 0.4 MG/1
0.4 CAPSULE ORAL DAILY
Status: CANCELLED | OUTPATIENT
Start: 2023-07-19

## 2023-07-18 RX ORDER — ONDANSETRON 4 MG/1
4 TABLET, ORALLY DISINTEGRATING ORAL EVERY 8 HOURS PRN
Status: DISCONTINUED | OUTPATIENT
Start: 2023-07-18 | End: 2023-07-26 | Stop reason: HOSPADM

## 2023-07-18 RX ORDER — LOSARTAN POTASSIUM 25 MG/1
25 TABLET ORAL DAILY
Status: CANCELLED | OUTPATIENT
Start: 2023-07-19

## 2023-07-18 RX ORDER — FINASTERIDE 5 MG/1
5 TABLET, FILM COATED ORAL DAILY
Status: DISCONTINUED | OUTPATIENT
Start: 2023-07-19 | End: 2023-07-26 | Stop reason: HOSPADM

## 2023-07-18 RX ORDER — SENNA AND DOCUSATE SODIUM 50; 8.6 MG/1; MG/1
1 TABLET, FILM COATED ORAL 2 TIMES DAILY
Status: CANCELLED | OUTPATIENT
Start: 2023-07-18

## 2023-07-18 RX ORDER — SODIUM CHLORIDE 9 MG/ML
INJECTION, SOLUTION INTRAVENOUS PRN
Status: CANCELLED | OUTPATIENT
Start: 2023-07-18

## 2023-07-18 RX ORDER — POLYETHYLENE GLYCOL 3350 17 G/17G
17 POWDER, FOR SOLUTION ORAL DAILY PRN
Status: CANCELLED | OUTPATIENT
Start: 2023-07-18

## 2023-07-18 RX ORDER — OXYCODONE HYDROCHLORIDE 5 MG/1
10 CAPSULE ORAL EVERY 4 HOURS PRN
Status: DISCONTINUED | OUTPATIENT
Start: 2023-07-18 | End: 2023-07-26 | Stop reason: HOSPADM

## 2023-07-18 RX ORDER — METOPROLOL SUCCINATE 25 MG/1
25 TABLET, EXTENDED RELEASE ORAL NIGHTLY
Status: DISCONTINUED | OUTPATIENT
Start: 2023-07-18 | End: 2023-07-26 | Stop reason: HOSPADM

## 2023-07-18 RX ORDER — ONDANSETRON 2 MG/ML
4 INJECTION INTRAMUSCULAR; INTRAVENOUS EVERY 6 HOURS PRN
Status: CANCELLED | OUTPATIENT
Start: 2023-07-18

## 2023-07-18 RX ORDER — ASPIRIN 81 MG/1
81 TABLET ORAL 2 TIMES DAILY
Status: DISCONTINUED | OUTPATIENT
Start: 2023-07-18 | End: 2023-07-26 | Stop reason: HOSPADM

## 2023-07-18 RX ORDER — OXYCODONE HYDROCHLORIDE 5 MG/1
5 TABLET ORAL EVERY 4 HOURS PRN
Status: CANCELLED | OUTPATIENT
Start: 2023-07-18

## 2023-07-18 RX ORDER — ACETAMINOPHEN 325 MG/1
650 TABLET ORAL EVERY 6 HOURS
Status: CANCELLED | OUTPATIENT
Start: 2023-07-18

## 2023-07-18 RX ORDER — ATORVASTATIN CALCIUM 20 MG/1
20 TABLET, FILM COATED ORAL
Status: DISCONTINUED | OUTPATIENT
Start: 2023-07-18 | End: 2023-07-26 | Stop reason: HOSPADM

## 2023-07-18 RX ORDER — SENNA AND DOCUSATE SODIUM 50; 8.6 MG/1; MG/1
1 TABLET, FILM COATED ORAL 2 TIMES DAILY
Status: DISCONTINUED | OUTPATIENT
Start: 2023-07-18 | End: 2023-07-26 | Stop reason: HOSPADM

## 2023-07-18 RX ORDER — OXYCODONE HYDROCHLORIDE 5 MG/1
5 TABLET ORAL EVERY 6 HOURS PRN
Qty: 28 TABLET | Refills: 0 | Status: ON HOLD | OUTPATIENT
Start: 2023-07-18 | End: 2023-07-26 | Stop reason: HOSPADM

## 2023-07-18 RX ORDER — OXYCODONE HYDROCHLORIDE 5 MG/1
10 TABLET ORAL EVERY 4 HOURS PRN
Status: CANCELLED | OUTPATIENT
Start: 2023-07-18

## 2023-07-18 RX ORDER — ONDANSETRON 4 MG/1
4 TABLET, ORALLY DISINTEGRATING ORAL EVERY 8 HOURS PRN
Status: CANCELLED | OUTPATIENT
Start: 2023-07-18

## 2023-07-18 RX ADMIN — SENNOSIDES AND DOCUSATE SODIUM 1 TABLET: 50; 8.6 TABLET ORAL at 09:57

## 2023-07-18 RX ADMIN — TAMSULOSIN HYDROCHLORIDE 0.4 MG: 0.4 CAPSULE ORAL at 09:56

## 2023-07-18 RX ADMIN — ACETAMINOPHEN 650 MG: 325 TABLET ORAL at 00:09

## 2023-07-18 RX ADMIN — SENNOSIDES AND DOCUSATE SODIUM 1 TABLET: 50; 8.6 TABLET ORAL at 21:43

## 2023-07-18 RX ADMIN — METOPROLOL SUCCINATE 25 MG: 25 TABLET, EXTENDED RELEASE ORAL at 21:43

## 2023-07-18 RX ADMIN — OXYCODONE HYDROCHLORIDE 5 MG: 5 TABLET ORAL at 14:58

## 2023-07-18 RX ADMIN — ATORVASTATIN CALCIUM 20 MG: 20 TABLET, FILM COATED ORAL at 21:43

## 2023-07-18 RX ADMIN — LOSARTAN POTASSIUM 25 MG: 25 TABLET, FILM COATED ORAL at 09:57

## 2023-07-18 RX ADMIN — OXYCODONE HYDROCHLORIDE 5 MG: 5 CAPSULE ORAL at 21:43

## 2023-07-18 RX ADMIN — ASPIRIN 81 MG: 81 TABLET, COATED ORAL at 21:43

## 2023-07-18 RX ADMIN — ASPIRIN 81 MG: 81 TABLET, COATED ORAL at 09:56

## 2023-07-18 RX ADMIN — CEFAZOLIN 2000 MG: 2 INJECTION, POWDER, FOR SOLUTION INTRAMUSCULAR; INTRAVENOUS at 02:03

## 2023-07-18 RX ADMIN — FINASTERIDE 5 MG: 5 TABLET, FILM COATED ORAL at 09:56

## 2023-07-18 RX ADMIN — ACETAMINOPHEN 650 MG: 325 TABLET ORAL at 05:53

## 2023-07-18 ASSESSMENT — ENCOUNTER SYMPTOMS
EYE REDNESS: 0
SHORTNESS OF BREATH: 1
DIARRHEA: 0
BACK PAIN: 1
CONSTIPATION: 1
BLOOD IN STOOL: 0
ABDOMINAL PAIN: 0
NAUSEA: 0
STRIDOR: 0
VOMITING: 0
COUGH: 0
SORE THROAT: 0
EYE PAIN: 0
PHOTOPHOBIA: 0
WHEEZING: 0

## 2023-07-18 ASSESSMENT — PAIN SCALES - GENERAL
PAINLEVEL_OUTOF10: 6
PAINLEVEL_OUTOF10: 4
PAINLEVEL_OUTOF10: 3
PAINLEVEL_OUTOF10: 6
PAINLEVEL_OUTOF10: 3
PAINLEVEL_OUTOF10: 0

## 2023-07-18 ASSESSMENT — PAIN DESCRIPTION - ORIENTATION
ORIENTATION: RIGHT
ORIENTATION: RIGHT

## 2023-07-18 ASSESSMENT — PAIN DESCRIPTION - LOCATION
LOCATION: HIP;INCISION
LOCATION: HIP;INCISION

## 2023-07-18 ASSESSMENT — PAIN DESCRIPTION - DESCRIPTORS: DESCRIPTORS: ACHING

## 2023-07-18 NOTE — PLAN OF CARE
Problem: Discharge Planning  Goal: Discharge to home or other facility with appropriate resources  Outcome: Progressing     Problem: Pain  Goal: Verbalizes/displays adequate comfort level or baseline comfort level  7/17/2023 2126 by Brandon Lim RN  Outcome: Progressing  7/17/2023 1558 by Miriam Gonzalez RN  Outcome: Progressing     Problem: Safety - Adult  Goal: Free from fall injury  7/17/2023 2126 by Brandon Lim RN  Outcome: Progressing  7/17/2023 1558 by Miriam Gonzalez RN  Outcome: Progressing     Problem: ABCDS Injury Assessment  Goal: Absence of physical injury  7/17/2023 2126 by Brandon Lim RN  Outcome: Progressing  7/17/2023 1558 by Miriam Gonzalez RN  Outcome: Progressing     Problem: Nutrition Deficit:  Goal: Optimize nutritional status  Outcome: Progressing     Problem: Skin/Tissue Integrity - Adult  Goal: Skin integrity remains intact  Outcome: Progressing  Goal: Incisions, wounds, or drain sites healing without S/S of infection  Outcome: Progressing  Goal: Oral mucous membranes remain intact  Outcome: Progressing     Problem: Musculoskeletal - Adult  Goal: Return mobility to safest level of function  Outcome: Progressing  Goal: Maintain proper alignment of affected body part  Outcome: Progressing  Goal: Return ADL status to a safe level of function  Outcome: Progressing     Problem: Infection - Adult  Goal: Absence of infection at discharge  Outcome: Progressing  Goal: Absence of infection during hospitalization  Outcome: Progressing  Goal: Absence of fever/infection during anticipated neutropenic period  Outcome: Progressing     Problem: Metabolic/Fluid and Electrolytes - Adult  Goal: Electrolytes maintained within normal limits  Outcome: Progressing  Goal: Hemodynamic stability and optimal renal function maintained  Outcome: Progressing  Goal: Glucose maintained within prescribed range  Outcome: Progressing     Problem: Hematologic - Adult  Goal: Maintains hematologic stability  Outcome: Progressing

## 2023-07-18 NOTE — CARE COORDINATION
RECEIVED UPDATE FROM [facility] FOR BED ASSIGNMENT. PT'S ROOM # 250, ACUTE REHAB. VM MESSAGE LEFT FOR [facility] INFORMING OF DISPOSITION.

## 2023-07-18 NOTE — PROGRESS NOTES
Pt confused. A&O x4 but hyperfocused on where his his white remote from home was and pointing to his phone saying \"this chair is the worst.\" Removing clothing, spilled water, and threw blankets onto the floor. Pt cleaned up and repositioned. Reoriented.

## 2023-07-18 NOTE — CARE COORDINATION
LSW DID NOT DO WELL WITH THERAPY TODAY AND HE HAS BEEN CONFUSED AND ANGRY WITH THE STAFF AS THEY TRY TO REDIRECT HIM. PT HAS A REHAB REFERRAL BUT HE IS INSISTING ON GOING HOME EVEN THOUGH HE IS NOT SAFE TO GO HOME AS HE IS TODAY. LSW CALLED PT'S SON TO COME IN TO SEE IF HE CAN CALM THE PT AND DISCUSS HIS DC NEEDS.

## 2023-07-18 NOTE — PROGRESS NOTES
Physical Therapy Med Surg Initial Assessment  Facility/Department: [facility]  [facility]       NAME: Romi Servin  : 1945 (22 y.o.)  MRN: [id number]  CODE STATUS: Full Code    Date of Service: 2023    Patient Diagnosis(es): Primary osteoarthritis of right hip [M16.11]  Status post total hip replacement, right [Z96.641]   No chief complaint on file.     Patient Active Problem List    Diagnosis Date Noted    Status post total hip replacement, right 2023    Morbidly obese ([address]) 2020    Osteoarthritis of both hips     Perforation of right tympanic membrane     Benign essential hypertension 2017    Trigger thumb, left thumb     Hypogonadism in male     Chronic otitis externa 2016    CAD (coronary artery disease)     Seborrheic keratoses     Sensorineural hearing loss, bilateral     Chronic suppurative otitis media 2015    ED (erectile dysfunction) 2014    Hyperlipidemia 10/10/2013    Hypertension     Benign prostatic hyperplasia with incomplete bladder emptying     Cerebrovascular disease         Past Medical History:   Diagnosis Date    BPH (benign prostatic hypertrophy)     CAD (coronary artery disease)     Cerebrovascular disease     Chronic otitis externa 2016    Chronic suppurative otitis media 2015    Ear injury 2006    [address]    ED (erectile dysfunction) 2014    Hearing loss     History of nephrolithiasis     History of pneumonia 2004    History of prediabetes     Hyperlipidemia 10/10/2013    Hypertension     Hypogonadism in male     Mass of chest wall, left     Optic neuropathy, ischemic     Osteoarthritis of both hips     Paresthesias     Perforation of right tympanic membrane     chronic; Dr. Miriam Cuevas PSA (prostate specific antigen) - 1.12    Seborrheic keratosis 10/10/2013    Sensorineural hearing loss, bilateral     Stable angina (HCC)     Trigger thumb, left thumb      Past Surgical History: 1: Pt to complete bed mobility with indep  Long Term Goal 2: Pt to complete transfers with indep  Long Term Goal 3: Pt to ambulate 50-150ft with LRD and indep  Long Term Goal 4: Pt to complete 3 steps with HR and SBA  Long Term Goal 5: Pt to complete HEP with indep    AMPAC (6 CLICK) BASIC MOBILITY  AM-PAC Inpatient Mobility Raw Score : 17     Therapy Time:   Individual   Time In 0910   Time Out 0934   Minutes 24   Timed Code Treatment Minutes: 8 Minutes (gait 6min; transfers 2min)       Floretta Lennox, PT, 07/18/23 at 10:02 AM         Definitions for assistance levels  Independent = pt does not require any physical supervision or assistance from another person for activity completion. Device may be needed.   Stand by assistance = pt requires verbal cues or instructions from another person, close to but not touching, to perform the activity  Minimal assistance= pt performs 75% or more of the activity; assistance is required to complete the activity  Moderate assistance= pt performs 50% of the activity; assistance is required to complete the activity  Maximal assistance = pt performs 25% of the activity; assistance is required to complete the activity  Dependent = pt requires total physical assistance to accomplish the task

## 2023-07-18 NOTE — PROGRESS NOTES
Physical Therapy Med Surg Daily Treatment Note  Facility/Department: [facility]  Room: E245/T718-54       NAME: Connie Garcia  : 1945 (19 y.o.)  MRN: [id number]  CODE STATUS: Full Code    Date of Service: 2023    Patient Diagnosis(es): Primary osteoarthritis of right hip [M16.11]  Status post total hip replacement, right [Z96.641]   No chief complaint on file.     Patient Active Problem List    Diagnosis Date Noted    Status post total hip replacement, right 2023    Morbidly obese ([address]) 2020    Osteoarthritis of both hips     Perforation of right tympanic membrane     Benign essential hypertension 2017    Trigger thumb, left thumb     Hypogonadism in male     Chronic otitis externa 2016    CAD (coronary artery disease)     Seborrheic keratoses     Sensorineural hearing loss, bilateral     Chronic suppurative otitis media 2015    ED (erectile dysfunction) 2014    Hyperlipidemia 10/10/2013    Hypertension     Benign prostatic hyperplasia with incomplete bladder emptying     Cerebrovascular disease         Past Medical History:   Diagnosis Date    BPH (benign prostatic hypertrophy)     CAD (coronary artery disease)     Cerebrovascular disease     Chronic otitis externa 2016    Chronic suppurative otitis media 2015    Ear injury 2006    [address]    ED (erectile dysfunction) 2014    Hearing loss     History of nephrolithiasis     History of pneumonia 2004    History of prediabetes     Hyperlipidemia 10/10/2013    Hypertension     Hypogonadism in male     Mass of chest wall, left     Optic neuropathy, ischemic     Osteoarthritis of both hips     Paresthesias     Perforation of right tympanic membrane     chronic; Dr. Fred Kumar PSA (prostate specific antigen) 2007- 1.12    Seborrheic keratosis 10/10/2013    Sensorineural hearing loss, bilateral     Stable angina (HCC)     Trigger thumb, left thumb      Past Surgical History:

## 2023-07-18 NOTE — CONSULTS
Physical Medicine & Rehabilitation  Consult Note      Admitting Physician: Camilo Carey MD    Primary Care Provider: GIL Collins - CNP     Reason for Consult:  Asses rehab needs, promote physical and mental function, analyze level of care to determine rehab needs, improve ability to actively participate in the rehabilitation process, and decrease likelihood of re-admit to the hospital after discharge. History of Present Illness:    Owen Grayson is a 66 y.o. male admitted to [facility] on 7/17/2023. And is recovering from:       Procedure Summary      Date: 07/17/23 Room / Location: [facility] OR [address] [place] [address]     Anesthesia Start: 9262 Anesthesia Stop:      Procedure: Right total hip arthroplasty. (Right: Hip)-Right total hip arthroplasty. 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 Hip Pain   The pain is at a severity of 10/10. I reviewed recent nursing notes discussed care with acute care providers, \" Patient continuously attempting to get out of bed or chair and moving back and forth from bed to chair constantly. Patient is aggressive with poor safety awareness and appears confused. Upon entering room patient will state that he just left home, trying to find his white television remote and stating that people are working on his fridge and he needs to go check on them. When asking orientation questions patient answers them appropriately. Bed and chair alarms are activated and call light at patients bedside which he will not use. Ina monitor requested and spoke with  who spoke with son to come to bedside.    \". patient. Current Rehabilitation Assessments:    Rehabilitation:  Physical Therapy  Bed mobility:  Bed mobility  Rolling to Left: Stand by assistance (07/18/23 1351)  Rolling to Right: Stand by assistance (07/18/23 1351)  Supine to Sit: Minimal assistance; Moderate assistance (07/18/23 1351)  Sit to Supine: Minimal assistance; Moderate assistance (07/18/23 1351)  Bed Mobility Comments: Bed flat with use of hand rails; increased time and effort to complete. Pt impulsively attempting to get out bed upon arrival, nurse and PCA in room to assist pt. BLE assisted into bed. (07/18/23 1351)  Transfers:  Transfers  Sit to Stand: Contact guard assistance (07/18/23 1354)  Stand to Sit: Contact guard assistance (07/18/23 1354)  Bed to Chair: Contact guard assistance (07/18/23 1354)  Comment: vc's for hand placement and technique with [employer]. Pt required frequent cuing for safety. Lifting assist required from lower surfaces. (07/18/23 1354)  Gait:   Ambulation  Surface: Level tile (07/18/23 1355)  Device: Rolling Walker (07/18/23 1355)  Other Apparatus:  (ABD brace) (07/18/23 1355)  Assistance: Contact guard assistance;Minimal assistance (07/18/23 1355)  Quality of Gait: Difficulty managing WW in tight spaces requiring constant cuing for safety. Once in pham pt ambulated with step-gait pattern with slow marlon; vc's to not  [employer]. Improved mgmt with turns (07/18/23 1355)  Gait Deviations: Decreased step length; Slow Marlon (07/18/23 1355)  Distance: 40'x2 (07/18/23 1355)  Comments: Pt urinating down the pham and he didn't say anything. (07/18/23 1355)  Overall Level of Assistance: Contact-guard assistance (07/18/23 1107)  Interventions: Safety awareness training;Demonstration;Verbal cues;Manual cues; Visual cues (07/18/23 1107)  Stairs:  Stairs/Curb  Stairs?: No (safety concerns) (07/18/23 1355)  W/C mobility:         Occupational therapy:   Hand Dominance: Right  ADL  Feeding: Independent (07/18/23 0920)  Grooming: Supervision

## 2023-07-18 NOTE — CARE COORDINATION
[facility] Pre-Admission Screening Document      Patient Name: Radha Day       MRN: [id number]    : 1945    Age: 66 y.o. Gender: male   Payor: Payor: MEDICARE / Plan: MEDICARE PART A AND B / Product Type: *No Product type* /   MSSP: No    Admitted from: [facility] Floor: 2W  Attending Care Provider: Pascual Rey MD  Inpatient Rehab Referring Care Provider: GIL Billy CNP   Primary Care Provider: GIL Cage CNP  Inpatient Treatment Team including Consults: Treatment Team: Attending Provider: aPscual Rey MD; Surgeon: Pascual Rey MD; Consulting Physician: Kemal Ramos MD; Utilization Reviewer: Ulysses Bird, RN; : Eunice Hill RN; Registered Nurse: Adam Oakley RN; Utilization Reviewer: Thom Mitchell RN; Utilization Reviewer: Eliane Brunner, RN    Reason for Hospitalization:   1. Status post total hip replacement, right    2. Acute postoperative pain      No chief complaint on file. Isolation:No active isolations    Hospital Course:  Admit Date: 2023  8:03 AM  Inpatient Rehab Referral Date: 23  Narrative of hospital course/history of present illness: Patient had Right total hip arthroplasty. 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 WBAT to operative extremity in hip abduction brace. Brace to be set to 0-5 degrees of abduction and 0-90 degrees of flexion. Mild post-op encephalopathy.       Medical & Surgical History/Current Comorbidities:  Past Medical History:   Diagnosis Date    BPH (benign prostatic hypertrophy)     CAD (coronary artery disease)     Cerebrovascular disease     Chronic otitis externa 2016    Chronic suppurative otitis media 2015 (07/18/23 2054)  Toilet Transfers  Toilet Transfer: Contact guard assistance (07/18/23 1111)  Toilet Transfers Comments: moderate cues for positioning, safety (07/18/23 1111)     Shower Transfers  Shower Transfers: Not tested (07/18/23 1111)      Speech Language Pathology             Diet/Swallow:                     Current Conditions Requiring Inpatient Rehabilitation  Bowel/Bladder Dysfunction: Yes  Intervention Required = Frequent toileting, Bowel program, and Briefs  Risk for Medical/Clinical Complications = moderate  Skin Healing/Breakdown Risk: Yes  Intervention Required = Side to side turns, Dressing changes, Surgical incision, and Monitor for S/S of infection  Risk for Medical/Clinical Complications = high  Nutrition/Hydration Deficiency: Yes  Intervention Required = Monitor I&Os and Check Labs  Risk for Medical/Clinical Complications = moderate  Medical Comorbidities: Yes  Intervention Required = DVT risk and CAD  Risk for Medical/Clinical Complications = moderate    Rehab/Skilled Needs:   3 hours of Intensive Acute Rehab therapy daily, 5 days/week for a total of 900 minutes  PT Treatment Time:  1.5 hrs/day  OT Treatment Time: 1.5 hrs/day  Rehabilitation Nursing   Case management/Social work  Oxygen/CPAP/BiPAP    Cultural needs:   Values / Beliefs  Do You Have Any Ethnic, Cultural, Sacramental, or Spiritual Yarsanism Needs You Would Like Us To Be Aware of While You Are in the Hospital : No    Funding needs:     None    Expected Level of Improvement with Rehab  Assist for ADL Curry  Assist for Transfers Curry  Assist for Gait Curry    Patient's willingness to participate: Yes  Patient's ability to tolerate proposed care: Yes  Patient/Family Goals of Rehab (in patient's/family's own words): Get stronger prior to returning home    Anticipated Discharge Plan:  Home with   Cole Aviles RN PT OT Aide to be determined       Barriers to Discharge:  Home entry accessibility  Equipment needs  Caregiver availability  Resource availability      Rehab evaluation plan: Recommend Acute Inpatient rehab  Rehabilitation Impairment Group Code: 8.51  Rehab Impairment Group: Orthopedic / Rheumatologic: Total Hip Replacement  Estimated Length of Stay (days): 12  Rehab Diagnosis: Impaired mobility and ADL's due to Right total hip arthroplasty. 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    Rehab Admitting Doctor: Dr. Mallika Newell DO

## 2023-07-18 NOTE — FLOWSHEET NOTE
Phone call mad to son Elvira Black. Updated on room number for rehab. This RN spoke with son and explained patients behavior today to see what patients base line is. Per son patient can be very angry and temperamental especially when being told what to do. He does not take direction well and can \"flip on the drop of a dime\" He has noticed some small confusion more recently when visiting father.

## 2023-07-18 NOTE — FLOWSHEET NOTE
Patient continuously attempting to get out of bed or chair and moving back and forth from bed to chair constantly. Patient is aggressive with poor safety awareness and appears confused. Upon entering room patient will state that he just left home, trying to find his white television remote and stating that people are working on his fridge and he needs to go check on them. When asking orientation questions patient answers them appropriately. Bed and chair alarms are activated and call light at patients bedside which he will not use. Ina monitor requested and spoke with  who spoke with son to come to bedside.

## 2023-07-18 NOTE — CARE COORDINATION
Inpatient Rehab referral received. Met with patient and explained [facility] Acute Inpatient Rehab program and requirements, including 3 hours of intense therapy daily, anticipated length of stay and goal of discharge to home. All questions answered and patient verbalized understanding. Freedom of choice provided and patient requests admit to [facility] if appropriate/approved by insurance. PM&R consult pending. The patient states he needs some rehab before returning home and if approved would like to have [address] [place] [address] rehab because he was living alone and independent before surgery. He says his son is local and helpful. Electronically signed by Ankita Gonzalez RN on 7/18/23 at 10:38 AM EDT     Called and spoke with the patient's son now that the patient has had some confusion and discussed acute rehab with him. He is coming in to see the patient and discuss the dc plan. I will follow up with the son and the patient this afternoon.   Electronically signed by Ankita Gonzalez RN on 7/18/23 at 12:37 PM EDT

## 2023-07-18 NOTE — FLOWSHEET NOTE
1742 Covid swab results in, negative. 1751 Attempted to call report to rehab. No answer. Will try again another time. 1824 Report called to rehab.

## 2023-07-18 NOTE — CARE COORDINATION
PAS reviewed by the PM&R physician and the patient has been accepted to [address] [facility] rehab and can admit to room 250 pending medical clearance and a negative Covid screening. Patricia DE LA CRUZ and 2W  aware.   Electronically signed by Endy Aden RN on 7/18/23 at [address] [facility] EDT

## 2023-07-18 NOTE — FLOWSHEET NOTE
Njasys alarming. Patient is attempting to get out of bed looking for a \"Black house phone\" States he just had it and threw it in the garbage. Patient angry and aggressive with staff trying to help and assist patient to a safe location either bed or chair. I personally went through the garbage and showed patient that there was no phone in the garbage. Spoke with lulus and they watched patient prior to him attempting to get out of bed and he did not have a phone in his hands prior to episode. Patient cell phone is at bedside. Assisted patient to chair and medicated for pain.

## 2023-07-19 PROBLEM — Z78.9 IMPAIRED MOBILITY AND ADLS: Status: RESOLVED | Noted: 2023-07-19 | Resolved: 2023-07-19

## 2023-07-19 PROBLEM — Z74.09 IMPAIRED MOBILITY AND ADLS: Status: ACTIVE | Noted: 2023-07-19

## 2023-07-19 PROBLEM — Z74.09 IMPAIRED MOBILITY AND ADLS: Status: RESOLVED | Noted: 2023-07-19 | Resolved: 2023-07-19

## 2023-07-19 PROBLEM — Z78.9 IMPAIRED MOBILITY AND ADLS: Status: ACTIVE | Noted: 2023-07-19

## 2023-07-19 PROBLEM — G89.18 POST-OP PAIN: Status: ACTIVE | Noted: 2023-07-19

## 2023-07-19 PROBLEM — I10 BENIGN ESSENTIAL HYPERTENSION: Status: RESOLVED | Noted: 2017-09-26 | Resolved: 2023-07-19

## 2023-07-19 PROBLEM — G93.40 ENCEPHALOPATHY ACUTE: Status: ACTIVE | Noted: 2023-07-19

## 2023-07-19 PROCEDURE — 1180000000 HC REHAB R&B

## 2023-07-19 PROCEDURE — 6360000002 HC RX W HCPCS: Performed by: PHYSICAL MEDICINE & REHABILITATION

## 2023-07-19 PROCEDURE — 99222 1ST HOSP IP/OBS MODERATE 55: CPT | Performed by: PHYSICAL MEDICINE & REHABILITATION

## 2023-07-19 PROCEDURE — 6370000000 HC RX 637 (ALT 250 FOR IP): Performed by: NURSE PRACTITIONER

## 2023-07-19 PROCEDURE — 97166 OT EVAL MOD COMPLEX 45 MIN: CPT

## 2023-07-19 PROCEDURE — 94150 VITAL CAPACITY TEST: CPT

## 2023-07-19 PROCEDURE — 92523 SPEECH SOUND LANG COMPREHEN: CPT

## 2023-07-19 PROCEDURE — 97110 THERAPEUTIC EXERCISES: CPT

## 2023-07-19 PROCEDURE — 92610 EVALUATE SWALLOWING FUNCTION: CPT

## 2023-07-19 PROCEDURE — 97162 PT EVAL MOD COMPLEX 30 MIN: CPT

## 2023-07-19 PROCEDURE — 97535 SELF CARE MNGMENT TRAINING: CPT

## 2023-07-19 PROCEDURE — 6370000000 HC RX 637 (ALT 250 FOR IP): Performed by: PHYSICAL MEDICINE & REHABILITATION

## 2023-07-19 PROCEDURE — 97116 GAIT TRAINING THERAPY: CPT

## 2023-07-19 RX ORDER — BISACODYL 10 MG
10 SUPPOSITORY, RECTAL RECTAL DAILY PRN
Status: DISCONTINUED | OUTPATIENT
Start: 2023-07-19 | End: 2023-07-26 | Stop reason: HOSPADM

## 2023-07-19 RX ORDER — UBIDECARENONE 100 MG
100 CAPSULE ORAL DAILY
Status: DISCONTINUED | OUTPATIENT
Start: 2023-07-19 | End: 2023-07-26 | Stop reason: HOSPADM

## 2023-07-19 RX ORDER — ENEMA 19; 7 G/133ML; G/133ML
1 ENEMA RECTAL DAILY PRN
Status: DISCONTINUED | OUTPATIENT
Start: 2023-07-19 | End: 2023-07-26 | Stop reason: HOSPADM

## 2023-07-19 RX ORDER — VITAMIN B COMPLEX
2000 TABLET ORAL
Status: DISCONTINUED | OUTPATIENT
Start: 2023-07-19 | End: 2023-07-26 | Stop reason: HOSPADM

## 2023-07-19 RX ORDER — TAMSULOSIN HYDROCHLORIDE 0.4 MG/1
0.4 CAPSULE ORAL EVERY EVENING
Status: DISCONTINUED | OUTPATIENT
Start: 2023-07-19 | End: 2023-07-26 | Stop reason: HOSPADM

## 2023-07-19 RX ORDER — CYANOCOBALAMIN 1000 UG/ML
1000 INJECTION, SOLUTION INTRAMUSCULAR; SUBCUTANEOUS WEEKLY
Status: DISCONTINUED | OUTPATIENT
Start: 2023-07-19 | End: 2023-07-26 | Stop reason: HOSPADM

## 2023-07-19 RX ORDER — ACETAMINOPHEN 325 MG/1
650 TABLET ORAL EVERY 4 HOURS PRN
Status: DISCONTINUED | OUTPATIENT
Start: 2023-07-19 | End: 2023-07-26 | Stop reason: HOSPADM

## 2023-07-19 RX ORDER — ACETAMINOPHEN 325 MG/1
650 TABLET ORAL EVERY 4 HOURS PRN
Status: DISCONTINUED | OUTPATIENT
Start: 2023-07-19 | End: 2023-07-19

## 2023-07-19 RX ORDER — LIDOCAINE 4 G/G
3 PATCH TOPICAL DAILY
Status: DISCONTINUED | OUTPATIENT
Start: 2023-07-19 | End: 2023-07-21

## 2023-07-19 RX ADMIN — CYANOCOBALAMIN 1000 MCG: 1000 INJECTION, SOLUTION INTRAMUSCULAR; SUBCUTANEOUS at 09:15

## 2023-07-19 RX ADMIN — TAMSULOSIN HYDROCHLORIDE 0.4 MG: 0.4 CAPSULE ORAL at 17:44

## 2023-07-19 RX ADMIN — ATORVASTATIN CALCIUM 20 MG: 20 TABLET, FILM COATED ORAL at 22:19

## 2023-07-19 RX ADMIN — ASPIRIN 81 MG: 81 TABLET, COATED ORAL at 22:19

## 2023-07-19 RX ADMIN — METOPROLOL SUCCINATE 25 MG: 25 TABLET, EXTENDED RELEASE ORAL at 22:20

## 2023-07-19 RX ADMIN — Medication 2000 UNITS: at 15:42

## 2023-07-19 RX ADMIN — ASPIRIN 81 MG: 81 TABLET, COATED ORAL at 09:14

## 2023-07-19 RX ADMIN — SENNOSIDES AND DOCUSATE SODIUM 1 TABLET: 50; 8.6 TABLET ORAL at 09:15

## 2023-07-19 RX ADMIN — ACETAMINOPHEN 650 MG: 325 TABLET ORAL at 05:46

## 2023-07-19 RX ADMIN — LOSARTAN POTASSIUM 25 MG: 25 TABLET, FILM COATED ORAL at 09:15

## 2023-07-19 RX ADMIN — FINASTERIDE 5 MG: 5 TABLET, FILM COATED ORAL at 09:15

## 2023-07-19 RX ADMIN — Medication 100 MG: at 09:21

## 2023-07-19 RX ADMIN — OXYCODONE HYDROCHLORIDE 5 MG: 5 CAPSULE ORAL at 09:15

## 2023-07-19 RX ADMIN — OXYCODONE HYDROCHLORIDE 5 MG: 5 CAPSULE ORAL at 15:42

## 2023-07-19 RX ADMIN — SENNOSIDES AND DOCUSATE SODIUM 1 TABLET: 50; 8.6 TABLET ORAL at 22:19

## 2023-07-19 RX ADMIN — ACETAMINOPHEN 650 MG: 325 TABLET ORAL at 23:45

## 2023-07-19 RX ADMIN — ACETAMINOPHEN 650 MG: 325 TABLET ORAL at 11:59

## 2023-07-19 RX ADMIN — ACETAMINOPHEN 650 MG: 325 TABLET ORAL at 17:44

## 2023-07-19 ASSESSMENT — PAIN DESCRIPTION - LOCATION
LOCATION: HIP

## 2023-07-19 ASSESSMENT — ENCOUNTER SYMPTOMS
PHOTOPHOBIA: 0
NAUSEA: 0
BACK PAIN: 1
DIARRHEA: 0
VOMITING: 0
WHEEZING: 0
BLOOD IN STOOL: 0
SHORTNESS OF BREATH: 1
STRIDOR: 0
CONSTIPATION: 1
ABDOMINAL PAIN: 0
COUGH: 0
EYE REDNESS: 0
EYE PAIN: 0
SORE THROAT: 0

## 2023-07-19 ASSESSMENT — PAIN DESCRIPTION - FREQUENCY: FREQUENCY: INTERMITTENT

## 2023-07-19 ASSESSMENT — PAIN DESCRIPTION - DESCRIPTORS
DESCRIPTORS: ACHING;DISCOMFORT
DESCRIPTORS: ACHING
DESCRIPTORS: ACHING;DISCOMFORT

## 2023-07-19 ASSESSMENT — PAIN DESCRIPTION - ORIENTATION
ORIENTATION: RIGHT

## 2023-07-19 ASSESSMENT — PAIN DESCRIPTION - PAIN TYPE: TYPE: SURGICAL PAIN

## 2023-07-19 ASSESSMENT — PAIN SCALES - GENERAL
PAINLEVEL_OUTOF10: 1
PAINLEVEL_OUTOF10: 1
PAINLEVEL_OUTOF10: 3
PAINLEVEL_OUTOF10: 4

## 2023-07-19 ASSESSMENT — PAIN - FUNCTIONAL ASSESSMENT: PAIN_FUNCTIONAL_ASSESSMENT: ACTIVITIES ARE NOT PREVENTED

## 2023-07-19 NOTE — PROGRESS NOTES
Facility/Department: [facility]  Rehabilitation Initial Assessment: Occupational Therapy  Room: R250/R250-01    NAME: Jakub Montesinos  : 1945  MRN: [id number]    Date of Service: 2023    Rehab Diagnosis(es): Imp ADLs d/t R total hip arthroplasty. Also a repair of the chronic tear of the gluteus minimus and medius  Patient Active Problem List    Diagnosis Date Noted    Post-op pain 2023    Encephalopathy acute 2023    Impaired mobility and activities of daily living dt  Right total hip arthroplasty.  (Right: Hip) 2023    Gluteal tendinitis of right buttock 2023    Status post total hip replacement, right 2023    Morbidly obese ([address]) 2020    Perforation of right tympanic membrane     Trigger thumb, left thumb     Hypogonadism in male     Chronic otitis externa 2016    CAD (coronary artery disease)     Seborrheic keratoses     Sensorineural hearing loss, bilateral     Chronic suppurative otitis media 2015    ED (erectile dysfunction) 2014    Hyperlipidemia 10/10/2013    Hypertension     Benign prostatic hyperplasia with incomplete bladder emptying     Cerebrovascular disease        Past Medical History:   Diagnosis Date    BPH (benign prostatic hypertrophy)     CAD (coronary artery disease)     Cerebrovascular disease     Chronic otitis externa 2016    Chronic suppurative otitis media 2015    Ear injury 2006    [address]    ED (erectile dysfunction) 2014    Hearing loss     History of nephrolithiasis     History of pneumonia 2004    History of prediabetes     Hyperlipidemia 10/10/2013    Hypertension     Hypogonadism in male     Mass of chest wall, left     Optic neuropathy, ischemic     Osteoarthritis of both hips     Paresthesias     Perforation of right tympanic membrane     chronic; Dr. Galina Dawkins PSA (prostate specific antigen) - 1.12    Seborrheic keratosis 10/10/2013    Sensorineural hearing loss, bilateral Stable angina (HCC)     Trigger thumb, left thumb      Past Surgical History:   Procedure Laterality Date    CARPAL TUNNEL RELEASE Right 05/30/2017    ESME PEARL MD    CARPAL TUNNEL RELEASE Left 06/2017    CCRAVEN PEARL MD    COLONOSCOPY  6/2015    Dr. Cammy Rowland      lower eyelids    [address] RESECTION  3/22/16    DR. BARRAGAN    OTHER SURGICAL HISTORY Left 1/2015    hydrocelectomy    TOTAL HIP ARTHROPLASTY Right 7/17/2023    Right total hip arthroplasty. performed by Gio Perez MD at [address] [place] [address]     Restrictions:  Restrictions/Precautions  Restrictions/Precautions: Fall Risk;Weight Bearing  Lower Extremity Weight Bearing Restrictions  Right Lower Extremity Weight Bearing: Weight Bearing As Tolerated (in ABD brace)  Position Activity Restriction  Other position/activity restrictions: Posterior lateral hip precautions- avoid figure 4    Subjective:  General  Chart Reviewed: Yes  Family / Caregiver Present: No  Referring Practitioner: Dr. Devere Schaumann  Diagnosis: Imp ADLs d/t R total hip arthroplasty. Also a repair of the chronic tear of the gluteus minimus and medius    Patient's date of birth confirmed: Yes     Pain at start of treatment: Yes: 2/10    Pain at end of treatment: Yes: 2/10    Location: R hip, L shoulder   Pain description: ache  Nursing notified: Declined    Social Functional:  Social/Functional History  Lives With: Alone  Type of Home: House  Home Layout: One level  Home Access: Stairs to enter with rails  Bathroom Shower/Tub: Tub/Shower unit; Walk-in shower (uses walk in shower)  Bathroom Toilet: Standard  Bathroom Equipment: Grab bars in shower; Toilet raiser  Home Equipment: Arlyne Croak, rolling;Cane  Has the patient had two or more falls in the past year or any fall with injury in the past year?: No  Receives Help From: Family (son lives a couple miles away)  ADL Assistance: Felix

## 2023-07-19 NOTE — PROGRESS NOTES
Physical Therapy Rehab Treatment Note  Facility/Department: [facility]  [facility]       NAME: Lena Benton  : 1945 (89 y.o.)  MRN: [id number]  CODE STATUS: Full Code    Date of Service: 2023     Restrictions:  Restrictions/Precautions: Fall Risk, Weight Bearing  Lower Extremity Weight Bearing Restrictions  Right Lower Extremity Weight Bearing: Weight Bearing As Tolerated  Position Activity Restriction  Other position/activity restrictions: R hip abd brace. Avoid figure 4 R hip and active hip abd ROM exercises. SUBJECTIVE:      Pain  Pain: 1/10 R hip    OBJECTIVE:         Bed mobility  Supine to Sit: Minimal assistance  Sit to Supine: Stand by assistance  Bed Mobility Comments: Increased difficulty with sup to sit from R side. Transfers  Sit to Stand: Stand by assistance;Contact guard assistance  Stand to Sit: Stand by assistance;Contact guard assistance  Comment: VCs and demo for sequencing. VCs to advance R LE prior to sitting to decrease pain. Ambulation  Surface: Level tile  Device: Rolling Walker  Assistance: Stand by assistance;Contact guard assistance  Quality of Gait: Pt ambulated posterior to [employer] with fwd flexed posture and heavy bracing through UEs. VCs for posture and approximation to [employer]. Decreased WBing on R LE. Gait Deviations: Slow Katharina;Decreased step length  Distance: 100ft  Comments: VCs for posture and to look ahead. Stairs/Curb  Stairs?: Yes  Stairs  # Steps : 4  Stairs Height: 6\"  Rails: Bilateral  Assistance: Contact guard assistance  Comment: Initiated stair training. Instructed pt on sequencing. PT Exercises  A/AROM Exercises: R AP, QS, heel slides, and SAQ x10 ea  Functional Mobility Circuit Training: Fwd/retro gait in //bars. VCs to look ahead.   Standing Open/Closed Kinetic Chain Exercises: sink ex x10 ea (except hip abd held)            ASSESSMENT/PROGRESS TOWARDS GOALS:   Body Structures, Functions, Activity Limitations Requiring Skilled

## 2023-07-19 NOTE — CONSULTS
Consult Note            Date:7/19/2023        Patient Lobito Winter     YOB: 1945     Age:78 y.o. Reason for Consult: Medication management for COPD, HTN    Chief Complaint   Right hip arthroplasty    History Obtained From   patient, electronic medical record    History of Present Illness   Patient is a 79-year-old male with a past medical history of CAD, cerebrovascular disease, BPH, COPD, HTN, hyperlipidemia, and osteoarthritis of both hips currently admitted to acute rehab services for impaired mobility related to right total hip arthroplasty. Patient denies chest pain, palpitations, headache, dizziness, shortness of breath, cough, fever, chills, N/V/D, and changes in appetite. Past Medical History     Past Medical History:   Diagnosis Date    BPH (benign prostatic hypertrophy)     CAD (coronary artery disease)     Cerebrovascular disease     Chronic otitis externa 2/11/2016    Chronic suppurative otitis media 11/11/2015    Ear injury 2006    [address] [place] [address]    ED (erectile dysfunction) 11/28/2014    Hearing loss     History of nephrolithiasis     History of pneumonia 2004    History of prediabetes     Hyperlipidemia 10/10/2013    Hypertension     Hypogonadism in male     Mass of chest wall, left     Optic neuropathy, ischemic     Osteoarthritis of both hips     Paresthesias     Perforation of right tympanic membrane     chronic; Dr. Israel Collins PSA (prostate specific antigen) 2007- 1.12    Seborrheic keratosis 10/10/2013    Sensorineural hearing loss, bilateral     Stable angina (HCC)     Trigger thumb, left thumb         Past Surgical History     Past Surgical History:   Procedure Laterality Date    CARPAL TUNNEL RELEASE Right 05/30/2017    CCRAVEN PEARL MD    CARPAL TUNNEL RELEASE Left 06/2017    CCRAVEN PEARL MD    COLONOSCOPY  6/2015    Dr. Catie Quinonez      lower eyelids    [address] RESECTION  3/22/16     7/19/2023 Yes    Hypertension 7/19/2023 Yes    Benign prostatic hyperplasia with incomplete bladder emptying 7/19/2023 Yes    Overview Signed 10/14/2017  7:25 PM by [facility]     Updating Deprecated Diagnoses           Cerebrovascular disease 7/19/2023 Yes    Sensorineural hearing loss, bilateral 7/19/2023 Yes    Hyperlipidemia 7/19/2023 Yes    CAD (coronary artery disease) 7/19/2023 Yes    Status post total hip replacement, right 7/19/2023 Yes    Post-op pain 7/19/2023 Yes    Encephalopathy acute 7/19/2023 Yes       Plan   *Impaired mobility and activities of daily living due to right hip arthroplasty   -Dr. Iban Roque managing    *Hypertension  -On losartan, and metoprolol    *Cerebrovascular disease, CAD  -On aspirin and statin    *Osteoarthritis  -On oxycodone and Tylenol as needed    *BPH  -PSA on 12/13/2023 1.33  -Denies urinary symptoms  -On Proscar and Flomax    Thank you for consult. Hospital medicine managing acute needs. Patient will need to follow-up with PCP for chronic disease management. Time spent with patient 35 minutes. Greater than 70% of time  spent focused exclusively on this patient ,reviewing  chart,  reconciling medications, &  answering questions with patient and discussing plan.     Electronically signed by GIL Zapata CNP on 7/19/23 at 9:58 AM EDT

## 2023-07-19 NOTE — PROGRESS NOTES
[facility]   Facility/Department: [facility]  Speech Language Pathology  Initial Speech/Language/Cognitive Assessment    NAME:Flex Grimm  : 1945 (66 y.o.)   [x]   confirmed    MRN: [id number]  ROOM: [place] [address]  ADMISSION DATE: 2023  PATIENT DIAGNOSIS(ES): Impaired mobility and ADLs [Z74.09, Z78.9]  No chief complaint on file. Patient Active Problem List    Diagnosis Date Noted    Post-op pain 2023    Encephalopathy acute 2023    Impaired mobility and activities of daily living dt  Right total hip arthroplasty.  (Right: Hip) 2023    Gluteal tendinitis of right buttock 2023    Status post total hip replacement, right 2023    Morbidly obese ([address]) 2020    Perforation of right tympanic membrane     Trigger thumb, left thumb     Hypogonadism in male     Chronic otitis externa 2016    CAD (coronary artery disease)     Seborrheic keratoses     Sensorineural hearing loss, bilateral     Chronic suppurative otitis media 2015    ED (erectile dysfunction) 2014    Hyperlipidemia 10/10/2013    Hypertension     Benign prostatic hyperplasia with incomplete bladder emptying     Cerebrovascular disease      Past Medical History:   Diagnosis Date    BPH (benign prostatic hypertrophy)     CAD (coronary artery disease)     Cerebrovascular disease     Chronic otitis externa 2016    Chronic suppurative otitis media 2015    Ear injury 2006    [address] [place] [address]    ED (erectile dysfunction) 2014    Hearing loss     History of nephrolithiasis     History of pneumonia 2004    History of prediabetes     Hyperlipidemia 10/10/2013    Hypertension     Hypogonadism in male     Mass of chest wall, left     Optic neuropathy, ischemic     Osteoarthritis of both hips     Paresthesias     Perforation of right tympanic membrane     chronic; Dr. Bucio Ing PSA (prostate specific antigen) - 1.12    Seborrheic keratosis 10/10/2013    Sensorineural

## 2023-07-19 NOTE — PROGRESS NOTES
Pt to  @ 1900. Pt assessment was completed-on arrival pt agitated, and impulsive- son here and stated pt is loud but not \"confused\". Son left rather quickly after pt got up by self for second time-this RN assured son pt would be fine- reordered AVASYS for pt safety. Once pt situated calmer and more pleasant and talkative. Pt with some STM deficit noted, impulsive-and can get agitated when wants to do what he wants to do. Pt total OOB attempt x3. Pt had R THR on 7/17 by Dr. Lan Graham ABD brace to R hip intact, thigh high teds removed for bed. Pt with difficulty sleeping so far- easily awake. Pt was on Trazadone and zanaflex @ home. Pt has voided x4 already this shift. Apparently likes to stand  to urinate. Last BM 7/16. Consents need signed by son Mary Lao (son). Pt is extremely [place] , H/O ruptured typanic membrane L- pt has hearing aides. Call light with in reach bed alarm activated, aVASYS for pt safety.

## 2023-07-19 NOTE — H&P
HISTORY & PHYSICAL       DATE OF ADMISSION:  7/18/2023    DATE OF SERVICE:  7/19/23    Subjective:    Eusebio Womack, 66 y.o. male presents today with:     CHIEF COMPLAINT:  He is recovering from:          Procedure Summary      Date: 07/17/23 Room / Location: [facility] OR [address] [place] [address]     Anesthesia Start: 1037 Anesthesia Stop:      Procedure: Right total hip arthroplasty. (Right: Hip)-Right total hip arthroplasty. 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 Postop he is suffering from encephalopathy and severe postop pain. 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 He is to have an abduction brace in place that at 0 to 5 degrees of abduction is 0 to 90 degrees of flexion. Postop he has had confusion and agitation likely secondary to a change in his usual routine with baseline cognitive deficits. We are limiting sedating medications monitor for nocturnal hypoxemia as well as promote hydration and monitor for UTIs given his history of prostatic hypertrophy. Hip Pain   The incident occurred more than 1 week ago. The pain is present in the right hip, right thigh and right knee. The quality of the pain is described as aching and cramping. The pain is at a severity of 10/10. The pain is severe. The pain has been Fluctuating since onset. Associated symptoms include an inability to bear weight and a loss of motion. Possible foreign bodies include metal. The symptoms are aggravated by movement, palpation and weight bearing. He has tried elevation, ice and acetaminophen for the symptoms.  The treatment provided mild pressures. They will be reassessed daily regarding their ability to participate in an acute level rehabilitation program.  Recreational Therapy will be consulted for community re-entry and adjustment to disability. Communication, cognitive and emotional issues will also be addressed during this rehabilitation stay by rehabilitation psychologist or speech therapist as appropriate. I reviewed the patient's old and current charts and discussed other rehabilitation options with the rehabilitation team including the rehab RN and the admission team as well as the patient. I feel that the patient's functional recovery would be best served at an acute inpatient rehabilitation program because the patient needs intense therapy three hours per day, direct RN supervision and daily monitoring by a physician for medical status. This cannot be sufficiently provided by home health care, a skilled nursing facility or in an outpatient setting. I further feel that the patient has the potential to improve functional abilities in an acute intensive rehabilitation program.    Old records were reviewed and summarized. 2.  Other diagnoses which complicate rehabilitation stay include:     Principal Problem:    Impaired mobility and activities of daily living dt  Right total hip arthroplasty. (Right: Hip)  Active Problems:    Hypertension,  CAD (coronary artery disease), Hyperlipidemia,  Cerebrovascular disease-Acute rehab to monitor heart rate and rhythm with the option of telemetry and the effects of chronotropic medication with respect to increasing physical activity and exercise in PT, OT, ADLs with medication titration to lowest effective dosing. Continue blood signs every shift focusing on heart rate, rhythm and blood pressure checks with orthostatic checks-monitoring the effect of exercise, therapy and posture.   Consult hospitalist for backup medical and adjust/add medications (aspirin, Lipitor, Cozaar, Toprol, monitor

## 2023-07-19 NOTE — PROGRESS NOTES
Physical Therapy Rehab Treatment Note  Facility/Department: [facility]  [facility]       NAME: Savanna Heard  : 1945 (50 y.o.)  MRN: [id number]  CODE STATUS: Full Code    Date of Service: 2023     Restrictions:  Restrictions/Precautions: Fall Risk, Weight Bearing  Lower Extremity Weight Bearing Restrictions  Right Lower Extremity Weight Bearing: Weight Bearing As Tolerated  Position Activity Restriction  Other position/activity restrictions: avoid figure 4 R hip     SUBJECTIVE:      Pain  Pain: Pt denies pain    OBJECTIVE:         Bed mobility  Sit to Supine: Contact guard assistance  Bed Mobility Comments: HOB flat with no rails;  VCs for positioning in bed. Transfers  Sit to Stand: Stand by assistance;Contact guard assistance  Stand to Sit: Stand by assistance;Contact guard assistance  Comment: VCs and demo for sequencing. VCs to advance R LE prior to sitting to decrease pain. Ambulation  Surface: Level tile  Device: Rolling Walker  Assistance: Contact guard assistance  Quality of Gait: Pt ambulated posterior to [employer] with fwd flexed posture and heavy bracing through UEs. VCs for posture and approximation to [employer]. Decreased WBing on R LE. Gait Deviations: Slow Katharina;Decreased step length  Distance: 50ftx2           PT Exercises  Functional Mobility Circuit Training: STS x3  Standing Open/Closed Kinetic Chain Exercises: heel raises and march x10 ea at [employer]            ASSESSMENT/PROGRESS TOWARDS GOALS:   Body Structures, Functions, Activity Limitations Requiring Skilled Therapeutic Intervention: Decreased functional mobility ; Decreased ADL status; Decreased strength;Decreased safe awareness;Decreased endurance;Decreased ROM; Decreased balance;Decreased coordination; Increased pain;Decreased vision/visual deficit  Assessment: Cues required to improve transfers and gait with fair follow through.   Goals:  Long Term Goals  Long Term Goal 1: Pt to complete bed mobility indep and efficiently  Long Term Goal 2: Pt to complete bed, sit to stand and car transfers  indep  Long Term Goal 3: Pt to ambulate 50-150ft with appropriate device and indep  Long Term Goal 4: Pt to complete 3 steps with HR and SBA  Long Term Goal 5: Pt to complete HEP with indep    PLAN OF CARE/Safety: Cont per POC     Therapy Time:   Individual   Time In 1100   Time Out 1870   Minutes 37      Minutes:  Transfer/Bed mobility training:10  Gait trainin  Neuro re education:0  Therapeutic ex:10    Hiram Vega PTA, 23 at 12:13 PM

## 2023-07-19 NOTE — CARE COORDINATION
Social/Functional Status:  Social/Functional History  Lives With: Alone  Type of Home: House  Home Layout: One level  Home Access: Stairs to enter with rails  Entrance Stairs - Number of Steps: 3  Entrance Stairs - Rails: Right  Bathroom Shower/Tub: Tub/Shower unit, Walk-in shower (tub/shower unit--curtain and is the one he typically uses)  Bathroom Toilet: Standard  Bathroom Equipment: Grab bars in shower, Toilet raiser  Bathroom Accessibility: Walker accessible  Home Equipment: Arlyne Croak, rolling, Cane  Has the patient had two or more falls in the past year or any fall with injury in the past year?: No  Receives Help From: Family  ADL Assistance: [address] Independent  Homemaking Responsibilities: Yes  Meal Prep Responsibility: Primary  Laundry Responsibility: Primary (first floor)  Cleaning Responsibility: Primary  Bill Paying/Finance Responsibility: Primary (some auto pay, some checks)  Shopping Responsibility: Primary  Health Care Management: Primary (has pill bottles lined up on a tray and takes it that way)  Ambulation Assistance: Independent (no AD)  Transfer Assistance: Independent  Active : Yes  Mode of Transportation: SUV (Escape)  Education: Highschool  Occupation: Retired  Type of Occupation: [employer]  IADL Comments: Pt reports the only thinkg family does for him is make important phone calls at times d.t hearing difficulties    Spoke with patient and explained role in the team. Patient questions answered appropriately. Explained discharge process. Patient stated understanding. Pt graduated high school and retired from [employer]. Pt also served in [employer] and has benefits through the [place], pt noted that is who supplied his hearing aids. Pt's support system consists of his two sons, one lives a few miles away and the other lives 79 miles away per pt. Pt plans to return to his one level home, there are 3 CAROLE w/ R HR ascending.  Pt's bathroom is walker accessible and has a

## 2023-07-19 NOTE — PROGRESS NOTES
Nutrition Assessment     Type and Reason for Visit: Initial, Consult    Nutrition Recommendations/Plan:   Suggest Cardiac dietary guidelines (Low Fat/Chol, RICK, High Fiber)     Malnutrition Assessment:  Malnutrition Status:  No Malnutrition    Nutrition Assessment:  Pt is stable from a nutritional standpoint, with verbalized good appetite/intake, currently and pta, with no nutritional complaints. To continue to follow. Nutrition Related Findings:   PMH-htn, CAD, CVA, COPD; admitted s/p Right total hip arthroplasty on 7/17. Labs/meds reviewed. +1 Gen edema noted. Wound Type: Surgical Incision    Current Nutrition Therapies:    ADULT DIET;  Regular    Anthropometric Measures:  Height: 5' 8.5\" (174 cm)  Current Body Wt:  237lb (adm, bedscale)  BMI:  35.6    Nutrition Diagnosis:   No nutrition diagnosis at this time     Nutrition Interventions:   Food and/or Nutrient Delivery: Modify Current Diet (Suggest Cardiac dietary guidelines (Low Fat/Chol, RICK, High Fiber))  Nutrition Education/Counseling: No recommendation at this time  Coordination of Nutrition Care: Continue to monitor while inpatient       Goals:     Goals: PO intake 75% or greater       Nutrition Monitoring and Evaluation:      Food/Nutrient Intake Outcomes: Food and Nutrient Intake  Physical Signs/Symptoms Outcomes: Weight, Biochemical Data, Fluid Status or Edema    Discharge Planning:    No discharge needs at this time     Caryle Courser, RD, LD

## 2023-07-19 NOTE — PLAN OF CARE
Problem: Discharge Planning  Goal: Discharge to home or other facility with appropriate resources  Outcome: Progressing  Flowsheets (Taken 7/18/2023 1945)  Discharge to home or other facility with appropriate resources: Identify barriers to discharge with patient and caregiver

## 2023-07-19 NOTE — PROGRESS NOTES
Facility/Department: [facility] Initial Assessment: Physical Therapy  Room: R250/R250-01    NAME: Lena Benton  : 1945  MRN: [id number]    Date of Service: 2023    Rehab Diagnosis(es): Impaired mobility and ADL's due to Right total hip arthroplasty. 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  (Right: Hip) 2023    Gluteal tendinitis of right buttock 2023    Status post total hip replacement, right 2023    Morbidly obese ([address]) 2020    Perforation of right tympanic membrane     Trigger thumb, left thumb     Hypogonadism in male     Chronic otitis externa 2016    CAD (coronary artery disease)     Seborrheic keratoses     Sensorineural hearing loss, bilateral     Chronic suppurative otitis media 2015    ED (erectile dysfunction) 2014    Hyperlipidemia 10/10/2013    Hypertension     Benign prostatic hyperplasia with incomplete bladder emptying     Cerebrovascular disease        Past Medical History:   Diagnosis Date    BPH (benign prostatic hypertrophy)     CAD (coronary artery disease)     Cerebrovascular disease     Chronic otitis externa 2016    Chronic suppurative otitis media 2015    Ear injury 2006    [address]    ED (erectile dysfunction) 2014    Hearing loss     History of nephrolithiasis     History of pneumonia 2004    History of prediabetes     Hyperlipidemia 10/10/2013    Hypertension     Hypogonadism in male     Mass of chest wall, left     Optic neuropathy, ischemic     Osteoarthritis of both hips     Paresthesias     Perforation of right tympanic membrane     chronic; Dr. Carolyn Saab    Screening PSA (prostate specific antigen) - 1.12    Seborrheic keratosis 10/10/2013

## 2023-07-19 NOTE — CARE COORDINATION
[facility] NOTE  Room: R250/R250-01  Admit Date: 2023       Date: 2023  Patient Name: Owen Grayson        MRN: [id number]    : 1945  (74 y.o.)  Gender: male        REHAB DIAGNOSIS:   Diagnosis: Impaired mobility and ADL's due to Right total hip arthroplasty. Also a repair of the chronic tear of gluteus minimus and gluteus medius    CO MORBIDITIES:      Past Medical History:   Diagnosis Date    BPH (benign prostatic hypertrophy)     CAD (coronary artery disease)     Cerebrovascular disease     Chronic otitis externa 2016    Chronic suppurative otitis media 2015    Ear injury 2006    [address]    ED (erectile dysfunction) 2014    Hearing loss     History of nephrolithiasis     History of pneumonia     History of prediabetes     Hyperlipidemia 10/10/2013    Hypertension     Hypogonadism in male     Mass of chest wall, left     Optic neuropathy, ischemic     Osteoarthritis of both hips     Paresthesias     Perforation of right tympanic membrane     chronic; Dr. David Marsh PSA (prostate specific antigen) - .12    Seborrheic keratosis 10/10/2013    Sensorineural hearing loss, bilateral     Stable angina (HCC)     Trigger thumb, left thumb      Past Surgical History:   Procedure Laterality Date    CARPAL TUNNEL RELEASE Right 2017    CCRAVEN PEARL MD    CARPAL TUNNEL RELEASE Left 2017    CCRAVEN PEARL MD    COLONOSCOPY  2015    Dr. Nandini Mi      lower eyelids    [address] RESECTION  3/22/16    DR. BARRAGAN    OTHER SURGICAL HISTORY Left 2015    hydrocelectomy    TOTAL HIP ARTHROPLASTY Right 2023    Right total hip arthroplasty.  performed by Camilo Carey MD at [address] [place] [address]      At the [facility]        Restrictions  Restrictions/Precautions: Fall Risk, Weight Bearing  Lower Extremity Weight Bearing

## 2023-07-19 NOTE — PROGRESS NOTES
Physical Therapy  Facility/Department: [facility] MED SURG R551/X923-75  Physical Therapy Discharge      NAME: Rodriguez Sanchez    : 1945 (62 y.o.)  MRN: [id number]    Account: [de-identified]  Gender: male      Patient has been discharged from acute care hospital. DC patient from current PT program.      Electronically signed by [place] PT on 23 at 4:31 PM EDT

## 2023-07-19 NOTE — DISCHARGE SUMMARY
Discharge summary  This patient Owen Grayson was admitted to the hospital on 7/17/2023  after undergoing Procedure(s) (LRB):  Right total hip arthroplasty. (Right) without complications that morning. During the postoperative period,while in hospital, patient was medically managed by the hospitalist. Please see medial notes and H&P for patients additional diagnoses. Ortho agrees with all medical diagnoses and treatments while patient in hospital.  No significant or unexpected findings or abnormal ortho imaging were noted during the hospital stay    Hospital course      Patient tolerated surgical procedure well and there was no complications. Patient progressed adequately through their recovery during hospital stay including PT and rehabilitation. Patient was then D/C on 7/18/2023 to Rehab  in stable condition. Patient was instructed on the use of pain medications, the signs and symptoms of infection, and was given our number to call should they have any questions or concerns following discharge. Patient may WBAT with posterolateral hip precautions in hip abduction brace to operative extremity with use of walker for assistance with ambulation   Aquacel dressing to be removed pod7 and incision left open to air  ASA 81 mg BID for DVT prophylaxis started on 7/17/23 and to be taken for 30 days  Follow up with surgeon in 2 weeks    Radiology images   HISTORY:  ORDERING SYSTEM PROVIDED HISTORY: post-op  TECHNOLOGIST PROVIDED HISTORY:  Reason for exam:->post-op  What reading provider will be dictating this exam?->CRC     FINDINGS:  Anatomically aligned recent right CARLITOS in the frontal projection. No abnormal  bone or soft tissue findings. IMPRESSION:  Recent right CARLITOS with no unexpected findings.               Specimen Collected: 07/17/23 13:32 EDT Last Resulted: 07/17/23 13:33 EDT                   Secondary diagnoses:   Acute postoperative pain: stable

## 2023-07-19 NOTE — PROGRESS NOTES
[facility] [place]   Facility/Department: [facility]  Speech Language Pathology  Clinical Bedside Swallow Evaluation    NAME:Flex Herrmann  : 1945 (66 y.o.)   [x]   confirmed    MRN: [id number]  ROOM: [place] [address]  ADMISSION DATE: 2023  PATIENT DIAGNOSIS(ES): Impaired mobility and ADLs [Z74.09, Z78.9]  No chief complaint on file. Patient Active Problem List    Diagnosis Date Noted    Post-op pain 2023    Encephalopathy acute 2023    Impaired mobility and activities of daily living dt  Right total hip arthroplasty.  (Right: Hip) 2023    Gluteal tendinitis of right buttock 2023    Status post total hip replacement, right 2023    Morbidly obese ([address]) 2020    Perforation of right tympanic membrane     Trigger thumb, left thumb     Hypogonadism in male     Chronic otitis externa 2016    CAD (coronary artery disease)     Seborrheic keratoses     Sensorineural hearing loss, bilateral     Chronic suppurative otitis media 2015    ED (erectile dysfunction) 2014    Hyperlipidemia 10/10/2013    Hypertension     Benign prostatic hyperplasia with incomplete bladder emptying     Cerebrovascular disease      Past Medical History:   Diagnosis Date    BPH (benign prostatic hypertrophy)     CAD (coronary artery disease)     Cerebrovascular disease     Chronic otitis externa 2016    Chronic suppurative otitis media 2015    Ear injury 2006    [address] [place] [address]    ED (erectile dysfunction) 2014    Hearing loss     History of nephrolithiasis     History of pneumonia 2004    History of prediabetes     Hyperlipidemia 10/10/2013    Hypertension     Hypogonadism in male     Mass of chest wall, left     Optic neuropathy, ischemic     Osteoarthritis of both hips     Paresthesias     Perforation of right tympanic membrane     chronic; Dr. Preston Cano    Screening PSA (prostate specific antigen) - 1.12    Seborrheic keratosis 10/10/2013    Sensorineural hearing

## 2023-07-20 PROBLEM — G47.30 SLEEP APNEA: Status: ACTIVE | Noted: 2023-07-20

## 2023-07-20 PROBLEM — G47.34 NOCTURNAL HYPOXEMIA: Status: ACTIVE | Noted: 2023-07-20

## 2023-07-20 PROCEDURE — 97130 THER IVNTJ EA ADDL 15 MIN: CPT

## 2023-07-20 PROCEDURE — 99233 SBSQ HOSP IP/OBS HIGH 50: CPT | Performed by: PHYSICAL MEDICINE & REHABILITATION

## 2023-07-20 PROCEDURE — 97110 THERAPEUTIC EXERCISES: CPT

## 2023-07-20 PROCEDURE — 99222 1ST HOSP IP/OBS MODERATE 55: CPT | Performed by: INTERNAL MEDICINE

## 2023-07-20 PROCEDURE — 6370000000 HC RX 637 (ALT 250 FOR IP): Performed by: PHYSICAL MEDICINE & REHABILITATION

## 2023-07-20 PROCEDURE — 97129 THER IVNTJ 1ST 15 MIN: CPT

## 2023-07-20 PROCEDURE — 97535 SELF CARE MNGMENT TRAINING: CPT

## 2023-07-20 PROCEDURE — 94762 N-INVAS EAR/PLS OXIMTRY CONT: CPT

## 2023-07-20 PROCEDURE — 6370000000 HC RX 637 (ALT 250 FOR IP): Performed by: NURSE PRACTITIONER

## 2023-07-20 PROCEDURE — 1180000000 HC REHAB R&B

## 2023-07-20 PROCEDURE — 97116 GAIT TRAINING THERAPY: CPT

## 2023-07-20 RX ORDER — OXYMETAZOLINE HYDROCHLORIDE 0.05 G/100ML
2 SPRAY NASAL 2 TIMES DAILY
Status: DISPENSED | OUTPATIENT
Start: 2023-07-20 | End: 2023-07-23

## 2023-07-20 RX ORDER — LACTULOSE 10 G/15ML
20 SOLUTION ORAL 3 TIMES DAILY
Status: DISCONTINUED | OUTPATIENT
Start: 2023-07-20 | End: 2023-07-21

## 2023-07-20 RX ADMIN — ACETAMINOPHEN 650 MG: 325 TABLET ORAL at 06:56

## 2023-07-20 RX ADMIN — LACTULOSE 20 G: 20 SOLUTION ORAL at 17:35

## 2023-07-20 RX ADMIN — ACETAMINOPHEN 650 MG: 325 TABLET ORAL at 23:42

## 2023-07-20 RX ADMIN — ACETAMINOPHEN 650 MG: 325 TABLET ORAL at 12:34

## 2023-07-20 RX ADMIN — METOPROLOL SUCCINATE 25 MG: 25 TABLET, EXTENDED RELEASE ORAL at 20:51

## 2023-07-20 RX ADMIN — FINASTERIDE 5 MG: 5 TABLET, FILM COATED ORAL at 08:23

## 2023-07-20 RX ADMIN — OXYCODONE HYDROCHLORIDE 5 MG: 5 CAPSULE ORAL at 06:58

## 2023-07-20 RX ADMIN — OXYMETAZOLINE HCL 2 SPRAY: 0.05 SPRAY NASAL at 20:56

## 2023-07-20 RX ADMIN — LOSARTAN POTASSIUM 25 MG: 25 TABLET, FILM COATED ORAL at 08:23

## 2023-07-20 RX ADMIN — TAMSULOSIN HYDROCHLORIDE 0.4 MG: 0.4 CAPSULE ORAL at 17:35

## 2023-07-20 RX ADMIN — MENTHOL: 0 POWDER TOPICAL at 12:35

## 2023-07-20 RX ADMIN — Medication 2000 UNITS: at 17:35

## 2023-07-20 RX ADMIN — LACTULOSE 20 G: 20 SOLUTION ORAL at 20:51

## 2023-07-20 RX ADMIN — ASPIRIN 81 MG: 81 TABLET, COATED ORAL at 08:23

## 2023-07-20 RX ADMIN — Medication 100 MG: at 08:23

## 2023-07-20 RX ADMIN — ATORVASTATIN CALCIUM 20 MG: 20 TABLET, FILM COATED ORAL at 20:50

## 2023-07-20 RX ADMIN — SENNOSIDES AND DOCUSATE SODIUM 1 TABLET: 50; 8.6 TABLET ORAL at 08:23

## 2023-07-20 RX ADMIN — POLYETHYLENE GLYCOL 3350 17 G: 17 POWDER, FOR SOLUTION ORAL at 06:57

## 2023-07-20 RX ADMIN — SENNOSIDES AND DOCUSATE SODIUM 1 TABLET: 50; 8.6 TABLET ORAL at 20:51

## 2023-07-20 RX ADMIN — ASPIRIN 81 MG: 81 TABLET, COATED ORAL at 20:50

## 2023-07-20 RX ADMIN — ACETAMINOPHEN 650 MG: 325 TABLET ORAL at 17:36

## 2023-07-20 ASSESSMENT — PAIN DESCRIPTION - LOCATION
LOCATION: HIP
LOCATION: LEG;HIP;BUTTOCKS
LOCATION: LEG;HIP;BUTTOCKS
LOCATION: HIP
LOCATION: HIP
LOCATION: HIP;LEG

## 2023-07-20 ASSESSMENT — PAIN SCALES - GENERAL
PAINLEVEL_OUTOF10: 2
PAINLEVEL_OUTOF10: 3
PAINLEVEL_OUTOF10: 3
PAINLEVEL_OUTOF10: 5
PAINLEVEL_OUTOF10: 5
PAINLEVEL_OUTOF10: 2

## 2023-07-20 ASSESSMENT — PAIN DESCRIPTION - ORIENTATION
ORIENTATION: RIGHT

## 2023-07-20 ASSESSMENT — PAIN DESCRIPTION - DESCRIPTORS
DESCRIPTORS: ACHING
DESCRIPTORS: ACHING;DISCOMFORT
DESCRIPTORS: ACHING
DESCRIPTORS: ACHING;DISCOMFORT
DESCRIPTORS: ACHING;DISCOMFORT
DESCRIPTORS: ACHING

## 2023-07-20 ASSESSMENT — PAIN DESCRIPTION - PAIN TYPE: TYPE: SURGICAL PAIN

## 2023-07-20 NOTE — FLOWSHEET NOTE
Patient assessment complete, he is now resting in bed at this time with some complaints of right hip discomfort rated 1/10. Medicated patient with scheduled Tylenol 650 MG PO with HS medications. Patient was able to take all HS medications at once with sips of water without difficulty. Patient had chocolate ice cream for HS snack. Earlier in shift, the [email or web address] monitor was alarming and patient was attempting to get out of bed without help. Reminded patient that he needs to call for help since he had right hip surgery a few days ago and he has a hip brace on. PCA Breana Perdue assisted patient to ambulate to restroom with front wheeled walker and get ready for bed. Respiratory in room to set up nocturnal pulsox as ordered. Reminded patient to call for help if he has any needs this evening. Patient states he will call for help and is comfortable at this time. Bed alarm engaged and call light within reach.

## 2023-07-20 NOTE — PROGRESS NOTES
Occupational Therapy Get up and Go Note            Date: 2023  Patient Name: Fidelina Milton        MRN: [id number]    Account #: [de-identified]  : 1945  (66 y.o.)      Subjective:  Patient states: \"That anesthesia must have really done a number on me. I know I can be a jerk but not that big of a jerk. We're all squared away now. \"    Pain:  Pain at start of treatment: No    Pain at end of treatment: No    Objective:  ADL:  Grooming:  SBA in standing  Toileting:  SBA  Toilet transfers:  CGA with ww        Treatment consisted of:   [x] ADL Training  [] Strengthening   [] Transfer Training    [] DME Education  [] HEP   [] Patient Education  [] Other:    Safety:  Safety Devices  Safety Devices in place:   Type of devices:  All fall risk precautions in place      Therapy Time:   Individual Group Co-Treat   Time In 0750       Time Out 0801         Minutes 11             ADL/IADL trainin minutes         Electronically signed by:    GARETT Schaefer    2023, 8:04 AM

## 2023-07-20 NOTE — CONSULTS
Pulmonary Medicine  Consult Note      Reason for consultation: Nocturnal hypoxia    Consulting physician: Dr. Pereira Root:      This is 69-year-old male who was initially admitted on 7/17/2020 and underwent for right total hip arthroplasty. He is currently in rehab. He had overnight pulse oximetry done which shows O2 desaturation more than 30 minutes during sleep. Pulmonary was consulted regarding sleep apnea. Patient has complaint of snoring and daytime tiredness and sleepiness. He does take naps during the daytime. He denies having short of breath. No chest pain. No nausea vomiting diarrhea abdominal pain. He was started on 2 L O2 during sleep. Past Medical History:        Diagnosis Date    BPH (benign prostatic hypertrophy)     CAD (coronary artery disease)     Cerebrovascular disease     Chronic otitis externa 2/11/2016    Chronic suppurative otitis media 11/11/2015    Ear injury 2006    [address] [facility]    ED (erectile dysfunction) 11/28/2014    Hearing loss     History of nephrolithiasis     History of pneumonia 2004    History of prediabetes     Hyperlipidemia 10/10/2013    Hypertension     Hypogonadism in male     Mass of chest wall, left     Optic neuropathy, ischemic     Osteoarthritis of both hips     Paresthesias     Perforation of right tympanic membrane     chronic; Dr. Hellen Moscoso PSA (prostate specific antigen) 2007- 1.12    Seborrheic keratosis 10/10/2013    Sensorineural hearing loss, bilateral     Stable angina (HCC)     Trigger thumb, left thumb        Past Surgical History:        Procedure Laterality Date    CARPAL TUNNEL RELEASE Right 05/30/2017    CCRAVEN PEARL MD    CARPAL TUNNEL RELEASE Left 06/2017    CCRAVEN PEARL MD    COLONOSCOPY  6/2015    Dr. Jimy Rivera      lower eyelids    [address] RESECTION  3/22/16    DR. BARRAGAN    OTHER SURGICAL HISTORY Left 1/2015    hydrocelectomy

## 2023-07-20 NOTE — PROGRESS NOTES
Physical Therapy Rehab Treatment Note  Facility/Department: [facility] [place] [facility]  Room: [place] [address]       NAME: Lena Benton  : 1945 (54 y.o.)  MRN: [id number]  CODE STATUS: Full Code    Date of Service: 2023     Restrictions:  Restrictions/Precautions: Fall Risk, Weight Bearing  Lower Extremity Weight Bearing Restrictions  Right Lower Extremity Weight Bearing: Weight Bearing As Tolerated  Position Activity Restriction  Other position/activity restrictions: R hip abd brace. Avoid figure 4 R hip and active hip abd ROM exercises. SUBJECTIVE:   Subjective: I am doing alright    Pain  Pain: 3/10 R hip achy pain medicated prior to session    OBJECTIVE:     Roll Left  Assistance Level: Supervision  Roll Right  Skilled Clinical Factors: Not tested due to recent surgery  Sit to Supine  Assistance Level: Supervision  Supine to Sit  Assistance Level: Stand by assist  Skilled Clinical Factors: Cues for technique  Scooting  Assistance Level: Supervision    Sit to Stand  Assistance Level: Supervision  Stand to Sit  Assistance Level: Supervision  Bed To/From Chair  Technique: Stand step  Assistance Level: Stand by assist;Contact guard assist  Skilled Clinical Factors: Cues for proper technique attempts to sit prior to completing turn    Ambulation  Surface: Level surface  Device: Rolling walker  Distance: 120' x 2  Activity: Within Unit  Activity Comments: Reciprocal pattern forward flexed posture cues for walker proximity  Additional Factors: Verbal cues  Assistance Level: Supervision  Gait Deviations: Slow marlon;Decreased trunk rotation  Skilled Clinical Factors: Good ability to negotiate around obstacles    Stairs  Stair Height: 6''  Device: Bilateral handrails  Number of Stairs: 8  Additional Factors: Verbal cues; Non-reciprocal going up;Non-reciprocal going down  Assistance Level: Stand by assist  Skilled Clinical Factors: Cues for proper technique     PT Exercises  A/AROM Exercises: Supine exercises: [employer], Glute sets, Heel slides Straight leg raise AAROM RLE x 15 each     Activity Tolerance  Activity Tolerance: Patient tolerated treatment well    ASSESSMENT/PROGRESS TOWARDS GOALS:   Assessment  Assessment: Patient continues to show fair to good safety with gait transfers and bed mobility. Continued cues for sequencing stair negotiation and bed mobility  Activity Tolerance: Patient tolerated treatment well  Discharge Recommendations: Continue to assess pending progress; Patient would benefit from continued therapy after discharge    Goals:  Long Term Goals  Long Term Goal 1: Pt to complete bed mobility indep and efficiently  Long Term Goal 2: Pt to complete bed, sit to stand and car transfers  indep  Long Term Goal 3: Pt to ambulate 50-150ft with appropriate device and indep  Long Term Goal 4: Pt to complete 3 steps with HR and SBA  Long Term Goal 5: Pt to complete HEP with indep    PLAN OF CARE/Safety:   Safety Devices  Type of Devices: Chair alarm in place; Left in chair    Therapy Time:   Individual   Time In 1430   Time Out 1530   Minutes 60      Minutes: 60  Transfer/Bed mobility training: 15  Gait trainin  Therapeutic ex: Lucas Garibay PTA, 23 at 4:27 PM

## 2023-07-20 NOTE — PROGRESS NOTES
INDIVIDUALIZED OVERALL REHAB PLAN OF CARE  ADDENDUM TO REHAB PROGRESS NOTE-for audit purposes must also refer to this day's clinical note and combine the information      Date: 2023  Patient Name: Rubina Aguayo   Room: P989/X995-29    MRN: [id number]    : 1945  (66 y.o.)  Gender: male       Today 2023 during weekly team meeting, I reviewed the patient Rubina Aguayo in detail with the therapists and nurses involved in patient's care gathering complex physiatric data regarding current medical issues, progress in therapies, factors limiting progress, social issues, psychological issues, ongoing therapeutic plans and discharge planning. Legend:  I= independent Im =Modified independent  S=Supervised SB=stand by LOPEZ=set up CG=contact jr Min= minimal Mod=Moderate Max=maximal Max of 2 =maximal assist of 2 people      CURRENT FUNCTIONAL STATUS:    He is recovering from:          Procedure Summary      Date: 23 Room / Location: [address]     Anesthesia Start: 1037 Anesthesia Stop:      Procedure: Right total hip arthroplasty. (Right: Hip)-Right total hip arthroplasty. 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 Postop he is suffering from encephalopathy and severe postop pain. 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 He is to have an abduction brace in place that at 0 to 5 degrees of abduction is 0 to 90 degrees of flexion. Postop he has had confusion and agitation likely secondary to a change in his usual routine with baseline cognitive deficits.   We are limiting sedating weeks  Goal 1: Pt will demonstrate functional cognitive-linguistic abilities in all opportunities with modified independence in order to safely complete ADLs. From a cognitive standpoint they will need:        24 hr vs daily transitioning to supervision  -->progress to occasional             Significant problems/ barriers to functional progress include: Pt is at a high risk for functional loss,      []  Acute infection/UTI    []  Low BP's     []  COPD flare-up   []  Uncontrolled blood sugar     []  Progressive anemia     [x]  poor endurance    [x]  Severe pain     [x]  Impaired mental status    []  Urinary incontinence    []  Bowel incontinence           Plan to correct barriers to functional progress: Add scheduled rest breaks, CoQ 10 and Vit B 12, control pain by using ice Lidoderm rest and massage as well as pain medications prior to therapy. Spread therapy of 15 hours out over a 7 day window to accommodate rest breaks and medical interventions. Patient seems to be making fair to good response to these interventions. Based on a comprehensive evaluation of the above, the individualized therapy and Discharge plan will be:    -Times stated are an average that will be varied based on the patient's daily need. PT   1 1/2  hrs/day 5-7 days per wk      OT   1 1/2 hrs per day 5-7 days per wk     ST  1/2    hrs /day 3-5 days per week       Estimated LOS   1-2 week(s)    -Overall functional prognosis:     [x]  Good    []  Fair    []  Poor     -Medical Prognosis:   [x]  Good    []  Fair    []  Poor    This patient was made aware of the discussion of Plan of Care, their projected dicharge date and their projected function at discharge.          Jeancarlos Moscoso DO

## 2023-07-20 NOTE — PROGRESS NOTES
Physical Therapy Rehab Treatment Note  [facility]  [facility]       NAME: Juan M Lopez  : 1945 (66 y.o.)  MRN: [id number]  CODE STATUS: Full Code    Date of Service: 2023     Restrictions:  Restrictions/Precautions: Fall Risk, Weight Bearing  Lower Extremity Weight Bearing Restrictions  Right Lower Extremity Weight Bearing: Weight Bearing As Tolerated  Position Activity Restriction  Other position/activity restrictions: R hip abd brace. Avoid figure 4 R hip and active hip abd ROM exercises. SUBJECTIVE:   Subjective: I am doing alright    Pain  Pain: 3/10 R hip achy pain medicated prior to session    OBJECTIVE:     Roll Left  Assistance Level: Supervision  Roll Right  Skilled Clinical Factors: Not tested due to recent surgery  Sit to Supine  Assistance Level: Supervision  Supine to Sit  Assistance Level: Supervision  Scooting  Assistance Level: Supervision    Sit to Stand  Assistance Level: Supervision  Stand to Sit  Assistance Level: Supervision    Ambulation  Surface: Level surface  Device: Rolling walker  Distance: 120' x  4  Activity: Within Unit  Activity Comments: Reciprocal pattern forward flexed posture cues for walker proxiity  Additional Factors: Verbal cues  Assistance Level: Supervision  Gait Deviations: Slow marlon;Decreased trunk rotation  Skilled Clinical Factors: Good ability to negotiate around obstacles    Stairs  Stair Height: 6''  Device: Bilateral handrails  Number of Stairs: 8  Additional Factors: Verbal cues; Non-reciprocal going up;Non-reciprocal going down  Assistance Level: Stand by assist  Skilled Clinical Factors: Cues for proper technique    PT Exercises  Exercise Treatment: Seated exercises: Long arc quads, Hip flexion to 90, HS curls RTB x 15 each     ASSESSMENT/PROGRESS TOWARDS GOALS:   Assessment  Assessment: Patient with improved safety and requires dereased assistance with all mobility.  Patient states he feel he is ready to discharge at

## 2023-07-20 NOTE — PROGRESS NOTES
[facility]  Facility/Department: [facility]  Speech Language Pathology   Treatment Note          Rubina Aguayo  1945  R250/R250-01  [x]   confirmed    Date: 2023      Restrictions/Precautions: Fall Risk, Weight Bearing  Lower Extremity Weight Bearing Restrictions  Right Lower Extremity Weight Bearing: Weight Bearing As Tolerated  Position Activity Restriction  Other position/activity restrictions: R hip abd brace. Avoid figure 4 R hip and active hip abd ROM exercises. Weight - Scale: 237 lb 12.8 oz (107.9 kg)     ADULT DIET; Regular    SpO2: 95 % (23 0849)  No active isolations    Rehab Diagnosis: Impaired mobility and ADL's due to Right total hip arthroplasty. 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 Patient completed 3 item immediate recall task with category inclusion question I with the following accuracy  3 word recall: I with 100% accuracy  Category inclusion: I with 87% accuracy, with repetition 100% accuracy    Goal 3: To decrease pt's cognitive deficits through the use of compensatory strategies, the pt will be educated on 3 different memory strategies and verbalize how he might use them at home in 3 ways with modified I cues. Patient was provided with memory strategy info sheet. Patient reported he utilizes a calendar and places important items in the same location. Patient reported that he does not have an interest in utilizing a medication organizer at home. Patient stated that he takes all his meds at night time. ST inquired if it was recommended for the patient to take his meds at night? Patient stated \"I don't know, but I do this so I remember. \"    Goal 4: Within 1-5 days of implementing the ST POC, pt's functional reading/writing skills will be assessed in relation to executive functioning (e.g. bill paying, reading rx labels, completing personal information), and problems solving with additional goals added to pt's POC as deemed necesary by treating SLP. Informal Acute Rehab Cognitive-Linguistic Evaluation    Results of Cognitive-linguistic Evaluation Total Score for each section Percentage Score Severity Rating for each section   Problem Solving/Sequencing 7/10 70 Mod   Executive Function 10/10 100 WNL     Overall Cognitive-Linguistic Severity Rating 86% Mild deficits     Problem solving and Executive function portion of the Informal cognitive eval were completed. Patient presented with overall mild problem solving deficits. ST suspects reduced motivation negatively impacted scores. Patient presented with overall mild cognitive-linguistic deficits. NEW GOAL:   To increase safety awareness and judgment for safe completion of ADLs secondary to pt's cognitive deficits,  pt will complete high level problem solving tasks related to ADLs (e.g. home and community safety) with 100% accuracy and supervised cues. Treatment/Activity Tolerance:  Patient tolerated treatment well    Plan:  Continue per POC    Pain Assessment:  Patient c/o pain: Location and pain level: pain level 2 in right hip. Per patient he already received pain medication and it is helping    Pain Re-assessment:  Patient c/o pain: Pt able to proceed with session. Patient/Caregiver Education:  Patient educated on session and progression towards goals.     Safety Devices:  Bed alarm in place and Call light within reach    Speech Therapy Level of Assistance Scale    AUDITORY COMPREHENSION  [address]

## 2023-07-20 NOTE — PLAN OF CARE
Problem: Discharge Planning  Goal: Discharge to home or other facility with appropriate resources  Outcome: Progressing     Problem: Safety - Adult  Goal: Free from fall injury  Outcome: Progressing     Problem: Pain  Goal: Verbalizes/displays adequate comfort level or baseline comfort level  Outcome: Progressing     Problem: Confusion  Goal: Confusion, delirium, dementia, or psychosis is improved or at baseline  Description: INTERVENTIONS:  1. Assess for possible contributors to thought disturbance, including medications, impaired vision or hearing, underlying metabolic abnormalities, dehydration, psychiatric diagnoses, and notify attending LIP  2. [place] high risk fall precautions, as indicated  3. Provide frequent short contacts to provide reality reorientation, refocusing and direction  4. Decrease environmental stimuli, including noise as appropriate  5. Monitor and intervene to maintain adequate nutrition, hydration, elimination, sleep and activity  6. If unable to ensure safety without constant attention obtain sitter and review sitter guidelines with assigned personnel  7. Initiate Psychosocial CNS and Spiritual Care consult, as indicated  Outcome: Progressing  Flowsheets (Taken 7/19/2023 2220 by Armond Stephenson RN)  Effect of thought disturbance (confusion, delirium, dementia, or psychosis) are managed with adequate functional status: [place] high risk fall precautions, as indicated     Problem: Skin/Tissue Integrity  Goal: Absence of new skin breakdown  Description: 1. Monitor for areas of redness and/or skin breakdown  2. Assess vascular access sites hourly  3. Every 4-6 hours minimum:  Change oxygen saturation probe site  4. Every 4-6 hours:  If on nasal continuous positive airway pressure, respiratory therapy assess nares and determine need for appliance change or resting period.   Outcome: Progressing

## 2023-07-20 NOTE — PROGRESS NOTES
Seen by pulmonology, needs to wear 2L NC at HS. Will have follow up overnight pulse ox study prior to d/c. Refused Afrin nose spray ordered. [place] ordered for rash to lower back near above brace. Aquacel CDI to right hip. Electronically signed by Shi Spence RN on 7/20/23 at 2:13 PM EDT     Lactulose given for LBM 7/16.  Electronically signed by Shi Spence RN on 7/20/23 at 5:44 PM EDT

## 2023-07-20 NOTE — CARE COORDINATION
ELIZABETHW called Amy Norman (son) to provide an update and was unable to leave a voicemail due to voicemail box being full. Electronically signed by GERMAINE Soto LSW on 7/20/2023 at 11:26 AM    LSW notified pt of projected discharge date and he is in agreement. No questions or concerns noted.  Electronically signed by GERMAINE Soto LSW on 7/20/2023 at 1:27 PM

## 2023-07-20 NOTE — PROGRESS NOTES
OCCUPATIONAL THERAPY  INPATIENT REHAB TREATMENT NOTE  [facility]      NAME: Guanako Walsh  : 1945 (82 y.o.)  MRN: [id number]  CODE STATUS: Full Code  Room: R250/R250-01    Date of Service: 2023    Referring Physician: Dr. Olena Ewing  Rehab Diagnosis: Imp ADLs d/t R total hip arthroplasty. Also a repair of the chronic tear of the gluteus minimus and medius    Restrictions  Restrictions/Precautions  Restrictions/Precautions: Fall Risk, Weight Bearing  Required Braces or Orthoses?: Yes   Lower Extremity Weight Bearing Restrictions  Right Lower Extremity Weight Bearing: Weight Bearing As Tolerated    Position Activity Restriction  Other position/activity restrictions: R hip abd brace. Avoid figure 4 R hip and active hip abd ROM exercises. Patient's date of birth confirmed: Yes    SAFETY:  Safety Devices  Safety Devices in place: Yes  Type of devices: All fall risk precautions in place    SUBJECTIVE: \"I just showered yesterday so I'd like to just wash up today. \"    Pain at start of treatment: Yes: 3/10    Pain at end of treatment: Yes: 3/10    Location: R hip  Description ache  Nursing notified: Declined  Intervention: None    COGNITION:  Orientation  Overall Orientation Status: Within Functional Limits  Cognition  Overall Cognitive Status: WFL    OBJECTIVE:    Patient noted to have a rash on back that patient states is itchy, Minna RN notified and assessed.     Grooming/Oral Hygiene  Assistance Level: Supervision  Upper Extremity Bathing  Assistance Level: Supervision;Verbal cues  Skilled Clinical Factors: verbal cues for doffing/donning orthosis  Lower Extremity Bathing  Equipment Provided: Long-handled sponge  Assistance Level: Stand by assist;Verbal cues  Skilled Clinical Factors: verbal cues for doffing/donning orthosis  Upper Extremity Dressing  Assistance Level: Supervision  Lower Extremity Dressing  Equipment Provided: Reachers;Dressing stick  Assistance Level: Stand by assist;Verbal

## 2023-07-21 PROCEDURE — 99232 SBSQ HOSP IP/OBS MODERATE 35: CPT | Performed by: PHYSICAL MEDICINE & REHABILITATION

## 2023-07-21 PROCEDURE — 97129 THER IVNTJ 1ST 15 MIN: CPT

## 2023-07-21 PROCEDURE — 1180000000 HC REHAB R&B

## 2023-07-21 PROCEDURE — 6370000000 HC RX 637 (ALT 250 FOR IP): Performed by: PHYSICAL MEDICINE & REHABILITATION

## 2023-07-21 PROCEDURE — 97110 THERAPEUTIC EXERCISES: CPT

## 2023-07-21 PROCEDURE — 97535 SELF CARE MNGMENT TRAINING: CPT

## 2023-07-21 PROCEDURE — 97130 THER IVNTJ EA ADDL 15 MIN: CPT

## 2023-07-21 PROCEDURE — 6370000000 HC RX 637 (ALT 250 FOR IP): Performed by: NURSE PRACTITIONER

## 2023-07-21 PROCEDURE — 97116 GAIT TRAINING THERAPY: CPT

## 2023-07-21 RX ORDER — LIDOCAINE 4 G/G
3 PATCH TOPICAL DAILY PRN
Status: DISCONTINUED | OUTPATIENT
Start: 2023-07-21 | End: 2023-07-26 | Stop reason: HOSPADM

## 2023-07-21 RX ORDER — LACTULOSE 10 G/15ML
20 SOLUTION ORAL 2 TIMES DAILY PRN
Status: DISCONTINUED | OUTPATIENT
Start: 2023-07-21 | End: 2023-07-26 | Stop reason: HOSPADM

## 2023-07-21 RX ADMIN — Medication 2000 UNITS: at 17:19

## 2023-07-21 RX ADMIN — LOSARTAN POTASSIUM 25 MG: 25 TABLET, FILM COATED ORAL at 08:17

## 2023-07-21 RX ADMIN — SENNOSIDES AND DOCUSATE SODIUM 1 TABLET: 50; 8.6 TABLET ORAL at 08:12

## 2023-07-21 RX ADMIN — Medication 100 MG: at 08:11

## 2023-07-21 RX ADMIN — ASPIRIN 81 MG: 81 TABLET, COATED ORAL at 22:01

## 2023-07-21 RX ADMIN — ASPIRIN 81 MG: 81 TABLET, COATED ORAL at 08:12

## 2023-07-21 RX ADMIN — MENTHOL: 0 POWDER TOPICAL at 11:02

## 2023-07-21 RX ADMIN — SENNOSIDES AND DOCUSATE SODIUM 1 TABLET: 50; 8.6 TABLET ORAL at 22:02

## 2023-07-21 RX ADMIN — OXYMETAZOLINE HCL 2 SPRAY: 0.05 SPRAY NASAL at 08:02

## 2023-07-21 RX ADMIN — OXYMETAZOLINE HCL 2 SPRAY: 0.05 SPRAY NASAL at 22:04

## 2023-07-21 RX ADMIN — METOPROLOL SUCCINATE 25 MG: 25 TABLET, EXTENDED RELEASE ORAL at 22:03

## 2023-07-21 RX ADMIN — OXYCODONE HYDROCHLORIDE 10 MG: 5 CAPSULE ORAL at 11:00

## 2023-07-21 RX ADMIN — TAMSULOSIN HYDROCHLORIDE 0.4 MG: 0.4 CAPSULE ORAL at 17:19

## 2023-07-21 RX ADMIN — ATORVASTATIN CALCIUM 20 MG: 20 TABLET, FILM COATED ORAL at 22:02

## 2023-07-21 RX ADMIN — FINASTERIDE 5 MG: 5 TABLET, FILM COATED ORAL at 08:11

## 2023-07-21 RX ADMIN — ACETAMINOPHEN 650 MG: 325 TABLET ORAL at 13:34

## 2023-07-21 RX ADMIN — MENTHOL: 0 POWDER TOPICAL at 22:02

## 2023-07-21 RX ADMIN — ACETAMINOPHEN 650 MG: 325 TABLET ORAL at 19:07

## 2023-07-21 ASSESSMENT — PAIN DESCRIPTION - ORIENTATION
ORIENTATION: RIGHT

## 2023-07-21 ASSESSMENT — PAIN DESCRIPTION - DESCRIPTORS
DESCRIPTORS: SORE
DESCRIPTORS: ACHING

## 2023-07-21 ASSESSMENT — PAIN SCALES - GENERAL
PAINLEVEL_OUTOF10: 1
PAINLEVEL_OUTOF10: 5
PAINLEVEL_OUTOF10: 2

## 2023-07-21 ASSESSMENT — PAIN DESCRIPTION - LOCATION
LOCATION: HIP

## 2023-07-21 NOTE — PLAN OF CARE
Problem: Discharge Planning  Goal: Discharge to home or other facility with appropriate resources  7/20/2023 2247 by Lynn Brown RN  Outcome: Progressing     Problem: Safety - Adult  Goal: Free from fall injury  7/20/2023 2247 by Lynn Brown RN  Outcome: Progressing     Problem: Pain  Goal: Verbalizes/displays adequate comfort level or baseline comfort level  7/20/2023 2247 by Lynn Brown RN  Outcome: Progressing     Problem: Confusion  Goal: Confusion, delirium, dementia, or psychosis is improved or at baseline  Description: INTERVENTIONS:  1. Assess for possible contributors to thought disturbance, including medications, impaired vision or hearing, underlying metabolic abnormalities, dehydration, psychiatric diagnoses, and notify attending LIP  2. [place] high risk fall precautions, as indicated  3. Provide frequent short contacts to provide reality reorientation, refocusing and direction  4. Decrease environmental stimuli, including noise as appropriate  5. Monitor and intervene to maintain adequate nutrition, hydration, elimination, sleep and activity  6. If unable to ensure safety without constant attention obtain sitter and review sitter guidelines with assigned personnel  7. Initiate Psychosocial CNS and Spiritual Care consult, as indicated  7/20/2023 2247 by yLnn Brown RN  Outcome: Progressing     Problem: Skin/Tissue Integrity  Goal: Absence of new skin breakdown  Description: 1. Monitor for areas of redness and/or skin breakdown  2. Assess vascular access sites hourly  3. Every 4-6 hours minimum:  Change oxygen saturation probe site  4. Every 4-6 hours:  If on nasal continuous positive airway pressure, respiratory therapy assess nares and determine need for appliance change or resting period.   7/20/2023 2247 by Lynn Brown RN  Outcome: Progressing

## 2023-07-21 NOTE — PROGRESS NOTES
OCCUPATIONAL THERAPY  INPATIENT REHAB TREATMENT NOTE  [facility]      NAME: Rubina Aguayo  : 1945 (46 y.o.)  MRN: [id number]  CODE STATUS: Full Code  [facility]    Date of Service: 2023    Referring Physician: Dr. Brooklynn Canela  Rehab Diagnosis: Imp ADLs d/t R total hip arthroplasty. 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 Avoid figure 4 R hip and active hip abd ROM exercises. Patient's date of birth confirmed: Yes    SAFETY:  Safety Devices  Safety Devices in place: Yes  Type of devices: All fall risk precautions in place    SUBJECTIVE:  Subjective: \"I feel pretty good. I finally had a bowel movement last night\"    Pain at start of treatment: No    Pain at end of treatment: No    Location:   Description   Nursing notified: Not Applicable  RN:   Intervention: Repositioned    COGNITION:  Orientation  Overall Orientation Status: Within Functional Limits  Cognition  Overall Cognitive Status: WFL      Pt's current cognitive status is:  Comprehension: Mod I  Expression: Mod I  Social Interaction: Mod I  Problem Solving: Mod I  Memory: Mod I    OBJECTIVE:    Feeding  Assistance Level: Independent  Grooming/Oral Hygiene  Assistance Level:  Independent  Upper Extremity Bathing  Assistance Level: Set-up  Skilled Clinical Factors: verbal cues for doffing/donning orthosis  Lower Extremity Bathing  Assistance Level: Stand by assist;Verbal cues  Skilled Clinical Factors: Did not doff orthosis fully, only opened brace to don HUMZA hose and wash underneath  Upper Extremity Dressing  Assistance Level: Set-up  Lower Extremity Dressing  Equipment activity  Continue per OT POC for planned discharge on 23    Patient goals : Get back home, get back to doing what I do           Therapy Time:   Individual Group Co-Treat   Time In 930       Time Out 1030         Minutes 60         ADL/IADL trainin minutes     Electronically signed by:     VIKTOR Bellamy/L,   2023, 4:19 PM

## 2023-07-21 NOTE — CARE COORDINATION
LSW spoke with Esperanza Fine (son) and discussed progress as well as goals. Esperanza Fine is pleased and is aware of planned discharge date. Esperanza Fine noted that he has noticed pt's improved cognition and stated that he is almost at baseline. Esperanza Fine confirmed that his wife can stay with pt at home during the day temporarily upon discharge if recommended. LSW will follow up with team, but noted to Esperanza Fine that it would be a good idea. LSW discussed family training and Esperanza Fine stated that he is unsure of his schedule next week, LSW to follow up on Monday.  Electronically signed by GERMAINE Montelongo, WADE on 7/21/2023 at 3:25 PM

## 2023-07-21 NOTE — PROGRESS NOTES
Reviewed and agree with orienting LPN's charting. VSS. 2L of oxygen at HS- failed nocturnal p/o night prior. Continues with scheduled tylenol. LBM 7/20. Hip brace on at all times, WBAT when up. Aquacell c/d/I. AVASYS maintained for patient safety due to impulsivity. No distress noted.  Call light within reach and bed alarm activated  Electronically signed by Jessica Maldonado RN on 7/21/2023 at 7:23 AM  .

## 2023-07-21 NOTE — PROGRESS NOTES
Patient assessment complete. A&O x4. AVASYS monitor currently in room. Client encouraged to use their call light he has any needs this evening. Patient states he will call for help and is comfortable at this time. Bed alarm engaged and call light within reach. Patient had a BM was not able to assess.

## 2023-07-21 NOTE — PROGRESS NOTES
[facility]  Facility/Department: [facility]  Speech Language Pathology   Treatment Note          Jakub Livings  1945  R250/R250-01  [x]   confirmed    Date: 2023      Restrictions/Precautions: Fall Risk, Weight Bearing  Lower Extremity Weight Bearing Restrictions  Right Lower Extremity Weight Bearing: Weight Bearing As Tolerated  Position Activity Restriction  Other position/activity restrictions: R hip abd brace. Avoid figure 4 R hip and active hip abd ROM exercises. Weight - Scale: 237 lb 12.8 oz (107.9 kg)     ADULT DIET; Regular    SpO2: 93 % (23)  O2 Flow Rate (L/min): 0 L/min (23)  No active isolations    Rehab Diagnosis: Impaired mobility and ADL's due to Right total hip arthroplasty. 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 Patient completed written direction task I with 80% accuracy. Goal 2: To address pt's cognitive deficits and promote recall of personal and medical information, pt will answer questions addressing (remote, recent, delayed) recall with 90% accuracy and min cues. With use of memory strategies the patient completed 4 items recall task with category inclusion question I with the following accuracy  4 item recall: I with 83% accuracy, with request for repetition 100%  Category Inclusion: I with 100% accuracy.     Goal 3: To decrease pt's cognitive deficits through the use of compensatory strategies, the pt will be educated on 3 different memory strategies and verbalize how he might use them at home in 3 ways with modified I cues. Patient was re-educated on repeating out cathie and asking caregivers to repeat. Goal 4: To increase safety awareness and judgment for safe completion of ADLs secondary to pt's cognitive deficits,  pt will complete high level problem solving tasks related to ADLs (e.g. home and community safety) with 100% accuracy and supervised cues. Patient completed bill and coin addition task I with 100% accuracy    Patient completed a calendar activity I with 87% accuracy, with min cueing 100% accuracy. Treatment/Activity Tolerance:  Patient tolerated treatment well    Plan:  Continue per POC    Pain Assessment:  Patient c/o pain: Location and pain level: pain level 3 right hip. Patient received pain medications in PT session prior to ST    Pain Re-assessment:  Patient c/o pain: Pt able to proceed with session. Patient/Caregiver Education:  Patient educated on session and progression towards goals.     Safety Devices:  Chair alarm in place    Speech Therapy Level of Assistance Scale    AUDITORY COMPREHENSION  Rating:  Modified Independent    VERBAL EXPRESSION  Rating:  Modified Independent    MOTOR SPEECH  Rating: Independent    PROBLEM SOLVING  Rating: Supervised Assistance    MEMORY  Rating:  Supervised Assistance        Therapy Time  SLP Individual Minutes  Time In: 1130  Time Out: 1200  Minutes: 30            Signature: Electronically signed by FOX Fuentes on 7/21/2023 at 11:36 AM

## 2023-07-21 NOTE — PROGRESS NOTES
Physical Therapy Rehab Treatment Note  [facility]  [facility]       NAME: Soumya Victoria  : 1945 (26 y.o.)  MRN: [id number]  CODE STATUS: Full Code    Date of Service: 2023     Restrictions:  Restrictions/Precautions: Fall Risk, Weight Bearing  Lower Extremity Weight Bearing Restrictions  Right Lower Extremity Weight Bearing: Weight Bearing As Tolerated  Position Activity Restriction  Other position/activity restrictions: R hip abd brace. Avoid figure 4 R hip and active hip abd ROM exercises. SUBJECTIVE:   Subjective: \"I had a bad night. I only got an hour of good sleep. \"  Pt reports he hasn't had any pain meds yet today. States he had a BM. Pain  Pain: 5/10 R hip Pt declined intervention    OBJECTIVE:         Bed mobility  Rolling to Left: Stand by assistance  Supine to Sit: Stand by assistance  Sit to Supine: Supervision  Bed Mobility Comments: VCs for sequencing. Completed to L side to simulate home set up. Transfers  Sit to Stand: Supervision  Stand to Sit: Supervision    Ambulation  Surface: Level tile  Device: Rolling Walker  Other Apparatus:  (hip abd brace)  Assistance: Stand by assistance  Quality of Gait: Pt ambulated posterior to [employer] with fwd flexed posture and heavy bracing through UEs. VCs for posture and approximation to [employer]. Decreased WBing on R LE. Pronated L foot. Distance: [id number]  Comments: VCs for posture and to look ahead with improved follow through. PT Exercises  A/AROM Exercises: supine heel slides 2x10, SAQ x20, bridges x10, hooklying hip add with ball x20  Standing Open/Closed Kinetic Chain Exercises: heel raises and march x10 ea             ASSESSMENT/PROGRESS TOWARDS GOALS:   Assessment: Pt is overall progressing towards goals tolerating increased gait distance and decreased assist.  Bed mobility improved to L side vs R. Improving posture in gait noted with cues.     Goals:  Long Term Goals  Long Term Goal 1: Pt to complete bed

## 2023-07-21 NOTE — PROGRESS NOTES
Physical Therapy Rehab Treatment Note  [facility]Department[facility]  [facility]       NAME: Owen Grayson  : 1945 (26 y.o.)  MRN: [id number]  CODE STATUS: Full Code    Date of Service: 2023     Restrictions:  Restrictions/Precautions: Fall Risk, Weight Bearing  Lower Extremity Weight Bearing Restrictions  Right Lower Extremity Weight Bearing: Weight Bearing As Tolerated  Position Activity Restriction  Other position/activity restrictions: R hip abd brace. Avoid figure 4 R hip and active hip abd ROM exercises. SUBJECTIVE:   Subjective: \"I had a bad night. I only got an hour of good sleep. \"  Pt reports he hasn't had any pain meds yet today. States he had a BM. Pain  Pain: Pt denies pain at rest.  Reports 5/10 R hip pain with mobility. RN notified and medicated pt during session. 3/10 post session. CP during therex x15 min    OBJECTIVE:            Transfers  Sit to Stand: Supervision  Stand to Sit: Supervision  Car Transfer: Minimal Assistance (Instructed pt on car transfer technique. Assist for R LE out of car. Pt reports he will be picked up in in [place].)    Ambulation  Surface: Level tile  Device: Rolling Walker  Other Apparatus:  (hip abd brace)  Assistance: Stand by assistance  Quality of Gait: Pt ambulated posterior to [employer] with fwd flexed posture and heavy bracing through UEs. VCs for posture and approximation to [employer]. Decreased WBing on R LE. Pronated L foot. Distance: 150ft  Comments: VCs for posture and to look ahead with improved follow through. Stairs/Curb  Stairs?: Yes  Stairs  # Steps : 8  Stairs Height: 6\"  Rails: Right ascending  Assistance: Stand by assistance;Contact guard assistance  Comment: Instructed pt on B grasp on R rail and utilizing L st cane and R rail techniques to simulate home enterance. Pt completed both techniques. Reports feeling most comfortable with B grasp on R rail.         PT Exercises  Exercise Treatment: CP applied to R hip during seated

## 2023-07-22 PROCEDURE — 6370000000 HC RX 637 (ALT 250 FOR IP): Performed by: PHYSICAL MEDICINE & REHABILITATION

## 2023-07-22 PROCEDURE — 97116 GAIT TRAINING THERAPY: CPT

## 2023-07-22 PROCEDURE — 97530 THERAPEUTIC ACTIVITIES: CPT

## 2023-07-22 PROCEDURE — 97535 SELF CARE MNGMENT TRAINING: CPT

## 2023-07-22 PROCEDURE — 97110 THERAPEUTIC EXERCISES: CPT

## 2023-07-22 PROCEDURE — 1180000000 HC REHAB R&B

## 2023-07-22 PROCEDURE — 6370000000 HC RX 637 (ALT 250 FOR IP): Performed by: NURSE PRACTITIONER

## 2023-07-22 RX ADMIN — SENNOSIDES AND DOCUSATE SODIUM 1 TABLET: 50; 8.6 TABLET ORAL at 19:39

## 2023-07-22 RX ADMIN — ACETAMINOPHEN 650 MG: 325 TABLET ORAL at 12:11

## 2023-07-22 RX ADMIN — OXYMETAZOLINE HCL 2 SPRAY: 0.05 SPRAY NASAL at 10:13

## 2023-07-22 RX ADMIN — ACETAMINOPHEN 650 MG: 325 TABLET ORAL at 04:17

## 2023-07-22 RX ADMIN — SENNOSIDES AND DOCUSATE SODIUM 1 TABLET: 50; 8.6 TABLET ORAL at 10:12

## 2023-07-22 RX ADMIN — TAMSULOSIN HYDROCHLORIDE 0.4 MG: 0.4 CAPSULE ORAL at 17:47

## 2023-07-22 RX ADMIN — ACETAMINOPHEN 650 MG: 325 TABLET ORAL at 17:47

## 2023-07-22 RX ADMIN — MENTHOL: 0 POWDER TOPICAL at 10:17

## 2023-07-22 RX ADMIN — METOPROLOL SUCCINATE 25 MG: 25 TABLET, EXTENDED RELEASE ORAL at 19:42

## 2023-07-22 RX ADMIN — Medication 100 MG: at 10:12

## 2023-07-22 RX ADMIN — LOSARTAN POTASSIUM 25 MG: 25 TABLET, FILM COATED ORAL at 10:12

## 2023-07-22 RX ADMIN — MENTHOL: 0 POWDER TOPICAL at 19:39

## 2023-07-22 RX ADMIN — ASPIRIN 81 MG: 81 TABLET, COATED ORAL at 10:12

## 2023-07-22 RX ADMIN — ASPIRIN 81 MG: 81 TABLET, COATED ORAL at 19:38

## 2023-07-22 RX ADMIN — FINASTERIDE 5 MG: 5 TABLET, FILM COATED ORAL at 10:12

## 2023-07-22 RX ADMIN — Medication 2000 UNITS: at 17:13

## 2023-07-22 RX ADMIN — OXYCODONE HYDROCHLORIDE 5 MG: 5 CAPSULE ORAL at 04:14

## 2023-07-22 RX ADMIN — OXYCODONE HYDROCHLORIDE 5 MG: 5 CAPSULE ORAL at 10:12

## 2023-07-22 RX ADMIN — ATORVASTATIN CALCIUM 20 MG: 20 TABLET, FILM COATED ORAL at 19:39

## 2023-07-22 RX ADMIN — OXYMETAZOLINE HCL 2 SPRAY: 0.05 SPRAY NASAL at 19:43

## 2023-07-22 ASSESSMENT — PAIN SCALES - GENERAL
PAINLEVEL_OUTOF10: 4
PAINLEVEL_OUTOF10: 0
PAINLEVEL_OUTOF10: 4
PAINLEVEL_OUTOF10: 3

## 2023-07-22 ASSESSMENT — PAIN DESCRIPTION - LOCATION
LOCATION: HIP

## 2023-07-22 ASSESSMENT — PAIN DESCRIPTION - DESCRIPTORS
DESCRIPTORS: ACHING

## 2023-07-22 ASSESSMENT — PAIN - FUNCTIONAL ASSESSMENT: PAIN_FUNCTIONAL_ASSESSMENT: PREVENTS OR INTERFERES WITH MANY ACTIVE NOT PASSIVE ACTIVITIES

## 2023-07-22 ASSESSMENT — PAIN DESCRIPTION - ORIENTATION
ORIENTATION: RIGHT

## 2023-07-22 NOTE — PLAN OF CARE
Problem: Discharge Planning  Goal: Discharge to home or other facility with appropriate resources  Outcome: Progressing     Problem: Pain  Goal: Verbalizes/displays adequate comfort level or baseline comfort level  Recent Flowsheet Documentation  Taken 7/22/2023 1012 by Severiano Ventura RN  Verbalizes/displays adequate comfort level or baseline comfort level: Encourage patient to monitor pain and request assistance

## 2023-07-22 NOTE — PROGRESS NOTES
OCCUPATIONAL THERAPY  INPATIENT REHAB TREATMENT NOTE  [facility]      NAME: Fahad Meng  : 1945 (57 y.o.)  MRN: [id number]  CODE STATUS: Full Code  Room: R250/R250-01    Date of Service: 2023    Referring Physician: Dr. Adelso Ohara  Rehab Diagnosis: Imp ADLs d/t R total hip arthroplasty. Also a repair of the chronic tear of the gluteus minimus and medius    Restrictions  Restrictions/Precautions  Restrictions/Precautions: Fall Risk, Weight Bearing, Surgical protocol, ROM Restrictions  Required Braces or Orthoses?: Yes (Right Hip Abd Brace)   Lower Extremity Weight Bearing Restrictions  Right Lower Extremity Weight Bearing: Weight Bearing As Tolerated  Position Activity Restriction  Other position/activity restrictions: Avoid Figure 4 and active hip ABD ROM exercises    Patient's date of birth confirmed: Yes    SAFETY:  Safety Devices  Safety Devices in place: Yes  Type of devices: All fall risk precautions in place    SUBJECTIVE: \"That shower is priceless. \"     Pain  Pain at start of treatment: Yes: 2/10    Pain at end of treatment: No    Location: Right Hip   Description: \"Sore\"  Nursing notified: Declined  Intervention: None    COGNITION:  Cognition  Overall Cognitive Status: WFL      Pt's current cognitive status is:  Comprehension: Independent  Expression: Independent  Social Interaction: Independent  Problem Solving: Independent  Memory: Independent    OBJECTIVE: ADL shower session completed as follows. Grooming/Oral Hygiene  Assistance Level: Modified independent  Skilled Clinical Factors: Pt stood at sink with good safety to complete hair care. Pt reports that he already completed oral care today. Pt washed face while seated in shower.   Upper Extremity Bathing  Assistance Level: Modified independent  Skilled Clinical Factors: use of detachable shower head  Lower Extremity Bathing  Equipment Provided: Long-handled sponge  Assistance Level: Supervision  Skilled Clinical Factors: Pt trainin minutes  Therapeutic activities: 10 minutes     Electronically signed by:    GARETT Parry,   2023, 12:55 PM

## 2023-07-22 NOTE — PROGRESS NOTES
Physical Therapy Rehab Treatment Note  [facility]  [facility]       NAME: Darian Gay  : 1945 (75 y.o.)  MRN: [id number]  CODE STATUS: Full Code    Date of Service: 2023     Restrictions:  Restrictions/Precautions: Fall Risk, Weight Bearing  Lower Extremity Weight Bearing Restrictions  Right Lower Extremity Weight Bearing: Weight Bearing As Tolerated  Position Activity Restriction  Other position/activity restrictions: R hip abd brace. Avoid figure 4 R hip and active hip abd ROM exercises. SUBJECTIVE:   Subjective: Patient has no new reports. Agreeable to therapy. Pain  Pain: /10 right hip incision, ok for treatment, no change post session    OBJECTIVE:         Bed Mobility  Overall Assistance Level: Supervision;Modified Independent  Roll Left  Assistance Level: Supervision;Modified independent  Roll Right  Skilled Clinical Factors: Not tested due to recent surgery  Sit to Supine  Assistance Level: Supervision  Skilled Clinical Factors: performed on rehab mat, no vc required, supervision for safety, incrased time and effort  Supine to Sit  Assistance Level: Stand by assist  Skilled Clinical Factors: verbal cues for technique cont  Scooting  Assistance Level: Supervision  Skilled Clinical Factors: incrased time and effort    Transfers  Surface: To chair with arms; To mat;From mat;From chair with arms  Additional Factors: Verbal cues; Hand placement cues; Increased time to complete; Mat raised  Device: Walker  Sit to [employer] Level: Supervision  Skilled Clinical Factors: patient demonstrated improved carryover  Bed To/From Chair  Technique: Stand step  Assistance Level: Stand by assist;Contact guard assist  Skilled Clinical Factors: decrased vc required this session  Car Transfer  Assistance Level: Stand by assist  Skilled Clinical Factors: vc for technque, to keep ad along until reach back to sit    Ambulation  Surface: Level surface  Device: Rolling walker  Distance: 300 x 1  Activity: Within Unit  Activity Comments: bue bracing, vc for increased distance within ad, lle supinated > foot flat  Additional Factors: Verbal cues  Assistance Level: Supervision  Gait Deviations: Slow marlon;Decreased trunk rotation;Decreased step length bilateral  Skilled Clinical Factors: steady, no lob    Stairs  Stair Height: 6''  Device: Bilateral handrails  Number of Stairs: 8  Additional Factors: Verbal cues; Non-reciprocal going up;Non-reciprocal going down  Assistance Level: Stand by assist  Skilled Clinical Factors: vc for sequencing, fair follow throught        PT Exercises  Exercise Treatment: seated :laq 2# on rle, 4# on lle 10x3, seated march lle 4# x10x3, 0# rle x10x3 -  supine saq 4# lle, 0# rle x10x3  Standing Open/Closed Kinetic Chain Exercises: heel raises and march x30 ea      Activity Tolerance  Activity Tolerance: Patient tolerated treatment well     ASSESSMENT/PROGRESS TOWARDS GOALS:   Assessment  Assessment: Session focused on gait and stair training for greater consistency and carryover. Patient also worked toward ble strength with seated and standing therex within precautions. Patient would benefit from continued therapy for return to plof. Activity Tolerance: Patient tolerated treatment well  Discharge Recommendations: Continue to assess pending progress; Patient would benefit from continued therapy after discharge    Goals:  Long Term Goals  Long Term Goal 1: Pt to complete bed mobility indep and efficiently  Long Term Goal 2: Pt to complete bed, sit to stand and car transfers  indep  Long Term Goal 3: Pt to ambulate 50-150ft with appropriate device and indep  Long Term Goal 4: Pt to complete 3 steps with HR and SBA  Long Term Goal 5: Pt to complete HEP with indep    PLAN OF CARE/Safety:   Safety Devices  Type of Devices:  All fall risk precautions in place    Therapy Time:   Individual   Time In 0900   Time Out 1000   Minutes 60      Minutes:  Transfer/Bed mobility training: 15  Gait trainin  Neuro re education:0  Therapeutic ex:20    Elvina Rubinstein, PTA, 23 at 9:46 AM

## 2023-07-22 NOTE — PROGRESS NOTES
Physical Therapy Rehab Treatment Note  Facility/Department: [facility]  Room: [place] [address]       NAME: Owen Grayson  : 1945 (77 y.o.)  MRN: [id number]  CODE STATUS: Full Code    Date of Service: 2023     Restrictions:  Restrictions/Precautions: Fall Risk, Weight Bearing  Lower Extremity Weight Bearing Restrictions  Right Lower Extremity Weight Bearing: Weight Bearing As Tolerated  Position Activity Restriction  Other position/activity restrictions: R hip abd brace. Avoid figure 4 R hip and active hip abd ROM exercises. SUBJECTIVE:   Subjective: Patient has no new reports. Agreeable to therapy. Pain 2/10 pre and post session at [facility], ok for therapy    OBJECTIVE:     Bed mobility, rehab mat - sit to supine; sba/supervision  Supine to sit -     Transfers  Surface: To chair with arms; To mat;From mat;From chair with arms  Additional Factors: Verbal cues; Hand placement cues; Increased time to complete; Mat raised  Device: Walker  Sit to [employer] Level: Supervision  Skilled Clinical Factors: patient demonstrated improved carryover  Bed To/From Chair  Technique: Stand step  Assistance Level: Stand by assist;Contact guard assist  Skilled Clinical Factors: decrased vc required this session  Ambulation  Surface: Level surface  Device: Rolling walker  Distance: 350 with several turns  Activity: Within Unit  Activity Comments: decreased bue bracing, improved posture, cont with lle supination>foot flat, steady -no lob  Additional Factors: Verbal cues  Assistance Level: Supervision  Gait Deviations: Decreased trunk rotation;Decreased step length bilateral  Skilled Clinical Factors: steady, no lob    Stairs  Stair Height: 6''  Device: Bilateral handrails  Number of Stairs: 8  Additional Factors: Verbal cues; Non-reciprocal going up;Non-reciprocal going down  Assistance Level: Stand by assist  Skilled Clinical Factors: cont vc needed initally for sequencing, good carryover after        PT

## 2023-07-22 NOTE — PROGRESS NOTES
I Agree with JEANINE Longo's note and assessment. Electronically signed by Chuck Gifford.  Emmanuelle Porter on 7/22/2023 at 6:44 AM

## 2023-07-23 PROCEDURE — 97116 GAIT TRAINING THERAPY: CPT

## 2023-07-23 PROCEDURE — 99232 SBSQ HOSP IP/OBS MODERATE 35: CPT | Performed by: PHYSICAL MEDICINE & REHABILITATION

## 2023-07-23 PROCEDURE — 6370000000 HC RX 637 (ALT 250 FOR IP): Performed by: PHYSICAL MEDICINE & REHABILITATION

## 2023-07-23 PROCEDURE — 1180000000 HC REHAB R&B

## 2023-07-23 PROCEDURE — 97535 SELF CARE MNGMENT TRAINING: CPT

## 2023-07-23 PROCEDURE — 6370000000 HC RX 637 (ALT 250 FOR IP): Performed by: NURSE PRACTITIONER

## 2023-07-23 RX ADMIN — MENTHOL: 0 POWDER TOPICAL at 20:26

## 2023-07-23 RX ADMIN — Medication 2000 UNITS: at 17:06

## 2023-07-23 RX ADMIN — ASPIRIN 81 MG: 81 TABLET, COATED ORAL at 09:42

## 2023-07-23 RX ADMIN — ACETAMINOPHEN 650 MG: 325 TABLET ORAL at 04:15

## 2023-07-23 RX ADMIN — LOSARTAN POTASSIUM 25 MG: 25 TABLET, FILM COATED ORAL at 09:42

## 2023-07-23 RX ADMIN — SENNOSIDES AND DOCUSATE SODIUM 1 TABLET: 50; 8.6 TABLET ORAL at 20:27

## 2023-07-23 RX ADMIN — FINASTERIDE 5 MG: 5 TABLET, FILM COATED ORAL at 09:42

## 2023-07-23 RX ADMIN — Medication 100 MG: at 09:42

## 2023-07-23 RX ADMIN — ACETAMINOPHEN 650 MG: 325 TABLET ORAL at 17:06

## 2023-07-23 RX ADMIN — SENNOSIDES AND DOCUSATE SODIUM 1 TABLET: 50; 8.6 TABLET ORAL at 09:42

## 2023-07-23 RX ADMIN — TAMSULOSIN HYDROCHLORIDE 0.4 MG: 0.4 CAPSULE ORAL at 17:06

## 2023-07-23 RX ADMIN — ACETAMINOPHEN 650 MG: 325 TABLET ORAL at 12:18

## 2023-07-23 RX ADMIN — MENTHOL: 0 POWDER TOPICAL at 09:43

## 2023-07-23 RX ADMIN — ASPIRIN 81 MG: 81 TABLET, COATED ORAL at 20:24

## 2023-07-23 RX ADMIN — ATORVASTATIN CALCIUM 20 MG: 20 TABLET, FILM COATED ORAL at 20:25

## 2023-07-23 RX ADMIN — METOPROLOL SUCCINATE 25 MG: 25 TABLET, EXTENDED RELEASE ORAL at 20:26

## 2023-07-23 ASSESSMENT — PAIN DESCRIPTION - DESCRIPTORS: DESCRIPTORS: ACHING;DISCOMFORT

## 2023-07-23 ASSESSMENT — PAIN DESCRIPTION - LOCATION
LOCATION: HIP
LOCATION: HIP

## 2023-07-23 ASSESSMENT — PAIN SCALES - GENERAL
PAINLEVEL_OUTOF10: 3
PAINLEVEL_OUTOF10: 0
PAINLEVEL_OUTOF10: 2

## 2023-07-23 ASSESSMENT — PAIN DESCRIPTION - ORIENTATION: ORIENTATION: RIGHT

## 2023-07-23 NOTE — PROGRESS NOTES
Patient alert and oriented pleasant and cooperative up to bathroom with walker safety reinforced right hip brace maintained.

## 2023-07-23 NOTE — PROGRESS NOTES
Physical Therapy Rehab Treatment Note  Facility/Department: [facility] [place] [facility]  [facility]       NAME: Gladys Gibson  : 1945 (66 y.o.)  MRN: [id number]  CODE STATUS: Full Code    Date of Service: 2023       Restrictions:  Restrictions/Precautions: Fall Risk, Weight Bearing, Surgical protocol, ROM Restrictions  Lower Extremity Weight Bearing Restrictions  Right Lower Extremity Weight Bearing: Weight Bearing As Tolerated  Position Activity Restriction  Other position/activity restrictions: Avoid Figure 4 and active hip ABD ROM exercises       SUBJECTIVE:   Subjective: Last night I was finally able to lay on my left side and stay there. I was able to lift my right leg up and keep it there last night. Pain  Pain: 2/10 right hip incision, ok for treatment pre and post treatment      OBJECTIVE:             Bed Mobility  Additional Factors: Head of bed flat; Without handrails  Roll Left  Assistance Level: Modified independent  Roll Right  Skilled Clinical Factors: Not tested due to recent surgery  Supine to Sit  Assistance Level: Modified independent  Scooting  Assistance Level: Modified independent    Transfers  Surface: Wheelchair; To chair with arms;From chair with arms  Device: Walker  Sit to Stand  Assistance Level: Modified independent  Stand to Sit  Assistance Level: Modified independent  Bed To/From Chair  Assistance Level: Modified independent    Ambulation  Surface: Level surface  Device: Rolling walker  Distance: 200' x 2 with turns  Activity: Within Unit  Activity Comments: Pt leaning heavily on walker. Improved posture once educated. Additional Factors: Verbal cues  Assistance Level: Supervision;Modified independent  Skilled Clinical Factors: Steady reciporcal gait.   Able to maintain upright posture, but needed reminders with second gait to not revert to leaning on walker    Stairs  Device: One handrail  Number of Stairs: 12  Additional Factors: Non-reciprocal going up;Non-reciprocal going down  Assistance Level: Supervision;Modified independent  Skilled Clinical Factors: Good technique              PT Exercises  A/AROM Exercises: Seated: hip flexion, LAQ x 20          Activity Tolerance  Activity Tolerance: Patient tolerated treatment well             ASSESSMENT/PROGRESS TOWARDS GOALS:   Assessment  Assessment: Pt has met or almost met all goals. Pt much more clear headed now and would recommend removal of TRINA SYS and testing independence in room tomorrow. Activity Tolerance: Patient tolerated treatment well  Discharge Recommendations: Patient would benefit from continued therapy after discharge; Outpatient PT; Home independently    Goals:  Long Term Goals  Long Term Goal 1: Pt to complete bed mobility indep and efficiently  Long Term Goal 2: Pt to complete bed, sit to stand and car transfers  indep  Long Term Goal 3: Pt to ambulate 50-150ft with appropriate device and indep  Long Term Goal 4: Pt to complete 3 steps with HR and SBA  Long Term Goal 5: Pt to complete HEP with indep    PLAN OF CARE/Safety:   Safety Devices  Type of Devices: Left in chair;Chair alarm in place;Call light within reach      Therapy Time:   Individual   Time In 0844   Time Out 0914   Minutes 30      Minutes:30  Transfer/Bed mobility trainin  Gait trainin  Neuro re education: 0  Therapeutic ex: [place], PTA, 23 at 9:16 AM

## 2023-07-24 PROCEDURE — 97535 SELF CARE MNGMENT TRAINING: CPT

## 2023-07-24 PROCEDURE — 97129 THER IVNTJ 1ST 15 MIN: CPT

## 2023-07-24 PROCEDURE — 1180000000 HC REHAB R&B

## 2023-07-24 PROCEDURE — 6370000000 HC RX 637 (ALT 250 FOR IP): Performed by: NURSE PRACTITIONER

## 2023-07-24 PROCEDURE — 97130 THER IVNTJ EA ADDL 15 MIN: CPT

## 2023-07-24 PROCEDURE — 97110 THERAPEUTIC EXERCISES: CPT

## 2023-07-24 PROCEDURE — 99232 SBSQ HOSP IP/OBS MODERATE 35: CPT | Performed by: INTERNAL MEDICINE

## 2023-07-24 PROCEDURE — 97116 GAIT TRAINING THERAPY: CPT

## 2023-07-24 PROCEDURE — 6370000000 HC RX 637 (ALT 250 FOR IP): Performed by: PHYSICAL MEDICINE & REHABILITATION

## 2023-07-24 PROCEDURE — 99232 SBSQ HOSP IP/OBS MODERATE 35: CPT | Performed by: PHYSICAL MEDICINE & REHABILITATION

## 2023-07-24 RX ORDER — DIAPER,BRIEF,INFANT-TODD,DISP
EACH MISCELLANEOUS 2 TIMES DAILY
Status: DISCONTINUED | OUTPATIENT
Start: 2023-07-24 | End: 2023-07-26 | Stop reason: HOSPADM

## 2023-07-24 RX ADMIN — TAMSULOSIN HYDROCHLORIDE 0.4 MG: 0.4 CAPSULE ORAL at 17:03

## 2023-07-24 RX ADMIN — ATORVASTATIN CALCIUM 20 MG: 20 TABLET, FILM COATED ORAL at 20:21

## 2023-07-24 RX ADMIN — MENTHOL: 0 POWDER TOPICAL at 08:45

## 2023-07-24 RX ADMIN — Medication 2000 UNITS: at 17:03

## 2023-07-24 RX ADMIN — ACETAMINOPHEN 650 MG: 325 TABLET ORAL at 17:03

## 2023-07-24 RX ADMIN — ACETAMINOPHEN 650 MG: 325 TABLET ORAL at 06:23

## 2023-07-24 RX ADMIN — SENNOSIDES AND DOCUSATE SODIUM 1 TABLET: 50; 8.6 TABLET ORAL at 08:44

## 2023-07-24 RX ADMIN — METOPROLOL SUCCINATE 25 MG: 25 TABLET, EXTENDED RELEASE ORAL at 20:21

## 2023-07-24 RX ADMIN — MENTHOL: 0 POWDER TOPICAL at 20:21

## 2023-07-24 RX ADMIN — ASPIRIN 81 MG: 81 TABLET, COATED ORAL at 20:21

## 2023-07-24 RX ADMIN — Medication 100 MG: at 08:44

## 2023-07-24 RX ADMIN — ASPIRIN 81 MG: 81 TABLET, COATED ORAL at 08:44

## 2023-07-24 RX ADMIN — ACETAMINOPHEN 650 MG: 325 TABLET ORAL at 11:42

## 2023-07-24 RX ADMIN — FINASTERIDE 5 MG: 5 TABLET, FILM COATED ORAL at 08:44

## 2023-07-24 RX ADMIN — LOSARTAN POTASSIUM 25 MG: 25 TABLET, FILM COATED ORAL at 08:45

## 2023-07-24 RX ADMIN — HYDROCORTISONE: 1 CREAM TOPICAL at 11:40

## 2023-07-24 ASSESSMENT — PAIN DESCRIPTION - DESCRIPTORS
DESCRIPTORS: SORE
DESCRIPTORS: ACHING
DESCRIPTORS: ACHING;DISCOMFORT

## 2023-07-24 ASSESSMENT — PAIN SCALES - GENERAL
PAINLEVEL_OUTOF10: 0
PAINLEVEL_OUTOF10: 1
PAINLEVEL_OUTOF10: 1
PAINLEVEL_OUTOF10: 3
PAINLEVEL_OUTOF10: 0
PAINLEVEL_OUTOF10: 0

## 2023-07-24 ASSESSMENT — PAIN DESCRIPTION - ORIENTATION
ORIENTATION: RIGHT

## 2023-07-24 ASSESSMENT — PAIN DESCRIPTION - LOCATION
LOCATION: HIP;BUTTOCKS
LOCATION: HIP
LOCATION: HIP

## 2023-07-24 NOTE — PLAN OF CARE
Problem: Discharge Planning  Goal: Discharge to home or other facility with appropriate resources  Outcome: Progressing     Problem: Safety - Adult  Goal: Free from fall injury  Outcome: Progressing     Problem: Pain  Goal: Verbalizes/displays adequate comfort level or baseline comfort level  Outcome: Progressing     Problem: Confusion  Goal: Confusion, delirium, dementia, or psychosis is improved or at baseline  Description: INTERVENTIONS:  1. Assess for possible contributors to thought disturbance, including medications, impaired vision or hearing, underlying metabolic abnormalities, dehydration, psychiatric diagnoses, and notify attending LIP  2. [place] high risk fall precautions, as indicated  3. Provide frequent short contacts to provide reality reorientation, refocusing and direction  4. Decrease environmental stimuli, including noise as appropriate  5. Monitor and intervene to maintain adequate nutrition, hydration, elimination, sleep and activity  6. If unable to ensure safety without constant attention obtain sitter and review sitter guidelines with assigned personnel  7. Initiate Psychosocial CNS and Spiritual Care consult, as indicated  Outcome: Progressing     Problem: Skin/Tissue Integrity  Goal: Absence of new skin breakdown  Description: 1. Monitor for areas of redness and/or skin breakdown  2. Assess vascular access sites hourly  3. Every 4-6 hours minimum:  Change oxygen saturation probe site  4. Every 4-6 hours:  If on nasal continuous positive airway pressure, respiratory therapy assess nares and determine need for appliance change or resting period.   Outcome: Progressing

## 2023-07-24 NOTE — PROGRESS NOTES
[facility]  Facility/Department: [facility]  Speech Language Pathology   Treatment Note          Ron Duffy  1945  R250/R250-01  [x]   confirmed    Date: 2023      Restrictions/Precautions: Fall Risk, Weight Bearing, Surgical protocol, ROM Restrictions  Lower Extremity Weight Bearing Restrictions  Right Lower Extremity Weight Bearing: Weight Bearing As Tolerated  Position Activity Restriction  Other position/activity restrictions: Avoid Figure 4 and active hip ABD ROM exercises    Weight - Scale: 237 lb 12.8 oz (107.9 kg)     ADULT DIET; Regular    SpO2: 95 % (23 0748)  O2 Flow Rate (L/min): 0 L/min (23 2136)  No active isolations    Rehab Diagnosis: Impaired mobility and ADL's due to Right total hip arthroplasty. 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 Pt completed complex written direction task with 100% acc Ind  Goal 2: To address pt's cognitive deficits and promote recall of personal and medical information, pt will answer questions addressing (remote, recent, delayed) recall with 90% accuracy and min cues. Pt completed a delayed memory task using mneumonic strategy with 5/6 acc min cues. Pt needed standby cues to comprehend the use of a mneumonic and make the phrases.   Goal 3: To decrease pt's cognitive deficits through the use of compensatory strategies, the pt will be educated on 3 different memory

## 2023-07-24 NOTE — PROGRESS NOTES
Patient alert and oriented pleasant and cooperative denies any pain or discomfort at this time. Up to bathroom with walker safety awareness improving right hip brace maintained.

## 2023-07-24 NOTE — PROGRESS NOTES
[address]  INPATIENT REHABILITATION  INDEPENDENT IN ROOM NOTE  Room: R250/R250-01  Admit Date: 2023       Date: 2023  Patient Name: Connie Garcia        MRN: [id number]    : 1945  (66 y.o.)  Gender: male      Diagnosis: Impaired mobility and ADL's due to Right total hip arthroplasty. 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 Pt physical ability to be independent in room/suite:  [x] Yes    []   No   Comment:    2. Pt demonstrates cognitive ability to be independent in room/suite:  [x] Yes    []   No   Comment:    3. Pt demonstrates emotional ability to be independent in room/suite:  [x] Yes    []   No   Comment:    4. Special Considerations/Equipment if needed: [x] NA   Comment:    Recommend independent in room/suite:  [x] Yes    []   No            Signature: Electronically signed by Tamica Huynh LPN on  at 42:66 AM        Occupational Therapy:  1. Pt physical ability to be independent in room/suite:  [x] Yes    []   No   Comment:     2. Pt demonstrates cognitive ability to be independent in room/suite:  [x] Yes    []   No   Comment:    3. Pt demonstrates emotional ability to be independent in room/suite:  [x] Yes    []   No   Comment:    4. Special Considerations/Equipment if needed: [] NA   Comment:    Recommend independent in room/suite:  [x] Yes    []   No       Signature: Electronically signed by GARETT Carter on 23 at 10:46 AM EDT      Physical Therapy:  1. Pt physical ability to be independent in room/suite:  [x] Yes    []   No   Comment:     2. Pt demonstrates cognitive ability to be independent in room/suite:  [x] Yes    []   No   Comment:    3.  Pt demonstrates emotional ability to be independent in room/suite:  [x] Yes    []   No   Comment:    4. Special Considerations/Equipment if needed: [] NA   Comment:  WW for ambulation. WC to removed from room. Recommend independent in room/suite:  [x] Yes    []   No       Electronically signed by Anna Marie Leon PTA on 7/25/2023 at 10:01 AM      Physician: The recommendations and comments have been reviewed.           Physician Signature: ***

## 2023-07-24 NOTE — PROGRESS NOTES
OCCUPATIONAL THERAPY  INPATIENT REHAB TREATMENT NOTE  [facility]      NAME: Soumya Victoria  : 1945 (60 y.o.)  MRN: [id number]  CODE STATUS: Full Code  [facility]    Date of Service: 2023    Referring Physician: Dr. Zen Dallas  Rehab Diagnosis: Imp ADLs d/t R total hip arthroplasty. Also a repair of the chronic tear of the gluteus minimus and medius    Restrictions  Restrictions/Precautions  Restrictions/Precautions: Fall Risk, Weight Bearing, Surgical protocol, ROM Restrictions   Lower Extremity Weight Bearing Restrictions  Right Lower Extremity Weight Bearing: Weight Bearing As Tolerated        Position Activity Restriction  Other position/activity restrictions: Avoid Figure 4 and active hip ABD ROM exercises    Patient's date of birth confirmed: Yes    SAFETY:  Safety Devices  Type of devices: All fall risk precautions in place;Call light within reach; Chair alarm in place; Left in chair    SUBJECTIVE:  Subjective: \"Yes, I would like a full shower today. \"    Pain at start of treatment: No    Pain at end of treatment: No      COGNITION:  Orientation  Overall Orientation Status: Within Functional Limits  Orientation Level: Oriented X4  Cognition  Overall Cognitive Status: WFL  Initiation: Requires cues for some  Sequencing: Requires cues for some  Cognition Comment: verbal cues for reminder to adhere to hip precautions during ADLs        OBJECTIVE:       ADLs:  Grooming/Oral Hygiene  Assistance Level: Set-up  Skilled Clinical Factors: to complete grooming at sink seated in chair  Upper Extremity Bathing  Assistance Level: Modified independent  Skilled Clinical Factors: use of detachable shower head  Lower Extremity Bathing  Equipment Provided: Long-handled sponge  Assistance Level: Supervision  Skilled Clinical Factors: verbal cues for reminder to use long-handled sponge to adhere to hip precautions  Upper Extremity Dressing  Assistance Level:  Independent  Skilled Clinical Factors: to don Patient/Caregiver education & training, Equipment evaluation, education, & procurement, Self-Care / ADL, Home management training, Return to work related activity  Continue per OT POC for planned discharge on 23    Patient goals : Get back home, get back to doing what I do           Therapy Time:   Individual Group Co-Treat   Time In 930       Time Out 1030         Minutes 60                   ADL/IADL trainin minutes     Electronically signed by:    GARETT Ramesh,   2023, 11:12 AM

## 2023-07-24 NOTE — PROGRESS NOTES
Physical Therapy  Facility/Department: [facility] MED SURG O087/D978-30  Physical Therapy Discharge      NAME: Nohemi Hsu    : 1945 (61 y.o.)  MRN: [id number]    Account: [de-identified]  Gender: male      Patient has been discharged from acute care hospital. DC patient from current PT program.      Electronically signed by Shonna Lopez PT on 23 at 2:57 PM EDT

## 2023-07-24 NOTE — PROGRESS NOTES
Aquacel removed to right hip, POD #7. Surgical glue intact. ABD applied to protect area against brace. Cortisone cream ordered for rash on face. Avasys to be discontinued, patient to d/c home in 2 days.  Electronically signed by Ginna Kilpatrick RN on 7/24/23 at 10:44 AM EDT

## 2023-07-24 NOTE — PROGRESS NOTES
Physical Therapy Rehab Treatment Note  Facility/Department: [facility] REHAB  [facility]       NAME: Eldonna Boeck  : 1945 (89 y.o.)  MRN: [id number]  CODE STATUS: Full Code    Date of Service: 2023       Restrictions:  Restrictions/Precautions: Fall Risk, Weight Bearing, Surgical protocol, ROM Restrictions  Lower Extremity Weight Bearing Restrictions  Right Lower Extremity Weight Bearing: Weight Bearing As Tolerated  Position Activity Restriction  Other position/activity restrictions: Avoid Figure 4 and active hip ABD ROM exercises       SUBJECTIVE:        Pain  Pain: Pt reports 4/10 pain pre/post session - declines intervention      OBJECTIVE:     Sit to Supine  Assistance Level: Modified independent  Supine to Sit  Assistance Level: Modified independent  Scooting  Assistance Level: Modified independent    Transfers  Surface: Wheelchair  Additional Factors: Verbal cues; Hand placement cues; Increased time to complete  Device: Walker  Sit to Stand  Assistance Level: Modified independent  Stand to [employer]  Assistance Level: Modified independent  Car Transfer  Assistance Level: Modified independent    Ambulation  Surface: Level surface  Device: Rolling walker  Distance: 300' x 2, multiple short distances throughout session  Activity: Within Unit  Activity Comments: Pt leaning heavily on walker. Improved posture once educated. Additional Factors: Verbal cues  Assistance Level: Modified independent  Gait Deviations: Decreased trunk rotation;Decreased step length bilateral  Skilled Clinical Factors: Steady reciporcal gait. Able to maintain upright posture, but occasional reminders to maintain with increased distance.     Stairs  Stair Height: 6''  Device: One handrail  Number of Stairs: 12  Additional Factors: Non-reciprocal going up;Non-reciprocal going down  Assistance Level: Modified independent  Skilled Clinical Factors: Good technique    PT Exercises  Exercise Treatment: seated AP/LAQ/marches/Hip Add x10 BLE  Functional Mobility Circuit Training: STS x5  Dynamic Standing Balance Exercises: standing marches/heel raises/mini squats/hip ext/HS curls x10 BLE       Activity Tolerance  Activity Tolerance: Patient tolerated treatment well    ASSESSMENT/PROGRESS TOWARDS GOALS:   Assessment  Assessment: Pt with good continuation of independent level for all mobility. No significant safety concerns throughout session. Pt appropriate to be independent in the room at this time. Activity Tolerance: Patient tolerated treatment well    Goals:  Long Term Goals  Long Term Goal 1: Pt to complete bed mobility indep and efficiently  Long Term Goal 2: Pt to complete bed, sit to stand and car transfers  indep  Long Term Goal 3: Pt to ambulate 50-150ft with appropriate device and indep  Long Term Goal 4: Pt to complete 3 steps with HR and SBA  Long Term Goal 5: Pt to complete HEP with indep    PLAN OF CARE/Safety:   Safety Devices  Type of Devices:  All fall risk precautions in place      Therapy Time:   Individual   Time In 1430   Time Out 1530   Minutes 60      Minutes: 60  Transfer/Bed mobility training: 15  Gait trainin  Neuro re education:  Therapeutic ex: [address] PTA, 23 at 3:39 PM Prevent future episodes Continue Lasix. DASH diet. 1500mL fluid restriction.  Follow up with your cardiologist, Dr. Wes Hunter, within 1-2 weeks. Call [phone] for appointment. Improved. Breathing comfortably on NC.  Follow up with your primary care provider within 1-2 weeks. Call for appointment. Continue Metoprolol. No anticoagulation secondary to ICH.  Follow up with your primary care provider and cardiologist within 1-2 weeks. Call for appointment. Continue Metoprolol. Monitor BP. DASH diet.  Follow up with your primary care provider within 1-2 weeks. Call for appointment. Follow up with your cardiologist, Dr. Wes Hunter, within 1-2 weeks. Call [phone] for appointment. Continue divalproex.  Follow up with your primary care provider within 1-2 weeks. Call for appointment. Continue arauz.  Follow up with your primary care provider within 1-2 weeks. Call for appointment.

## 2023-07-24 NOTE — PROGRESS NOTES
Physical Therapy Rehab Treatment Note  Facility/Department: [facility] REHAB  Room: R2/R250-01       NAME: Lena Benton  : 1945 (88 y.o.)  MRN: [id number]  CODE STATUS: Full Code    Date of Service: 2023     Restrictions:  Restrictions/Precautions: Fall Risk, Weight Bearing  Lower Extremity Weight Bearing Restrictions  Right Lower Extremity Weight Bearing: Weight Bearing As Tolerated  Position Activity Restriction  Other position/activity restrictions: R hip abd brace. Avoid figure 4 R hip and active hip abd ROM exercises. SUBJECTIVE:   Subjective: Pt reports \"Everything is good except for my face. \"  Pt reports having a rash from shaving. \"They removed the bandage and it looks good. \"  Pt reports they also released the tension on the brace and it feels better. Pt reports he feels good about going home. Pain  Pain: Pt denies pain pre session. Reports 1/10 pain R hip post.    OBJECTIVE:         Bed mobility  Supine to Sit: Modified independent  Sit to Supine: Modified independent  Bed Mobility Comments: Completed with bed flat without rails. Transfers  Sit to Stand: Modified independent  Stand to Sit: Modified independent  Bed to Chair: Modified independent    Ambulation  Surface: Level tile;Uneven;Carpet  Device: Rolling Walker  Other Apparatus:  (R hip abd brace)  Assistance: Supervision;Modified Independent  Quality of Gait: VCs for posture and approximation to [employer]. VCs to decrease UE support through [employer].  Pt able to correct with fair follow through. Distance: 300ft supervision; multiple short distances in hospital room modified indep. Comments: Ambulated multiple short distances around hosipital room with [employer] managing door, lights, curtain, tray table safely without LOB. Rearranged room for safer mobility.     Stairs  # Steps : 12  Stairs Height: 6\"  Rails: Right ascending (B grasp)  Assistance: Modified independent         PT Exercises  Exercise Treatment: seated LAQ and march x20ea

## 2023-07-25 LAB
ANION GAP SERPL CALCULATED.3IONS-SCNC: 9 MEQ/L (ref 9–15)
BUN SERPL-MCNC: 17 MG/DL (ref 8–23)
CALCIUM SERPL-MCNC: 8.6 MG/DL (ref 8.5–9.9)
CHLORIDE SERPL-SCNC: 105 MEQ/L (ref 95–107)
CO2 SERPL-SCNC: 27 MEQ/L (ref 20–31)
CREAT SERPL-MCNC: 0.83 MG/DL (ref 0.7–1.2)
ERYTHROCYTE [DISTWIDTH] IN BLOOD BY AUTOMATED COUNT: 13.3 % (ref 11.5–14.5)
GLUCOSE SERPL-MCNC: 86 MG/DL (ref 70–99)
HCT VFR BLD AUTO: 36.8 % (ref 42–52)
HGB BLD-MCNC: 12.5 G/DL (ref 14–18)
MCH RBC QN AUTO: 30 PG (ref 27–31.3)
MCHC RBC AUTO-ENTMCNC: 34 % (ref 33–37)
MCV RBC AUTO: 88.3 FL (ref 79–92.2)
PLATELET # BLD AUTO: 229 K/UL (ref 130–400)
POTASSIUM SERPL-SCNC: 4.2 MEQ/L (ref 3.4–4.9)
RBC # BLD AUTO: 4.17 M/UL (ref 4.7–6.1)
SODIUM SERPL-SCNC: 141 MEQ/L (ref 135–144)
WBC # BLD AUTO: 8.1 K/UL (ref 4.8–10.8)

## 2023-07-25 PROCEDURE — 97129 THER IVNTJ 1ST 15 MIN: CPT

## 2023-07-25 PROCEDURE — 97535 SELF CARE MNGMENT TRAINING: CPT

## 2023-07-25 PROCEDURE — 36415 COLL VENOUS BLD VENIPUNCTURE: CPT

## 2023-07-25 PROCEDURE — 97130 THER IVNTJ EA ADDL 15 MIN: CPT

## 2023-07-25 PROCEDURE — 99232 SBSQ HOSP IP/OBS MODERATE 35: CPT | Performed by: PHYSICAL MEDICINE & REHABILITATION

## 2023-07-25 PROCEDURE — 1180000000 HC REHAB R&B

## 2023-07-25 PROCEDURE — 6370000000 HC RX 637 (ALT 250 FOR IP): Performed by: NURSE PRACTITIONER

## 2023-07-25 PROCEDURE — 80048 BASIC METABOLIC PNL TOTAL CA: CPT

## 2023-07-25 PROCEDURE — 2700000000 HC OXYGEN THERAPY PER DAY

## 2023-07-25 PROCEDURE — 97116 GAIT TRAINING THERAPY: CPT

## 2023-07-25 PROCEDURE — 85027 COMPLETE CBC AUTOMATED: CPT

## 2023-07-25 PROCEDURE — 6370000000 HC RX 637 (ALT 250 FOR IP): Performed by: PHYSICAL MEDICINE & REHABILITATION

## 2023-07-25 PROCEDURE — 99232 SBSQ HOSP IP/OBS MODERATE 35: CPT | Performed by: INTERNAL MEDICINE

## 2023-07-25 PROCEDURE — 97110 THERAPEUTIC EXERCISES: CPT

## 2023-07-25 RX ORDER — DIAPER,BRIEF,INFANT-TODD,DISP
EACH MISCELLANEOUS
Qty: 30 G | Refills: 0 | Status: SHIPPED | OUTPATIENT
Start: 2023-07-25 | End: 2023-08-01

## 2023-07-25 RX ORDER — UBIDECARENONE 100 MG
100 CAPSULE ORAL DAILY
Qty: 60 CAPSULE | Refills: 0 | Status: SHIPPED | OUTPATIENT
Start: 2023-07-26

## 2023-07-25 RX ORDER — ASPIRIN 81 MG/1
81 TABLET ORAL 2 TIMES DAILY
Qty: 60 TABLET | Refills: 0 | Status: SHIPPED | OUTPATIENT
Start: 2023-07-18 | End: 2023-08-17

## 2023-07-25 RX ORDER — LIDOCAINE 4 G/G
3 PATCH TOPICAL DAILY PRN
Qty: 15 PATCH | Refills: 0 | Status: SHIPPED | OUTPATIENT
Start: 2023-07-25

## 2023-07-25 RX ORDER — CHOLECALCIFEROL (VITAMIN D3) 50 MCG
2000 TABLET ORAL
Qty: 60 TABLET | Refills: 0 | Status: SHIPPED | OUTPATIENT
Start: 2023-07-25

## 2023-07-25 RX ADMIN — ATORVASTATIN CALCIUM 20 MG: 20 TABLET, FILM COATED ORAL at 21:42

## 2023-07-25 RX ADMIN — MENTHOL: 0 POWDER TOPICAL at 21:38

## 2023-07-25 RX ADMIN — HYDROCORTISONE: 1 CREAM TOPICAL at 08:55

## 2023-07-25 RX ADMIN — ASPIRIN 81 MG: 81 TABLET, COATED ORAL at 08:51

## 2023-07-25 RX ADMIN — TAMSULOSIN HYDROCHLORIDE 0.4 MG: 0.4 CAPSULE ORAL at 17:34

## 2023-07-25 RX ADMIN — ACETAMINOPHEN 650 MG: 325 TABLET ORAL at 06:25

## 2023-07-25 RX ADMIN — MENTHOL: 0 POWDER TOPICAL at 08:54

## 2023-07-25 RX ADMIN — Medication 100 MG: at 08:51

## 2023-07-25 RX ADMIN — METOPROLOL SUCCINATE 25 MG: 25 TABLET, EXTENDED RELEASE ORAL at 21:42

## 2023-07-25 RX ADMIN — OXYCODONE HYDROCHLORIDE 5 MG: 5 CAPSULE ORAL at 15:09

## 2023-07-25 RX ADMIN — FINASTERIDE 5 MG: 5 TABLET, FILM COATED ORAL at 08:51

## 2023-07-25 RX ADMIN — ACETAMINOPHEN 650 MG: 325 TABLET ORAL at 17:34

## 2023-07-25 RX ADMIN — Medication 2000 UNITS: at 17:34

## 2023-07-25 RX ADMIN — ACETAMINOPHEN 650 MG: 325 TABLET ORAL at 12:36

## 2023-07-25 RX ADMIN — ASPIRIN 81 MG: 81 TABLET, COATED ORAL at 21:42

## 2023-07-25 RX ADMIN — LOSARTAN POTASSIUM 25 MG: 25 TABLET, FILM COATED ORAL at 08:51

## 2023-07-25 ASSESSMENT — PAIN SCALES - GENERAL
PAINLEVEL_OUTOF10: 0
PAINLEVEL_OUTOF10: 4
PAINLEVEL_OUTOF10: 0
PAINLEVEL_OUTOF10: 2

## 2023-07-25 ASSESSMENT — PAIN DESCRIPTION - DESCRIPTORS
DESCRIPTORS: ACHING
DESCRIPTORS: ACHING

## 2023-07-25 ASSESSMENT — PAIN DESCRIPTION - LOCATION
LOCATION: HIP
LOCATION: HIP

## 2023-07-25 ASSESSMENT — PAIN DESCRIPTION - ORIENTATION
ORIENTATION: RIGHT
ORIENTATION: RIGHT

## 2023-07-25 NOTE — PROGRESS NOTES
OCCUPATIONAL THERAPY  INPATIENT REHAB TREATMENT NOTE  [facility]      NAME: Rivera Boyd  : 1945 (69 y.o.)  MRN: [id number]  CODE STATUS: Full Code  Room: R250/R250-01    Date of Service: 2023    Referring Physician: Dr. Deepti Hager  Rehab Diagnosis: Imp ADLs d/t R total hip arthroplasty. Also a repair of the chronic tear of the gluteus minimus and medius    Restrictions  Restrictions/Precautions  Restrictions/Precautions: Fall Risk, Weight Bearing  Required Braces or Orthoses?: Yes   Lower Extremity Weight Bearing Restrictions  Right Lower Extremity Weight Bearing: Weight Bearing As Tolerated    Patient's date of birth confirmed: Yes    SAFETY:  Safety Devices  Safety Devices in place: Yes  Type of devices: All fall risk precautions in place    SUBJECTIVE: \"The girl did that yesterday. \" - assess for Independent in the room    Pain at start of treatment: No    Pain at end of treatment: Yes: 3/10    Location: R hip  Description ache  Nursing notified: Declined  Intervention: None    COGNITION:  Orientation  Overall Orientation Status: Within Functional Limits  Cognition  Overall Cognitive Status: WFL    OBJECTIVE:    Grooming/Oral Hygiene  Assistance Level: Modified independent  Upper Extremity Bathing  Assistance Level: Modified independent  Lower Extremity Bathing  Equipment Provided: Long-handled sponge  Assistance Level: Supervision  Skilled Clinical Factors: verbal cues to recall hip precautions  Upper Extremity Dressing  Assistance Level:  Independent  Lower Extremity Dressing  Equipment Provided: Reachers  Assistance Level: Modified independent  Putting On/Taking Off Footwear  Equipment Provided: Reachers;Sock aid;Dressing stick  Assistance Level: Modified independent  Toileting  Assistance Level: Modified independent  Toilet Transfers  Technique: Stand step  Equipment: Standard toilet;Grab bars  Assistance Level: Modified independent  Skilled Clinical Factors: ww  Tub/Shower

## 2023-07-25 NOTE — PROGRESS NOTES
Assessment completed. A&O x4. Denies pain at this time. Removed old drsg from right hip. Noted no drainage. Incision dry and intact with surgical glue. Applied ABD. Hip brace in place. In bed with alarm activated. Call light in reach. Electronically signed by Marley Maher LPN on 6/89/6072 at 4:54 PM      Pt now independent in room.  Electronically signed by Marley Maher LPN on 3/60/0848 at 9:73 PM

## 2023-07-25 NOTE — PROGRESS NOTES
[facility]    Acute  Rehabilitation  MUSIC THERAPY      Date:  7/25/2023        Patient Name: Edis Contreras       MRN: [id number]        YOB: 1945 (74 y.o.)       Gender: male  Diagnosis: Imp ADLs d/t R total hip arthroplasty. Also a repair of the chronic tear of the gluteus minimus and medius  Referring Practitioner: Dr. 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 Assessment/Plan:      [] Patient would like to have music therapy, just not today. [facility]. [] Patient would like to have music therapy another time, however their D/C is before [facility] will be back on unit. *If patient is still on rehab unit, or comes back to [facility]hab unit, [facility]. [x] Patient does not want music therapy. [facility] not [facility] again unless pt specifically requests.        FRANSISCO Malhotra    7/25/2023  Electronically signed by Eloy Avila on 7/25/2023 at 1:24 PM

## 2023-07-25 NOTE — PROGRESS NOTES
Physical Therapy Rehab Treatment Note  Facility/Department: [facility] [place] [facility]AB  [facility]       NAME: Michelle Braxton  : 1945 (30 y.o.)  MRN: [id number]  CODE STATUS: Full Code    Date of Service: 2023     Restrictions:  Restrictions/Precautions: Fall Risk, Weight Bearing  Lower Extremity Weight Bearing Restrictions  Right Lower Extremity Weight Bearing: Weight Bearing As Tolerated  Position Activity Restriction  Other position/activity restrictions: R hip abd brace. Avoid figure 4 R hip and active hip abd ROM exercises. SUBJECTIVE:   Subjective: Pt states he has been going to the bathroom on hs own without any problems. Pain  Pain: 2/10 R hip Pt declines intervention. 4/10 post session. RN notified for meds. CP applied to R hip. Instructed pt on 20 min on/20 min off. OBJECTIVE:         Bed mobility  Rolling to Left: Modified independent  Rolling to Right: Modified independent  Supine to Sit: Modified independent  Sit to Supine: Modified independent    Transfers  Sit to Stand: Modified independent  Stand to Sit: Modified independent  Bed to Chair: Modified independent    Ambulation  Surface: Level tile;Uneven;Carpet  Device: Rolling Walker  Other Apparatus: Right (hip abd brace)  Assistance: Modified Independent  Quality of Gait: Fwd flexed posture  Distance: [id number]; multiple household distances in hospital room     PT Exercises  A/AROM Exercises: supine SAQ 2x10, heel slides x20  Functional Mobility Circuit Training: STS x5  Dynamic Standing Balance Exercises: Picking objects up with reacher standing at [employer] indep. Standing Open/Closed Kinetic Chain Exercises: heel raises, march, ham curl x10 ea         Education Provided: Home Exercise Program;Equipment  Education  Education Given To: Patient  Education Provided: Home Exercise Program;Equipment  Education Provided Comments: Reviewed and issued HEP.   Instructed pt to utilize [employer] for ambulation until progressed off by follow up therapy. ASSESSMENT/PROGRESS TOWARDS GOALS:   Assessment: Pt has met goals. Goals:  Long Term Goals  Long Term Goal 1: Pt to complete bed mobility indep and efficiently-met  Long Term Goal 2: Pt to complete bed, sit to stand and car transfers  indep-met  Long Term Goal 3: Pt to ambulate 50-150ft with appropriate device and indep-met  Long Term Goal 4: Pt to complete 3 steps with HR and SBA-met  Long Term Goal 5: Pt to complete HEP with indep-met    PLAN OF CARE/Safety:   Additional Comments: Pt to D/C home tomorrow.     Therapy Time:   Individual   Time In 1400   Time Out 1500   Minutes 60      Minutes:  Transfer/Bed mobility training:10  Gait trainin  Neuro re education:5  Therapeutic ex:25    Jordan Otto PTA, 23 at 3:02 PM

## 2023-07-25 NOTE — CARE COORDINATION
LSW spoke with pt and confirmed discharge for tomorrow. Pt is independent in the room and confirmed he feels ready to discharge home. No new DME needed. Given freedom of choice, pt would like to go to OP at [facility]. Pt was unsure if he was cleared to drive and would be able to take himself. LSW explained that it is recommended to follow up with the PCP within a week of discharge and that the PCP would determine driving restrictions. Pt verbalized understanding and was made aware of discharge time being anytime after 2PM. LSW called Marleny Zuniga (son) afterwards and he confirmed that between him and his wife, they can transport pt to OP if needed. Marleny Zuniga is aware of recommendation for pt to not drive until he follows up with his PCP. Marleny Zuniga was also made aware of pt being independent in the room and is pleased with pt's overall progress. Marleny Zuniga noted that him and his wife will also be checking in on pt frequently at first. Marleny Zuniga will arrive tomorrow sometime after 2PM to discharge pt home. Pt made aware of family transporting to OP and is pleased.  Electronically signed by GERMAINE Degroot, WADE on 7/25/2023 at 3:07 PM

## 2023-07-25 NOTE — PROGRESS NOTES
Progress Note    Date:7/25/2023       [facility]  Patient Maninder Camarillo     YOB: 1945     Age:78 y.o. Assessment        Hospital Problems             Last Modified POA    * (Principal) Impaired mobility and activities of daily living dt  Right total hip arthroplasty. (Right: Hip) 7/19/2023 Yes    Hypertension 7/19/2023 Yes    Benign prostatic hyperplasia with incomplete bladder emptying 7/19/2023 Yes    Overview Signed 10/14/2017  7:25 PM by Shreya 10 Ambulatory     Updating Deprecated Diagnoses           Cerebrovascular disease 7/19/2023 Yes    Sensorineural hearing loss, bilateral 7/19/2023 Yes    Hyperlipidemia 7/19/2023 Yes    CAD (coronary artery disease) 7/19/2023 Yes    Status post total hip replacement, right 7/19/2023 Yes    Post-op pain 7/19/2023 Yes    Encephalopathy acute 7/19/2023 Yes    Nocturnal hypoxia 7/20/2023 Yes    Sleep apnea 7/20/2023 Yes     Plan:        Impaired mobility and activities of daily living due to right total hip arthroplasty, osteoarthritis   - Dr. Leann Ye hypertension, hyperlipidemia, CAD   - BP within acceptable range, latest /78   - on aspirin, Lipitor, Cozaar, Toprol-XL    BPH    - on Flomax and Proscar    Nocturnal hypoxia, rule out sleep apnea   - Pulmonology following   - pt to ffup after 2 weeks after DC  - for sleep study as outpatient    Hospital medicine managing acute needs. Patient will need to follow up with PCP for chronic disease management. Time spent evaluating and intervening patient, 35 minutes. Greater than 70% of time spent focused exclusively on this patient, reviewing chart, reconciling medications, and answering questions and discussing treatment plan. Subjective   Interval History Status: improved. Patient claims that he is feeling and doing good today. No complaints made. He denies fever, chills, dizziness, shortness of breath, chest pain, and N/V/D.   Review of Systems   12 point review

## 2023-07-25 NOTE — PROGRESS NOTES
[facility]  Facility/Department: [facility]  Speech Language Pathology   Treatment Note          Mehdi Session  1945  R250/R250-01  [x]   confirmed    Date: 2023      Restrictions/Precautions: Fall Risk, Weight Bearing, Surgical protocol, ROM Restrictions  Lower Extremity Weight Bearing Restrictions  Right Lower Extremity Weight Bearing: Weight Bearing As Tolerated  Position Activity Restriction  Other position/activity restrictions: Avoid Figure 4 and active hip ABD ROM exercises    Weight - Scale: 237 lb 12.8 oz (107.9 kg)     ADULT DIET; Regular    SpO2: 95 % (23 0737)  O2 Flow Rate (L/min): 2 L/min (23)  No active isolations    Rehab Diagnosis: Impaired mobility and ADL's due to Right total hip arthroplasty. 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 With repetition out loud strategy, patient completed 4 word immediate recall task with the following accuracy  4 word recall: I with 60% accuracy, with request for repetition 90% accuracy  Category placement: I with 80% accuracy. Goal 3: To decrease pt's cognitive deficits through the use of compensatory strategies, the pt will be educated on 3 different memory strategies and verbalize how he might use them at home in 3 ways with modified I cues. Patient unable to recall strategies he was educated on in past session. ST re-educated the patient on memory strategies. Goal 4:  To increase safety awareness and judgment for safe completion of ADLs secondary to pt's cognitive deficits,  pt will complete high level problem solving tasks related to ADLs (e.g. home and community safety) with 100% accuracy and supervised cues. Patient completed time telling word problems I with 90% accuracy. Treatment/Activity Tolerance:  Patient tolerated treatment well    Plan:  Continue per POC    Pain Assessment:  Patient c/o pain: Pt able to proceed with session. Pain Re-assessment:  Patient c/o pain: Pt able to proceed with session. Patient/Caregiver Education:  Patient educated on session and progression towards goals.     Safety Devices:  Bed alarm in place and Call light within reach        Speech Therapy Level of Assistance Scale    AUDITORY COMPREHENSION  Rating:  Independent-Modified Independent    VERBAL EXPRESSION  Rating:  Independent-Modified Independent    MOTOR SPEECH  Rating: Independent    PROBLEM SOLVING  Rating: Modified Independent-Supervised Assistance    MEMORY  Rating:  Supervised Assistance        Therapy Time  SLP Individual Minutes  Time In: 0900  Time Out: 0930  Minutes: 30            Signature: Electronically signed by FOX Balderrama on 7/25/2023 at 11:34 AM

## 2023-07-25 NOTE — PROGRESS NOTES
Physical Therapy Rehab Treatment Note  Facility/Department: [facility]  [facility]       NAME: Romi Servin  : 1945 (31 y.o.)  MRN: [id number]  CODE STATUS: Full Code    Date of Service: 2023     Restrictions:  Restrictions/Precautions: Fall Risk, Weight Bearing  Lower Extremity Weight Bearing Restrictions  Right Lower Extremity Weight Bearing: Weight Bearing As Tolerated  Position Activity Restriction  Other position/activity restrictions: R hip abd brace. Avoid figure 4 R hip and active hip abd ROM exercises. SUBJECTIVE:   Subjective: Pt states he is ready to go home. Pt reports he would like to go to out patient physical therapy on [address] after discharge. Pain  Pain: 0/10    OBJECTIVE:         Bed mobility  Rolling to Left: Modified independent  Rolling to Right: Modified independent  Supine to Sit: Modified independent  Sit to Supine: Modified independent    Transfers  Sit to Stand: Modified independent  Stand to Sit: Modified independent  Bed to Chair: Modified independent  Car Transfer: Modified independent (Cushion added to car seat simulator to elevate height. Pt reports he will be picked up in an Escape.)    Ambulation  Surface: Level tile;Uneven;Carpet  Device: Rolling Walker  Other Apparatus: Right (hip abd brace)  Assistance: Modified Independent  Quality of Gait: Fwd flexed posture  Distance: 300ft; multiple household distances in hospital room    Stairs/Curb  Stairs?: Yes  Stairs  # Steps : 12  Stairs Height: 6\"  Rails: Right ascending (B grasp)  Assistance: Modified independent         PT Exercises  Exercise Treatment: seated AP/LAQ/marches/Hip Add with pillow/hip abd isomentric with belt x20 BLE; supine AP/QS/GS/heel slides x20 ea  Reviewed seated and supine and issued for HEP.          Education Provided: Home Exercise Program;Equipment  Education  Education Given To: Patient  Education Provided: Home Exercise Program;Equipment  Education Provided Comments: Reviewed and issued HEP. Instructed pt to utilize [facility] for ambulation until progressed off by follow up therapy. ASSESSMENT/PROGRESS TOWARDS GOALS:   Assessment: Pt has met goals. Goals:  Long Term Goals  Long Term Goal 1: Pt to complete bed mobility indep and efficiently-met  Long Term Goal 2: Pt to complete bed, sit to stand and car transfers  indep-met  Long Term Goal 3: Pt to ambulate 50-150ft with appropriate device and indep-met  Long Term Goal 4: Pt to complete 3 steps with HR and SBA-met  Long Term Goal 5: Pt to complete HEP with indep-met    PLAN OF CARE/Safety: Cont pre POC. Additional Comments: Pt to D/C home tomorrow.   Therapy Time:   Individual   Time In 1100   Time Out 1200   Minutes 60      Minutes:  Transfer/Bed mobility training:10  Gait trainin  Neuro re education:0  Therapeutic ex:25    Maria Teresa Dean PTA, 23 at 12:08 PM

## 2023-07-25 NOTE — PROGRESS NOTES
[address]  INPATIENT REHABILITATION  INDEPENDENT IN ROOM NOTE  Room: R250/R250-01  Admit Date: 2023       Date: 2023  Patient Name: Connie Garcia        MRN: [id number]    : 1945  (66 y.o.)  Gender: male      Diagnosis: Impaired mobility and ADL's due to Right total hip arthroplasty. 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 Pt physical ability to be independent in room/suite:  [x] Yes    []   No   Comment:    2. Pt demonstrates cognitive ability to be independent in room/suite:  [x] Yes    []   No   Comment:    3. Pt demonstrates emotional ability to be independent in room/suite:  [x] Yes    []   No   Comment:    4. Special Considerations/Equipment if needed: [x] NA   Comment:    Recommend independent in room/suite:  [x] Yes    []   No            Signature: Electronically signed by GERMAINE Adame LSW on 2023 at 1:55 PM        Occupational Therapy:  1. Pt physical ability to be independent in room/suite:  [x] Yes    []   No   Comment:     2. Pt demonstrates cognitive ability to be independent in room/suite:  [x] Yes    []   No   Comment:    3. Pt demonstrates emotional ability to be independent in room/suite:  [x] Yes    []   No   Comment:    4. Special Considerations/Equipment if needed: [] NA   Comment:    Recommend independent in room/suite:  [x] Yes    []   No       Signature: Electronically signed by GARETT Carter on 23 at 10:46 AM EDT      Physical Therapy:  1. Pt physical ability to be independent in room/suite:  [x] Yes    []   No   Comment:     2. Pt demonstrates cognitive ability to be independent in room/suite:  [x] Yes    []   No   Comment:    3.  Pt demonstrates emotional ability to be

## 2023-07-26 VITALS
OXYGEN SATURATION: 96 % | WEIGHT: 227.1 LBS | DIASTOLIC BLOOD PRESSURE: 80 MMHG | RESPIRATION RATE: 18 BRPM | BODY MASS INDEX: 33.64 KG/M2 | HEIGHT: 69 IN | HEART RATE: 77 BPM | SYSTOLIC BLOOD PRESSURE: 125 MMHG | TEMPERATURE: 98.2 F

## 2023-07-26 PROCEDURE — 6370000000 HC RX 637 (ALT 250 FOR IP): Performed by: NURSE PRACTITIONER

## 2023-07-26 PROCEDURE — 94762 N-INVAS EAR/PLS OXIMTRY CONT: CPT

## 2023-07-26 PROCEDURE — 97530 THERAPEUTIC ACTIVITIES: CPT

## 2023-07-26 PROCEDURE — 99239 HOSP IP/OBS DSCHRG MGMT >30: CPT | Performed by: PHYSICAL MEDICINE & REHABILITATION

## 2023-07-26 PROCEDURE — 6370000000 HC RX 637 (ALT 250 FOR IP): Performed by: PHYSICAL MEDICINE & REHABILITATION

## 2023-07-26 PROCEDURE — 2700000000 HC OXYGEN THERAPY PER DAY

## 2023-07-26 PROCEDURE — 6360000002 HC RX W HCPCS: Performed by: PHYSICAL MEDICINE & REHABILITATION

## 2023-07-26 PROCEDURE — 97116 GAIT TRAINING THERAPY: CPT

## 2023-07-26 PROCEDURE — 97110 THERAPEUTIC EXERCISES: CPT

## 2023-07-26 PROCEDURE — 99232 SBSQ HOSP IP/OBS MODERATE 35: CPT | Performed by: INTERNAL MEDICINE

## 2023-07-26 RX ORDER — OXYCODONE HYDROCHLORIDE 5 MG/1
5 CAPSULE ORAL EVERY 4 HOURS PRN
Qty: 30 CAPSULE | Refills: 0 | Status: SHIPPED | OUTPATIENT
Start: 2023-07-26 | End: 2023-08-02

## 2023-07-26 RX ADMIN — Medication 100 MG: at 09:46

## 2023-07-26 RX ADMIN — SENNOSIDES AND DOCUSATE SODIUM 1 TABLET: 50; 8.6 TABLET ORAL at 09:46

## 2023-07-26 RX ADMIN — ACETAMINOPHEN 650 MG: 325 TABLET ORAL at 06:51

## 2023-07-26 RX ADMIN — CYANOCOBALAMIN 1000 MCG: 1000 INJECTION, SOLUTION INTRAMUSCULAR; SUBCUTANEOUS at 09:46

## 2023-07-26 RX ADMIN — MENTHOL: 0 POWDER TOPICAL at 09:57

## 2023-07-26 RX ADMIN — LOSARTAN POTASSIUM 25 MG: 25 TABLET, FILM COATED ORAL at 09:46

## 2023-07-26 RX ADMIN — HYDROCORTISONE: 1 CREAM TOPICAL at 09:19

## 2023-07-26 RX ADMIN — ACETAMINOPHEN 650 MG: 325 TABLET ORAL at 12:17

## 2023-07-26 RX ADMIN — FINASTERIDE 5 MG: 5 TABLET, FILM COATED ORAL at 09:46

## 2023-07-26 RX ADMIN — ASPIRIN 81 MG: 81 TABLET, COATED ORAL at 09:46

## 2023-07-26 ASSESSMENT — PAIN SCALES - GENERAL
PAINLEVEL_OUTOF10: 0
PAINLEVEL_OUTOF10: 0

## 2023-07-26 NOTE — PLAN OF CARE
Problem: Discharge Planning  Goal: Discharge to home or other facility with appropriate resources  Outcome: Progressing  Flowsheets (Taken 7/25/2023 2120)  Discharge to home or other facility with appropriate resources: Refer to discharge planning if patient needs post-hospital services based on physician order or complex needs related to functional status, cognitive ability or social support system

## 2023-07-26 NOTE — PROGRESS NOTES
Physical Therapy Rehab Treatment Note  Facility/Department: [facility]  [facility]       NAME: Eusebio Womack  : 1945 (72 y.o.)  MRN: [id number]  CODE STATUS: Full Code    Date of Service: 2023     Restrictions:  Restrictions/Precautions: Fall Risk, Weight Bearing  Lower Extremity Weight Bearing Restrictions  Right Lower Extremity Weight Bearing: Weight Bearing As Tolerated  Position Activity Restriction  Other position/activity restrictions: R hip abd brace. Avoid figure 4 R hip and active hip abd ROM exercises. SUBJECTIVE: Pt states he is ready to go home. Pain   Pt denies pain. OBJECTIVE:            Transfers  Sit to Stand: Modified independent  Stand to Sit: Modified independent  Bed to Chair: Modified independent    Ambulation  Surface: Level tile;Uneven;Carpet; Ramp  Device: Rolling Walker  Other Apparatus: Right (hip abd brace)  Assistance: Modified Independent  Distance: 300ft           PT Exercises  Exercise Treatment: seated AP/LAQ/marches/Hip Add with pillow/hip abd isomentric with belt x20 BLE  Reviewed previously issued HEP for quality. Pt completed indep. ASSESSMENT/PROGRESS TOWARDS GOALS: Pt has met goals. Goals:  Long Term Goals  Long Term Goal 1: Pt to complete bed mobility indep and efficiently-met  Long Term Goal 2: Pt to complete bed, sit to stand and car transfers  indep-met  Long Term Goal 3: Pt to ambulate 50-150ft with appropriate device and indep-met  Long Term Goal 4: Pt to complete 3 steps with HR and SBA-met  Long Term Goal 5: Pt to complete HEP with indep-met    PLAN OF CARE/Safety: Pt scheduled to D/C home today.        Therapy Time:   Individual   Time In 1030   Time Out 1100   Minutes 30      Minutes:  Transfer/Bed mobility trainin  Gait training:10  Neuro re education:0  Therapeutic ex:15    Jackelyn Care, PTA, 23 at 10:49 AM

## 2023-07-26 NOTE — PROGRESS NOTES
Nocturnal pulse ox completed. Pt on RA. Total time of pt SpO2 <89% was 2 minutes. Pt does NOT qualify.

## 2023-07-26 NOTE — CARE COORDINATION
Pt set to discharge home today, pt confirms he feels ready. No new DME needed. Pt to receive OP therapy at [facility], orders were obtained. Family to transport home.  Electronically signed by GERMAINE Hankins, ELIZABETHW on 7/26/2023 at 2:51 PM

## 2023-07-26 NOTE — PROGRESS NOTES
Pt assessment completed. Pt denies pain. Pt is independent in room. D/C today. Pt verbalized excitement for \"going home\". Will be doing outpt PT/OT. Brace to R Hip on and in place. ABD over Glued incision RE: Brace. Pt was on nocturnal pulse ox till now- results not documented yet.

## 2023-07-26 NOTE — PROGRESS NOTES
66year old male with dx right total hip arthroplasty. Incision clean dry well approximated. Brace in place. Pt. Aware on how to place the brace. Pt. Educated on his medications. Pt discharge instructions were reviewed with pt. And all questions answered. Pt. Preferred to receive his prescriptions from [facility] [address]. This was confirmed with the [facility]. Family to transport pt home.

## 2023-07-26 NOTE — PROGRESS NOTES
OCCUPATIONAL THERAPY  INPATIENT REHAB TREATMENT NOTE  [facility]      NAME: Nohemi Hsu  : 1945 (83 y.o.)  MRN: [id number]  CODE STATUS: Full Code  [facility]    Date of Service: 2023    Referring Physician: Dr. Ann Cortez  Rehab Diagnosis: Imp ADLs d/t R total hip arthroplasty. Also a repair of the chronic tear of the gluteus minimus and medius    Restrictions  Restrictions/Precautions  Restrictions/Precautions: Fall Risk, Weight Bearing  Required Braces or Orthoses?: Yes   Lower Extremity Weight Bearing Restrictions  Right Lower Extremity Weight Bearing: Weight Bearing As Tolerated    Patient's date of birth confirmed: Yes    SAFETY:  Safety Devices  Safety Devices in place: Yes  Type of devices: All fall risk precautions in place    SUBJECTIVE: \"I can't wait to get out of here and walk down my driveway to check my mail. \"    Pain at start of treatment: No    Pain at end of treatment: No    COGNITION:  Orientation  Overall Orientation Status: Within Functional Limits  Cognition  Overall Cognitive Status: WFL    OBJECTIVE:    UE Strengthening HEP:   Patient engaged in B UE ROM/strengthening to increase I with ADL's and transfers. Patient issued written HEP focusing on all UE joints including scapular protraction/retraction, shoulder flexion/extension/rotation/horizontal abduction, elbow flexion/extension, supination/pronation, and wrist/digit flexion/extension. .  Patient able to utilize 1 # hand weight with R UE and 2 # hand weight with L UE 1 X 10 repetitions in various planes of motion. Patient required 0 verbal cues for proper technique. Patient required 0 verbal cues to keep correct count. Rest breaks as needed. ASSESSMENT: Patient motivated and worked at a quiet and steady pace.     Activity Tolerance: Patient tolerated treatment well      PLAN OF CARE:  Strengthening, Balance training, Functional mobility training, Endurance training, Gait training, Cognitive

## 2023-07-26 NOTE — PLAN OF CARE
Problem: Discharge Planning  Goal: Discharge to home or other facility with appropriate resources  7/26/2023 0442 by Gaetano Marino RN  Outcome: Progressing  7/26/2023 0427 by Gaetano Marino RN  Outcome: Progressing  Flowsheets (Taken 7/25/2023 2120)  Discharge to home or other facility with appropriate resources: Refer to discharge planning if patient needs post-hospital services based on physician order or complex needs related to functional status, cognitive ability or social support system     Problem: Discharge Planning  Goal: Discharge to home or other facility with appropriate resources  7/26/2023 0442 by Gaetano Marino RN  Outcome: Progressing  7/26/2023 0427 by Gaetano Marino RN  Outcome: Progressing  Flowsheets (Taken 7/25/2023 2120)  Discharge to home or other facility with appropriate resources: Refer to discharge planning if patient needs post-hospital services based on physician order or complex needs related to functional status, cognitive ability or social support system

## 2023-07-26 NOTE — PLAN OF CARE
Problem: Discharge Planning  Goal: Discharge to home or other facility with appropriate resources  7/26/2023 1222 by Ronnie Soriano RN  Outcome: Adequate for Discharge  7/26/2023 5979 by Rochester Meckel, RN  Outcome: Progressing  7/26/2023 0427 by Rochester Meckel, RN  Outcome: Progressing  Flowsheets (Taken 7/25/2023 2120)  Discharge to home or other facility with appropriate resources: Refer to discharge planning if patient needs post-hospital services based on physician order or complex needs related to functional status, cognitive ability or social support system     Problem: Safety - Adult  Goal: Free from fall injury  7/26/2023 1222 by Ronnie Soriano RN  Outcome: Adequate for Discharge  7/26/2023 4407 by Rochester Meckel, RN  Outcome: Progressing  7/26/2023 0427 by Rochester Meckel, RN  Outcome: Progressing     Problem: Pain  Goal: Verbalizes/displays adequate comfort level or baseline comfort level  7/26/2023 1222 by Ronnie Soriano RN  Outcome: Adequate for Discharge  7/26/2023 7437 by Rochester Meckel, RN  Outcome: Progressing  7/26/2023 0427 by Rochester Meckel, RN  Outcome: Progressing     Problem: Confusion  Goal: Confusion, delirium, dementia, or psychosis is improved or at baseline  Description: INTERVENTIONS:  1. Assess for possible contributors to thought disturbance, including medications, impaired vision or hearing, underlying metabolic abnormalities, dehydration, psychiatric diagnoses, and notify attending LIP  2. [place] high risk fall precautions, as indicated  3. Provide frequent short contacts to provide reality reorientation, refocusing and direction  4. Decrease environmental stimuli, including noise as appropriate  5. Monitor and intervene to maintain adequate nutrition, hydration, elimination, sleep and activity  6. If unable to ensure safety without constant attention obtain sitter and review sitter guidelines with assigned personnel  7.  Initiate Psychosocial CNS and Spiritual Care consult,

## 2023-07-27 NOTE — PROGRESS NOTES
Physical Therapy  Facility/Department: [facility]  Rehabilitation Discharge note    NAME: Eusebio Womack  : 1945  MRN: [id number]    Date of discharge: 23        Past Medical History:   Diagnosis Date    BPH (benign prostatic hypertrophy)     CAD (coronary artery disease)     Cerebrovascular disease     Chronic otitis externa 2016    Chronic suppurative otitis media 2015    Ear injury 2006    [address]    ED (erectile dysfunction) 2014    Hearing loss     History of nephrolithiasis     History of pneumonia     History of prediabetes     Hyperlipidemia 10/10/2013    Hypertension     Hypogonadism in male     Mass of chest wall, left     Optic neuropathy, ischemic     Osteoarthritis of both hips     Paresthesias     Perforation of right tympanic membrane     chronic; Dr. Fidel Castro PSA (prostate specific antigen) - .12    Seborrheic keratosis 10/10/2013    Sensorineural hearing loss, bilateral     Stable angina (HCC)     Trigger thumb, left thumb      Past Surgical History:   Procedure Laterality Date    CARPAL TUNNEL RELEASE Right 2017    [facility] LI PEARL MD    CARPAL TUNNEL RELEASE Left 2017    [facility] PEARL MD    COLONOSCOPY  2015    Dr. Kathy Marquez      lower eyelids    [address] RESECTION  3/22/16    DR. BARRAGAN    OTHER SURGICAL HISTORY Left 2015    hydrocelectomy    TOTAL HIP ARTHROPLASTY Right 2023    Right total hip arthroplasty.  performed by Rin Ramirez MD at [address] [place] [address]      At the [facility]       Restrictions  Restrictions/Precautions  Restrictions/Precautions: Fall Risk, Weight Bearing, Surgical protocol, ROM Restrictions  Required Braces or Orthoses?: Yes  Lower Extremity Weight Bearing Restrictions  Right Lower Extremity Weight Bearing: Weight Bearing As Tolerated  Position Activity Restriction  Other position/activity restrictions: Avoid Figure 4 and active hip

## 2023-07-27 NOTE — PROGRESS NOTES
Bearing: Weight Bearing As Tolerated     Social/Functional History:  Social/Functional History  Lives With: Alone  Type of Home: House  Home Layout: One level  Home Access: Stairs to enter with rails  Entrance Stairs - Number of Steps: 3  Entrance Stairs - Rails: Right  Bathroom Shower/Tub: Tub/Shower unit, Walk-in shower (tub/shower unit--curtain and is the one he typically uses)  Bathroom Toilet: Standard  Bathroom Equipment: Grab bars in shower, Toilet raiser  Bathroom Accessibility: Walker accessible  Home Equipment: Ema Ducking, rolling, Cane  Has the patient had two or more falls in the past year or any fall with injury in the past year?: Unknown  Receives Help From: Family  ADL Assistance: [address] Independent  Homemaking Responsibilities: Yes  Meal Prep Responsibility: Primary  Laundry Responsibility: Primary (first floor)  Cleaning Responsibility: Primary  Bill Paying/Finance Responsibility: Primary (some auto pay, some checks)  Shopping Responsibility: Primary  Health Care Management: Primary (has pill bottles lined up on a tray and takes it that way)  Ambulation Assistance: Independent (no AD)  Transfer Assistance: Independent  Active : Yes  Mode of Transportation: SUV (Escape)  Education: Highschool  Occupation: Retired  Type of Occupation: [employer]  IADL Comments: Pt reports the only thinkg family does for him is make important phone calls at times d.t hearing difficulties    Current Functional Status:  ADL  Feeding: Independent  Grooming: Modified independent   UE Bathing: Supervision  LE Bathing: Supervision  UE Dressing: Modified independent   LE Dressing: Modified independent   Toileting: Modified independent   Toilet Transfers  Toilet - Technique: Ambulating  Equipment Used: Grab bars  Toilet Transfer: Modified independent  Toilet Transfers Comments: ww     Shower Transfers  [facility]:  To and From  Shower - Transfer To: [employer]  Coordination  Movements Are Fluid And Coordinated: No  Coordination and Movement Description: Fine motor impairments    D/C Recommendations:    Equipment Recommendations:  OT D/C Equipment  Equipment Needed: Yes  Equipment Recommended: Shower seat with back (hand held shower)    OT Follow Up:  OT D/C RECOMMENDATIONS  REQUIRES OT FOLLOW-UP: Yes  Type: Home OT    Home Exercise Program Provided: [x] Yes [] No  If yes, type of HEP: UE strengthening HEP     Electronically signed by:    ILANA Ventura,    7/27/2023, 8:35 AM

## 2023-07-28 NOTE — PROGRESS NOTES
[facility]   Facility/Department: [facility]  Speech Language Pathology  Discharge Report        Patient: Lester Garrett  : 1945    Date: 2023    Initial Status:  Diet:   Regular solids with thin liquids  Dysphagia Outcome Severity Scale:  Ratin    Speech Therapy Level of Assistance Scale: Auditory Comprehension:  Rating: Minimal Assistance  Verbal Expression:  Rating:Independent  Motor Speech:  Rating: Independent  Problem Solving:  Rating: Supervised Assistance  Memory:  Rating: Minimal Assistance      Long Term Goals:  Long Term Goals  Time Frame for Long Term Goals: 2-3 weeks  Goal 1: Pt will demonstrate functional cognitive-linguistic abilities in all opportunities with modified independence in order to safely complete ADLs. Patient's Response to Therapy:  Patient reported that he suspects the anesthesia negatively impacted his cognition. Patient reported that after a couple of days on rehab he felt his mind began to clear. Patient requires intermittent supervision with memory skills. No f/u with ST is warranted at this time. Discharge Status:  Diet:   No diet orders on file  Compensatory Swallowing Strategies : Upright as possible for all oral intake  Dysphagia Outcome Severity Scale:  Ratin    Speech Therapy Level of Assistance Scale: Auditory Comprehension:  Rating: Independent-Modified Independent  Verbal Expression:  Rating:Independent-Modified Independent  Motor Speech:  Rating: Independent  Problem Solving:  Rating: Modified Independent-Supervised Assistance  Memory:  Rating: Supervised Assistance        Functional Status at time of Discharge:    Cognition: Patient demonstrates minimal cognitive deficits. Language: Patient demonstrates no language deficits. Motor Speech: Patient demonstrates no motor speech deficits. Swallow: Patient demonstrates no dysphagia.                                  Patient is discharged to Home               [] Recommend

## 2023-08-02 ENCOUNTER — OFFICE VISIT (OUTPATIENT)
Dept: ORTHOPEDIC SURGERY | Age: 78
End: 2023-08-02

## 2023-08-02 DIAGNOSIS — Z96.641 S/P TOTAL RIGHT HIP ARTHROPLASTY: Primary | ICD-10-CM

## 2023-08-02 PROCEDURE — 99024 POSTOP FOLLOW-UP VISIT: CPT | Performed by: PHYSICIAN ASSISTANT

## 2023-08-02 NOTE — PROGRESS NOTES
Ortho Hip Follow Up Note    Narrative Referring Provider:   Carlos Mcintosh M.D.    PCP:   GIL Izquierdo CNP    =============================================  IMPRESSION/PLAN:  Impressions indicate:   =============================================  Rodriguez Sanchez is s/p Right total hip replacement completed on July 17, 2023. IMPRESSION:  Slow post-operative recovery. 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 and begin outpatient physical    Patient Reassurance:   Normal post-operative course discussed with patient. and Patient reassured and supported. All questions answered. Follow up:  4 weeks  X-rays needed:  no    Past Medical History:   Diagnosis Date    BPH (benign prostatic hypertrophy)     CAD (coronary artery disease)     Cerebrovascular disease     Chronic otitis externa 2/11/2016    Chronic suppurative otitis media 11/11/2015    Ear injury 2006    [address]    ED (erectile dysfunction) 11/28/2014    Hearing loss     History of nephrolithiasis     History of pneumonia 2004    History of prediabetes     Hyperlipidemia 10/10/2013    Hypertension     Hypogonadism in male     Mass of chest wall, left     Optic neuropathy, ischemic     Osteoarthritis of both hips     Paresthesias     Perforation of right tympanic membrane     chronic; Dr. Otilio Lizama PSA (prostate specific antigen) 2007- 1.12    Seborrheic keratosis 10/10/2013    Sensorineural hearing loss, bilateral     Stable angina (HCC)     Trigger thumb, left thumb        HPI:  Rodriguez Sanchez presents today for:  a routine 1st post-op visit. Status post:  Right total hip replacement    Post-operative course was complicated by:  Repair of gluteus medius and minimus tendon tears during his hip surgery    Patient rates their condition as improving.     Does the patient still experience pain? lateral hip pain     Post Op discharge

## 2023-08-04 ENCOUNTER — HOSPITAL ENCOUNTER (OUTPATIENT)
Dept: PHYSICAL THERAPY | Age: 78
Setting detail: THERAPIES SERIES
Discharge: HOME | End: 2023-08-04
Payer: MEDICARE

## 2023-08-04 PROCEDURE — 97162 PT EVAL MOD COMPLEX 30 MIN: CPT

## 2023-08-04 ASSESSMENT — PAIN DESCRIPTION - DESCRIPTORS: DESCRIPTORS: SORE

## 2023-08-04 ASSESSMENT — PAIN DESCRIPTION - PAIN TYPE: TYPE: ACUTE PAIN;SURGICAL PAIN

## 2023-08-04 ASSESSMENT — PAIN SCALES - GENERAL: PAINLEVEL_OUTOF10: 1

## 2023-08-04 ASSESSMENT — PAIN DESCRIPTION - ORIENTATION: ORIENTATION: RIGHT

## 2023-08-04 ASSESSMENT — PAIN DESCRIPTION - LOCATION: LOCATION: HIP

## 2023-08-04 NOTE — PROGRESS NOTES
[address] [facility]  PHYSICAL THERAPY PLAN OF CARE   [address] [facility] [place], [address] [facility]         Phone: [phone]  Fax: [phone]    [x] Certification  [] Recertification [x]  Plan of Care  [] Progress Note [] Discharge      Referring Provider: ZAID Gamez     From:  Carey Nelson, PT, DPT  Patient: Lanre Bustamante (80 y.o. male) : 1945 Date: 2023  Medical Diagnosis: S/P total right hip arthroplasty [Z96.641]       Treatment Diagnosis: R hip pain s/p THR, decreased gait and mobility status with deficits in R hip ROM and strength, balance deficits with gait and transfer instability    Plan of Care/Certification Expiration Date: 10/04/23   Progress Report Period from:  2023  to 2023    Visits to Date: 1 No Show: 0 Cancelled Appts: 0    OBJECTIVE:   Short Term Goals - Time Frame for Short Term Goals: 4-6 weeks    Goals Current/Discharge status  Status   Short Term Goal 1: Pt will demo ability to perform SLS on R & L legs > 10 seconds w/o hip pain or instability    Balance  Sitting - Static: Good  Sitting - Dynamic: Good  Standing - Static: Fair, -  Standing - Dynamic: Fair, -    R SLS: 0 sec, with increased pain    L SLS: 3 sec, decreased ankle/hip strategies     New   Short Term Goal 2: Pt will be ambulatory clinical distances 50-100ft w/o AD and good functional stability. Rollator for ambulation   New     Long Term Goals - Time Frame for Long Term Goals : 6-8 weeks  Goals Current/ Discharge status Status   Long Term Goal 1: Pt will demo R hip AROM WFL to independently position and perform bed mobility, transfers, floor transfers, and gait w/o pain. Bed mobility - increased effort, INDP    Transfers: increased effort, INDP    Floor Transfer: NT, hip precautions w/ brace    Gait: MI, rollator   New   Long Term Goal 2: Pt will demo 5/5 R hip & claire LE strength with ability to reciprocally ambulate steps and perform floor transfers at an independent level.   Strength

## 2023-08-04 NOTE — PROGRESS NOTES
[address] [facility]  PHYSICAL THERAPY EVALUATION      Physical Therapy: Initial Evaluation    Patient: Soumya Victoria (35 y.o.     male)   Examination Date: 2023   :  1945 ;    Confirmed: Yes MRN: [id number]  CSN: [id number]   Insurance: Payor: MEDICARE / Plan: MEDICARE PART A AND B / Product Type: *No Product type* /   Insurance ID: [id number] - (Medicare)  PT Insurance Information: Medicare Secondary Insurance (if applicable): BCBS    Referring Physician: ZAID Marina       Visits to Date/Visits Approved:  (BMN; supplemental 60V)    No Show/Cancelled Appts: 0      Medical Diagnosis: S/P total right hip arthroplasty [U29.517]        Treatment Diagnosis: R hip pain s/p THR, decreased gait and mobility status with deficits in R hip ROM and strength, balance deficits with gait and transfer instability     PERTINENT MEDICAL HISTORY   Patient Assessed for Rehabilitation Services: Yes       Medical History: Chart Reviewed: Yes   Past Medical History:   Diagnosis Date    BPH (benign prostatic hypertrophy)     CAD (coronary artery disease)     Cerebrovascular disease     Chronic otitis externa 2016    Chronic suppurative otitis media 2015    Ear injury 2006    [address]    ED (erectile dysfunction) 2014    Hearing loss     History of nephrolithiasis     History of pneumonia     History of prediabetes     Hyperlipidemia 10/10/2013    Hypertension     Hypogonadism in male     Mass of chest wall, left     Optic neuropathy, ischemic     Osteoarthritis of both hips     Paresthesias     Perforation of right tympanic membrane     chronic; Dr. Henry Herron    Screening PSA (prostate specific antigen) 2007- 1.12    Seborrheic keratosis 10/10/2013    Sensorineural hearing loss, bilateral     Stable angina (HCC)     Trigger thumb, left thumb      Surgical History:   Past Surgical History:   Procedure Laterality Date    CARPAL TUNNEL RELEASE

## 2023-08-07 ENCOUNTER — OFFICE VISIT (OUTPATIENT)
Dept: FAMILY MEDICINE CLINIC | Age: 78
End: 2023-08-07
Payer: MEDICARE

## 2023-08-07 VITALS
OXYGEN SATURATION: 98 % | WEIGHT: 216 LBS | HEIGHT: 69 IN | HEART RATE: 81 BPM | DIASTOLIC BLOOD PRESSURE: 78 MMHG | SYSTOLIC BLOOD PRESSURE: 120 MMHG | BODY MASS INDEX: 31.99 KG/M2

## 2023-08-07 DIAGNOSIS — Z09 HOSPITAL DISCHARGE FOLLOW-UP: Primary | ICD-10-CM

## 2023-08-07 PROCEDURE — G8427 DOCREV CUR MEDS BY ELIG CLIN: HCPCS | Performed by: NURSE PRACTITIONER

## 2023-08-07 PROCEDURE — 1111F DSCHRG MED/CURRENT MED MERGE: CPT | Performed by: NURSE PRACTITIONER

## 2023-08-07 PROCEDURE — 3078F DIAST BP <80 MM HG: CPT | Performed by: NURSE PRACTITIONER

## 2023-08-07 PROCEDURE — 3074F SYST BP LT 130 MM HG: CPT | Performed by: NURSE PRACTITIONER

## 2023-08-07 PROCEDURE — 1123F ACP DISCUSS/DSCN MKR DOCD: CPT | Performed by: NURSE PRACTITIONER

## 2023-08-07 PROCEDURE — 99213 OFFICE O/P EST LOW 20 MIN: CPT | Performed by: NURSE PRACTITIONER

## 2023-08-07 PROCEDURE — 1036F TOBACCO NON-USER: CPT | Performed by: NURSE PRACTITIONER

## 2023-08-07 PROCEDURE — G8417 CALC BMI ABV UP PARAM F/U: HCPCS | Performed by: NURSE PRACTITIONER

## 2023-08-07 NOTE — PROGRESS NOTES
Post-Discharge Transitional Care Follow Up      Dorota Cool   YOB: 1945    Date of Office Visit:  8/7/2023  Date of Hospital Admission: 7/18/23  Date of Hospital Discharge: 7/26/23  Readmission Risk Score (high >=14%. Medium >=10%):Readmission Risk Score: 10.3      Care management risk score Rising risk (score 2-5) and Complex Care (Scores >=6): No Risk Score On File     Non face to face  following discharge, date last encounter closed (first attempt may have been earlier): *No documented post hospital discharge outreach found in the last 14 days     Call initiated 2 business days of discharge: *No response recorded in the last 14 days     Hospital discharge follow-up  -     DE DISCHARGE MEDS RECONCILED W/ CURRENT OUTPATIENT MED LIST    FU prn. Subjective:   HPI    Inpatient course: Discharge summary reviewed- see chart. Interval history/Current status: Pt had hip replacement and ligament repair. Had trouble with the anesthesia. States that he was \"crazy\" with it. Down 10 lbs, eating less on purpose. Was d/c to acute rehab. Now in outpatient therapy    Pt reports that overall he is doing well. Incision looks good. Happy with his recovery thus far      Patient Active Problem List   Diagnosis    Hypertension    Benign prostatic hyperplasia with incomplete bladder emptying    Cerebrovascular disease    ED (erectile dysfunction)    Sensorineural hearing loss, bilateral    Hyperlipidemia    CAD (coronary artery disease)    Seborrheic keratoses    Chronic otitis externa    Chronic suppurative otitis media    Trigger thumb, left thumb    Hypogonadism in male    Perforation of right tympanic membrane    Morbidly obese (HCC)    Status post total hip replacement, right    Impaired mobility and activities of daily living dt  Right total hip arthroplasty.  (Right: Hip)    Gluteal tendinitis of right buttock    Post-op pain    Encephalopathy acute    Nocturnal hypoxia    Sleep apnea

## 2023-08-08 ENCOUNTER — HOSPITAL ENCOUNTER (OUTPATIENT)
Dept: PHYSICAL THERAPY | Age: 78
Setting detail: THERAPIES SERIES
Discharge: HOME | End: 2023-08-08
Payer: MEDICARE

## 2023-08-08 PROCEDURE — 97110 THERAPEUTIC EXERCISES: CPT

## 2023-08-08 NOTE — PROGRESS NOTES
[address] [facility]  Outpatient Physical Therapy    Treatment Note        Date: 2023  Patient: Owen Grayson  : 1945   Confirmed: Yes  MRN: [id number]  Referring Provider: ZAID Newell    Medical Diagnosis: S/P total right hip arthroplasty [Z96.641]       Treatment Diagnosis: R hip pain s/p THR, decreased gait and mobility status with deficits in R hip ROM and strength, balance deficits with gait and transfer instability    Visit Information:  Insurance: Payor: Luis Alberto Stone / Plan: MEDICARE PART A AND B / Product Type: *No Product type* /   PT Visit Information  Onset Date: 23  PT Insurance Information: Medicare  Total # of Visits Approved: 99  Total # of Visits to Date: 2  Plan of Care/Certification Expiration Date: 10/04/23  No Show: 0  Progress Note Due Date: 23  Canceled Appointment: 0  Progress Note Counter:     Subjective Information:  Subjective: Pt reports no pain today coming into therapy currently. Reports better sleep since being allow to take brace off at night. HEP Compliance:  [x] Good [] Fair [] Poor [] Reports not doing due to:    Pain Screening  Patient Currently in Pain: Denies    Treatment:  Exercises:  Exercises  Exercise 1: Sports Art Bike Seat 11 Lvl 1 x 5 min. Exercise 2: sink exercises (no hip abduction or adduction):  HRs / Mini Squats  Exercise 3: Standing statice balance on AirEx foam 20 sec hold x 3  Exercise 4: apollo double leg press  10# 3 x 10  Exercise 6: Gait drills with 6 inch hurdles, reciprocal pattern, step overs at random intervals x2 laps. Exercise 7: Sit-stand from Mat table knees at 90* x 10  Exercise 8: Step ups 6 inch performed at Stairs with Jarvis HRs (pt on 2 inch box stepping up on 1st step back down)         Objective Measures:          Assessment:    Body Structures, Functions, Activity Limitations Requiring Skilled Therapeutic Intervention: Increased pain, Decreased strength, Decreased functional mobility ,

## 2023-08-10 ENCOUNTER — HOSPITAL ENCOUNTER (OUTPATIENT)
Dept: PHYSICAL THERAPY | Age: 78
Setting detail: THERAPIES SERIES
Discharge: HOME | End: 2023-08-10
Payer: MEDICARE

## 2023-08-10 PROCEDURE — 97110 THERAPEUTIC EXERCISES: CPT

## 2023-08-10 NOTE — PROGRESS NOTES
[address] [facility]  Outpatient Physical Therapy    Treatment Note        Date: 8/10/2023  Patient: Lena Benton  : 1945   Confirmed: Yes  MRN: [id number]  Referring Provider: ZAID Cantu    Medical Diagnosis: S/P total right hip arthroplasty [Z96.641]       Treatment Diagnosis: R hip pain s/p THR, decreased gait and mobility status with deficits in R hip ROM and strength, balance deficits with gait and transfer instability    Visit Information:  Insurance: Payor: Gianna Dennis / Plan: MEDICARE PART A AND B / Product Type: *No Product type* /   PT Visit Information  Onset Date: 23  PT Insurance Information: Medicare  Total # of Visits Approved: 99  Total # of Visits to Date: 3  Plan of Care/Certification Expiration Date: 10/04/23  No Show: 0  Progress Note Due Date: 23  Canceled Appointment: 0  Progress Note Counter: 3/16    Subjective Information:  Subjective: Pt reports \"very minor pain\" this date. Says he has been trying walk more. HEP Compliance:  [x] Good [] Fair [] Poor [] Reports not doing due to:    Pain Screening  Patient Currently in Pain: Denies    Treatment:  Exercises:  Exercises  Exercise 1: Sports Art Bike Seat 11 Lvl 1 x 5 min. Exercise 2: sink exercises (no hip abduction or adduction):  HRs / Mini Squats x 15 ea  Exercise 3: Standing static balance 20 sec hold x 3  Exercise 4: apollo double leg press  10# 3 x 10  Exercise 5: LAQ x 10-15 / Hamstring Curls(RTB) x 10-15  Exercise 6: Gait drills with 6 inch hurdles, reciprocal pattern, step overs at random intervals x6 laps. Exercise 7: Sit-stand from Mat table knees at 90* x 10  Exercise 8: Step ups 6 inch performed at Stairs with 1 UE x 10     Objective Measures:        Assessment:    Body Structures, Functions, Activity Limitations Requiring Skilled Therapeutic Intervention: Increased pain, Decreased strength, Decreased functional mobility , Decreased balance, Decreased high-level IADLs  Assessment:

## 2023-08-14 ENCOUNTER — HOSPITAL ENCOUNTER (OUTPATIENT)
Dept: PHYSICAL THERAPY | Age: 78
Setting detail: THERAPIES SERIES
Discharge: HOME | End: 2023-08-14
Payer: MEDICARE

## 2023-08-14 PROCEDURE — 97110 THERAPEUTIC EXERCISES: CPT

## 2023-08-14 ASSESSMENT — PAIN DESCRIPTION - LOCATION: LOCATION: HIP

## 2023-08-14 ASSESSMENT — PAIN DESCRIPTION - ORIENTATION: ORIENTATION: RIGHT

## 2023-08-14 ASSESSMENT — PAIN DESCRIPTION - PAIN TYPE: TYPE: ACUTE PAIN;SURGICAL PAIN

## 2023-08-14 ASSESSMENT — PAIN DESCRIPTION - DESCRIPTORS: DESCRIPTORS: SORE;ACHING

## 2023-08-14 ASSESSMENT — PAIN SCALES - GENERAL: PAINLEVEL_OUTOF10: 2

## 2023-08-14 NOTE — PROGRESS NOTES
8/14/23: Mild achiness in the R hip when performing SLS. STG 2 Current Status[de-identified] 8/14/23: Self reports ability to walk over a half mile without AD however slight lateral trunk lean. Assessment: Body Structures, Functions, Activity Limitations Requiring Skilled Therapeutic Intervention: Increased pain, Decreased strength, Decreased functional mobility , Decreased balance, Decreased high-level IADLs  Assessment: Pt continued to tolerate current exercises as well as additional standing exercises this date. Good follow through with instructions and displays safe STS mechanics from mat table as well as other equipment utilized in therapy. Noted mild increases in Rt hip achiness at then end of the session but notes similar pain level to when they arrived to session. Treatment Diagnosis: R hip pain s/p THR, decreased gait and mobility status with deficits in R hip ROM and strength, balance deficits with gait and transfer instability  Therapy Prognosis: Good       Post-Pain Assessment:       Pain Rating (0-10 pain scale):  2 /10   Location and pain description same as pre-treatment unless indicated. Action: [] NA   [x] Perform HEP  [] Meds as prescribed  [] Modalities as prescribed   [] Call Physician     GOALS   Patient Goal(s): Patient Goals : get back to gardening and PLOF    Short Term Goals Completed by 4-6 weeks Goal Status   STG 1 Pt will demo ability to perform SLS on R & L legs > 10 seconds w/o hip pain or instability In progress   STG 2 Pt will be ambulatory clinical distances 50-100ft w/o AD and good functional stability. In progress     Long Term Goals Completed by 6-8 weeks Goal Status   LTG 1 Pt will demo R hip AROM WFL to independently position and perform bed mobility, transfers, floor transfers, and gait w/o pain. In progress   LTG 2 Pt will demo 5/5 R hip & claire LE strength with ability to reciprocally ambulate steps and perform floor transfers at an independent level.  In progress   LTG

## 2023-08-17 ENCOUNTER — HOSPITAL ENCOUNTER (OUTPATIENT)
Dept: PHYSICAL THERAPY | Age: 78
Setting detail: THERAPIES SERIES
Discharge: HOME | End: 2023-08-17
Payer: MEDICARE

## 2023-08-17 PROCEDURE — 97110 THERAPEUTIC EXERCISES: CPT

## 2023-08-17 NOTE — PROGRESS NOTES
[address] [facility]  Outpatient Physical Therapy    Treatment Note        Date: 2023  Patient: Larry Velarde  : 1945   Confirmed: Yes  MRN: [id number]  Referring Provider: ZAID Dorantes    Medical Diagnosis: S/P total right hip arthroplasty [Z96.641]       Treatment Diagnosis: R hip pain s/p THR, decreased gait and mobility status with deficits in R hip ROM and strength, balance deficits with gait and transfer instability    Visit Information:  Insurance: Payor: [facility][note 3] [address] / Plan: MEDICARE PART A AND B / Product Type: *No Product type* /   PT Visit Information  Onset Date: 23  PT Insurance Information: Medicare  Total # of Visits Approved: 99  Total # of Visits to Date: 5  Plan of Care/Certification Expiration Date: 10/04/23  No Show: 0  Progress Note Due Date: 23  Canceled Appointment: 0  Progress Note Counter:     Subjective Information:  Subjective: Pt reports \"very minor\" pain this date. Says he has been walking a lot more and says he walks around his block 3 times, which almost 1 mile in length. HEP Compliance:  [x] Good [] Fair [] Poor [] Reports not doing due to:    Pain Screening  Patient Currently in Pain: Denies    Treatment:  Exercises:  Exercises  Exercise 1: NuStep: Seat 12 Lvl 5 x 5 min. Exercise 2: sink exercises (no hip abduction or adduction):  hip flexion, extension x 15 ea YTB  Exercise 3: SLS 7 x 10-15\" on RLE  Exercise 4: Gait (focus on gait mechanics and symmetrical steps) x 300ft  Exercise 5: LAQ x 15 / Hamstring Curls(RTB) x 10-15  Exercise 6: Hip Bridge x10  Exercise 7: Sit-stand from Mat table knees at 90* x 10-15  Exercise 8: Step ups 6 inch with 1 UE x 10-15  Exercise 9: SLR x 2 - attempted but held after initial reps, reduced ROM as well as increased mild pain reported  Exercise 20: HEP: Cont Current     Objective Measures:       Assessment:    Body Structures, Functions, Activity Limitations Requiring Skilled Therapeutic

## 2023-08-21 ENCOUNTER — HOSPITAL ENCOUNTER (OUTPATIENT)
Dept: PHYSICAL THERAPY | Age: 78
Setting detail: THERAPIES SERIES
Discharge: HOME | End: 2023-08-21
Payer: MEDICARE

## 2023-08-21 PROCEDURE — 97110 THERAPEUTIC EXERCISES: CPT

## 2023-08-21 NOTE — PROGRESS NOTES
Pt to return tonight to ED for Vanco. Copies of medication list and upcoming appointments given prior to discharge.     
education & training, Patient/Caregiver education & training, Equipment evaluation, education, & procurement, Modalities, Therapeutic activities  Modalities: Heat/Cold  Pt to continue current HEP. See objective section for any therapeutic exercise changes, additions or modifications this date.     Therapy Time:      PT Individual Minutes  Time In: 2379  Time Out: 1010  Minutes: 43  Timed Code Treatment Minutes: 43 Minutes  Procedure Minutes: 0  Timed Activity Minutes Units   Ther Ex 43 3   Electronically signed by Manuel Kate PTA on 8/21/23 at 10:13 AM EDT

## 2023-08-24 ENCOUNTER — HOSPITAL ENCOUNTER (OUTPATIENT)
Dept: PHYSICAL THERAPY | Age: 78
Setting detail: THERAPIES SERIES
Discharge: HOME | End: 2023-08-24
Payer: MEDICARE

## 2023-08-24 PROCEDURE — 97110 THERAPEUTIC EXERCISES: CPT

## 2023-08-24 NOTE — PROGRESS NOTES
[address] [facility]  Outpatient Physical Therapy    Treatment Note        Date: 2023  Patient: Mehdi Session  : 1945   Confirmed: Yes  MRN: [id number]  Referring Provider: ZAID Black    Medical Diagnosis: S/P total right hip arthroplasty [Z96.641]       Treatment Diagnosis: R hip pain s/p THR, decreased gait and mobility status with deficits in R hip ROM and strength, balance deficits with gait and transfer instability    Visit Information:  Insurance: Payor: Lavon Mederos / Plan: MEDICARE PART A AND B / Product Type: *No Product type* /   PT Visit Information  Onset Date: 23  PT Insurance Information: Medicare  Total # of Visits Approved: 99  Total # of Visits to Date: 7  Plan of Care/Certification Expiration Date: 10/04/23  No Show: 0  Progress Note Due Date: 23  Canceled Appointment: 0  Progress Note Counter:     Subjective Information:  Subjective: Pt states his hip has been feeling good. Hasn't done much activity wise. HEP Compliance:  [x] Good [] Fair [] Poor [] Reports not doing due to:    Pain Screening  Patient Currently in Pain: Denies    Treatment:  Exercises:  Exercises  Exercise 1: Sports Art Bike Seat 11 Lvl 5 x 5 min. Exercise 2: sink exercises (no hip abduction or adduction):  extension 2 x 15 ea claire (1 x 15 without resistance, 1 x 15 2# ankle weight) , Squats 2 x 15  Exercise 3: SLS 3 x20s  Exercise 4: Hip Bridge (LE on pball) 2 x10-5s  Exercise 6: LAQ 2 x 15 (2# ankle weight) / Hamstring curls 2 x 15 GTB  Exercise 8: Step ups 6 inch with 1 UE x 10-15  Exercise 20: HEP: Cont Current     Objective Measures:                Assessment:    Body Structures, Functions, Activity Limitations Requiring Skilled Therapeutic Intervention: Increased pain, Decreased strength, Decreased functional mobility , Decreased balance, Decreased high-level IADLs  Assessment: Pt is continuing to display good tolerance with LE strengthening performed in therapy

## 2023-08-28 ENCOUNTER — HOSPITAL ENCOUNTER (OUTPATIENT)
Dept: PHYSICAL THERAPY | Age: 78
Setting detail: THERAPIES SERIES
Discharge: HOME | End: 2023-08-28
Payer: MEDICARE

## 2023-08-28 PROCEDURE — 97110 THERAPEUTIC EXERCISES: CPT

## 2023-08-28 PROCEDURE — 97112 NEUROMUSCULAR REEDUCATION: CPT

## 2023-08-28 ASSESSMENT — PAIN DESCRIPTION - ORIENTATION: ORIENTATION: RIGHT

## 2023-08-28 ASSESSMENT — PAIN DESCRIPTION - LOCATION: LOCATION: HIP

## 2023-08-28 ASSESSMENT — PAIN SCALES - GENERAL: PAINLEVEL_OUTOF10: 0

## 2023-08-28 NOTE — PROGRESS NOTES
[facility]  PHYSICAL THERAPY PLAN OF CARE   [address] [facility] [place], [address] [facility]         Phone: [phone]  Fax: [phone]    [] Certification  [] Recertification []  Plan of Care  [x] Progress Note [] Discharge      Referring Provider: ZAID Miramontes     From:  Annamaria Grove, PT, DPT  Patient: Juan M Lopez (70 y.o. male) : 1945 Date: 2023  Medical Diagnosis: S/P total right hip arthroplasty [Z96.641]       Treatment Diagnosis: R hip pain s/p THR, decreased gait and mobility status with deficits in R hip ROM and strength, balance deficits with gait and transfer instability    Plan of Care/Certification Expiration Date: 10/04/23   Progress Report Period from:  2023  to 2023    Visits to Date: 8 No Show: 0 Cancelled Appts: 0    OBJECTIVE:   Short Term Goals - Time Frame for Short Term Goals: 4-6 weeks    Goals Current/Discharge status  Status   Short Term Goal 1: Pt will demo ability to perform SLS on R & L legs > 10 seconds w/o hip pain or instability   </= 3 seconds   In progress   Short Term Goal 2: Pt will be ambulatory clinical distances 50-100ft w/o AD and good functional stability. Ambulatory device free in community   Met     Long Term Goals - Time Frame for Long Term Goals : 6-8 weeks  Goals Current/ Discharge status Status   Long Term Goal 1: Pt will demo R hip AROM WFL to independently position and perform bed mobility, transfers, floor transfers, and gait w/o pain. Hip flex/ext [facility]    Hip ABD/ADD restricted at this time per brace and physicians orders   In progress   Long Term Goal 2: Pt will demo 5/5 R hip & claire LE strength with ability to reciprocally ambulate steps and perform floor transfers at an independent level.   Strength improving; focus to date on sagittal plane movements    Lateral movements to be addressed upon his brace being discontinued   In progress   Long Term Goal 3: Pt will be ambulatory unrestricted distances independently
Body Structures, Functions, Activity Limitations Requiring Skilled Therapeutic Intervention: Increased pain, Decreased strength, Decreased functional mobility , Decreased balance, Decreased high-level IADLs  Assessment: Pt verbalizes improving function outside of clinic. Cont'd sagittal plane strengthening as pt is still required to wear hip brace that restricts hip ABD and ADD. Pt demo's good tolerance to strengthening exercises. Challenge still noted with SLS exercises, requiring UE support and SBS/CGA from therapist for safety. Plan to continue addressing strength and balance with new hip exercises to be included upon pt's medical clearance to discontinue hip hip brace. Treatment Diagnosis: R hip pain s/p THR, decreased gait and mobility status with deficits in R hip ROM and strength, balance deficits with gait and transfer instability  Therapy Prognosis: Good     Post-Pain Assessment:       Pain Rating (0-10 pain scale):  0 /10   Location and pain description same as pre-treatment unless indicated. Action: [x] NA   [] Perform HEP  [] Meds as prescribed  [] Modalities as prescribed   [] Call Physician     GOALS   Patient Goal(s): Patient Goals : get back to gardening and PLOF    Short Term Goals Completed by 4-6 weeks Goal Status   STG 1 Pt will demo ability to perform SLS on R & L legs > 10 seconds w/o hip pain or instability In progress   STG 2 Pt will be ambulatory clinical distances 50-100ft w/o AD and good functional stability. Met     Long Term Goals Completed by 6-8 weeks Goal Status   LTG 1 Pt will demo R hip AROM WFL to independently position and perform bed mobility, transfers, floor transfers, and gait w/o pain. In progress   LTG 2 Pt will demo 5/5 R hip & claire LE strength with ability to reciprocally ambulate steps and perform floor transfers at an independent level. In progress   LTG 3 Pt will be ambulatory unrestricted distances independently with LRAD/no AD to demo return to PLOF.  In progress

## 2023-08-31 ENCOUNTER — HOSPITAL ENCOUNTER (OUTPATIENT)
Dept: PHYSICAL THERAPY | Age: 78
Setting detail: THERAPIES SERIES
Discharge: HOME | End: 2023-08-31
Payer: MEDICARE

## 2023-08-31 PROCEDURE — 97110 THERAPEUTIC EXERCISES: CPT

## 2023-08-31 NOTE — PROGRESS NOTES
[address] [facility]  Outpatient Physical Therapy    Treatment Note        Date: 2023  Patient: Romi Servin  : 1945   Confirmed: Yes  MRN: [id number]  Referring Provider: ZAID Ragsdale    Medical Diagnosis: S/P total right hip arthroplasty [Z96.641]       Treatment Diagnosis: R hip pain s/p THR, decreased gait and mobility status with deficits in R hip ROM and strength, balance deficits with gait and transfer instability    Visit Information:  Insurance: Payor: Anjali Acosta / Plan: MEDICARE PART A AND B / Product Type: *No Product type* /   PT Visit Information  Onset Date: 23  PT Insurance Information: Medicare  Total # of Visits Approved: 99  Total # of Visits to Date: 9  Plan of Care/Certification Expiration Date: 10/04/23  No Show: 0  Progress Note Due Date: 23  Canceled Appointment: 0  Progress Note Counter:     Subjective Information:  Subjective: Pt reports he has been able to increase his activity level at his [employer] shop and use the elliptical for ~ 20 minutes. HEP Compliance:  [x] Good [] Fair [] Poor [] Reports not doing due to:    Pain Screening  Patient Currently in Pain: Denies    Treatment:  Exercises:  Exercises  Exercise 1: SLS 5 x 10\"  Exercise 2: Step-up marches with unilateral UE support: 2 x 10 claire  Exercise 3: hip adduction isos with ball: 5\" x 20  Exercise 4: Walking march with unilateral walking stick, 20ft lap x 4 (SBA needed for safety)  Exercise 5: STS w/o UE assist: 2 x 10 (table lowered in height to just above 90*)  Exercise 6: Hip bridge with pball between knees x 20 -5\"  Exercise 7: sagittal plane fwd step with reach, retro step back to neutral: x 15 claire  Exercise 20: HEP: Cont Current     Objective Measures:        STG 1 Current Status[de-identified] 23: R: >10 s, L: 6-8s without UE support  STG 2 Current Status[de-identified] 23: Self reports ability to walk over a half mile without AD however slight lateral trunk lean.            LTG 1 Current
additional visits:     [] Hold        Signature:     Objective information by: Electronically signed by Dianelys Eric PTA on 8/31/23 at 12:01 PM EDT      If you have any questions or concerns, please don't hesitate to call.   Thank you for your referral.

## 2023-09-04 ENCOUNTER — APPOINTMENT (OUTPATIENT)
Dept: PHYSICAL THERAPY | Age: 78
End: 2023-09-04
Payer: MEDICARE

## 2023-09-05 ENCOUNTER — OFFICE VISIT (OUTPATIENT)
Dept: ORTHOPEDIC SURGERY | Age: 78
End: 2023-09-05

## 2023-09-05 ENCOUNTER — HOSPITAL ENCOUNTER (OUTPATIENT)
Dept: PHYSICAL THERAPY | Age: 78
Setting detail: THERAPIES SERIES
Discharge: HOME | End: 2023-09-05
Payer: MEDICARE

## 2023-09-05 VITALS
TEMPERATURE: 97 F | OXYGEN SATURATION: 96 % | WEIGHT: 216 LBS | HEIGHT: 68 IN | HEART RATE: 70 BPM | BODY MASS INDEX: 32.74 KG/M2

## 2023-09-05 DIAGNOSIS — Z96.641 S/P TOTAL RIGHT HIP ARTHROPLASTY: Primary | ICD-10-CM

## 2023-09-05 PROCEDURE — 97110 THERAPEUTIC EXERCISES: CPT

## 2023-09-05 PROCEDURE — 99024 POSTOP FOLLOW-UP VISIT: CPT | Performed by: PHYSICIAN ASSISTANT

## 2023-09-05 NOTE — PROGRESS NOTES
Ortho Hip Follow Up Note    Narrative Referring Provider:   Anne-Marie Espinoza M.D.    PCP:   GIL Palomo CNP    =============================================  IMPRESSION/PLAN:  Impressions indicate:   =============================================  Goldie Mejia is s/p Right total hip replacement completed on July 17 2023. IMPRESSION:  No complaints or limitations. and At normal post-operative stage of recovery. PLAN:    Rest, Ice, Compression, Elevation PRN. and Continue physical therapy. Patient Reassurance:   Normal post-operative course discussed with patient. and explained to the patient he may come out of his abductor brace the first 4 hours of the day. Then he needs to go back into the brace. Follow up:  4 weeks  X-rays needed:  no    Past Medical History:   Diagnosis Date    BPH (benign prostatic hypertrophy)     CAD (coronary artery disease)     Cerebrovascular disease     Chronic otitis externa 2/11/2016    Chronic suppurative otitis media 11/11/2015    Ear injury 2006    [address]    ED (erectile dysfunction) 11/28/2014    Hearing loss     History of nephrolithiasis     History of pneumonia 2004    History of prediabetes     Hyperlipidemia 10/10/2013    Hypertension     Hypogonadism in male     Mass of chest wall, left     Optic neuropathy, ischemic     Osteoarthritis of both hips     Paresthesias     Perforation of right tympanic membrane     chronic; Dr. 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 Does the patient still experience pain?

## 2023-09-05 NOTE — PROGRESS NOTES
[address] [facility]  Outpatient Physical Therapy    Treatment Note        Date: 2023  Patient: Nicolas Jones  : 1945   Confirmed: Yes  MRN: [id number]  Referring Provider: ZAID Hager    Medical Diagnosis: S/P total right hip arthroplasty [Z96.641]       Treatment Diagnosis: R hip pain s/p THR, decreased gait and mobility status with deficits in R hip ROM and strength, balance deficits with gait and transfer instability    Visit Information:  Insurance: Payor: Saundra Araiza / Plan: MEDICARE PART A AND B / Product Type: *No Product type* /   PT Visit Information  Onset Date: 23  PT Insurance Information: Medicare  Total # of Visits Approved: 99  Total # of Visits to Date: 10  Plan of Care/Certification Expiration Date: 10/04/23  No Show: 0  Progress Note Due Date: 23  Canceled Appointment: 0  Progress Note Counter: 10/16    Subjective Information:  Subjective: Pt arrives this session without hip brace donned. Pt reports the MD says \"he's ahead of schedule\" and to discontinue the brace for 4 hours at a time. Says to also wear it if he feels fatigue or going out for prolonged periods. HEP Compliance:  [x] Good [] Fair [] Poor [] Reports not doing due to:    Pain Screening  Patient Currently in Pain: Denies    Treatment:  Exercises:  Exercises  Exercise 1: Standing March/hip abduction on R x 15  Exercise 2: Step-ups (forward/lateral):  x 10-15 ea  Exercise 3: hip adduction isos with ball: 5\" x 15 / hip abduction x 15 YTB  Exercise 4: Stairs: 8\" x 8 with single HR support (non reciprocal, pain denied)  Exercise 5: SLR x 10  Exercise 6: Hip bridge = x 20 -5\"  Exercise 20: HEP: Cont Current   Objective Measures:            Assessment:    Body Structures, Functions, Activity Limitations Requiring Skilled Therapeutic Intervention: Increased pain, Decreased strength, Decreased functional mobility , Decreased balance, Decreased high-level IADLs  Assessment: Progressed patient

## 2023-09-07 ENCOUNTER — HOSPITAL ENCOUNTER (OUTPATIENT)
Dept: PHYSICAL THERAPY | Age: 78
Setting detail: THERAPIES SERIES
Discharge: HOME | End: 2023-09-07
Payer: MEDICARE

## 2023-09-07 PROCEDURE — 97110 THERAPEUTIC EXERCISES: CPT

## 2023-09-07 NOTE — PROGRESS NOTES
[facility]  Outpatient Physical Therapy    Treatment Note        Date: 2023  Patient: Juan M Lopez  : 1945   Confirmed: Yes  MRN: [id number]  Referring Provider: ZAID Miramontes    Medical Diagnosis: S/P total right hip arthroplasty [Z96.641]       Treatment Diagnosis: R hip pain s/p THR, decreased gait and mobility status with deficits in R hip ROM and strength, balance deficits with gait and transfer instability    Visit Information:  Insurance: Payor: [facility] [address] / Plan: MEDICARE PART A AND B / Product Type: *No Product type* /   PT Visit Information  Onset Date: 23  PT Insurance Information: Medicare  Total # of Visits Approved: 99  Total # of Visits to Date:   Plan of Care/Certification Expiration Date: 10/04/23  No Show: 0  Progress Note Due Date: 23  Canceled Appointment: 0  Progress Note Counter:     Subjective Information:  Subjective: Pt believes he strained his R calf a few days ago, reporting it is sore today behind his knee. Pt reports good compliance to wean out process of R hip abductor brace. HEP Compliance:  [x] Good [] Fair [] Poor [] Reports not doing due to:    Pain Screening  Patient Currently in Pain: Denies    Treatment:  Exercises:  Exercises  Exercise 1: recumbent bike: L3 x 6 min  Exercise 2: gastroc stretch with SB, standing: x 2 min claire  Exercise 3: standing hip ABD AROM: 3x10 claire  Exercise 4: seated hip ABD (green band around knees): 5\" x 30  Exercise 5: apollo leg press: 1 plate x 15, 2 plates 3B41       *Indicates exercise, modality, or manual techniques to be initiated when appropriate    Objective Measures:     Assessment: Body Structures, Functions, Activity Limitations Requiring Skilled Therapeutic Intervention: Increased pain, Decreased strength, Decreased functional mobility , Decreased balance, Decreased high-level IADLs  Assessment: Pt progessing well with all strengthening activities.  Hip abduction activities

## 2023-09-11 ENCOUNTER — HOSPITAL ENCOUNTER (OUTPATIENT)
Dept: PHYSICAL THERAPY | Age: 78
Setting detail: THERAPIES SERIES
Discharge: HOME | End: 2023-09-11
Payer: MEDICARE

## 2023-09-11 PROCEDURE — 97110 THERAPEUTIC EXERCISES: CPT

## 2023-09-11 ASSESSMENT — PAIN DESCRIPTION - DESCRIPTORS: DESCRIPTORS: CRAMPING

## 2023-09-11 ASSESSMENT — PAIN SCALES - GENERAL: PAINLEVEL_OUTOF10: 4

## 2023-09-11 ASSESSMENT — PAIN DESCRIPTION - PAIN TYPE: TYPE: ACUTE PAIN

## 2023-09-11 ASSESSMENT — PAIN DESCRIPTION - LOCATION: LOCATION: LEG

## 2023-09-11 ASSESSMENT — PAIN DESCRIPTION - ORIENTATION: ORIENTATION: RIGHT

## 2023-09-11 NOTE — PROGRESS NOTES
Body Structures, Functions, Activity Limitations Requiring Skilled Therapeutic Intervention: Increased pain, Decreased strength, Decreased functional mobility , Decreased balance, Decreased high-level IADLs  Assessment: Continues to progress well and tolerate additional hip abduction exercises to increase hip stability and strength. Demonstrates good exercise technique and no increases in pain when performing increased repetitions this date. Pt would benefit from continued strengthening to improve gait mechanics and ADLs. Treatment Diagnosis: R hip pain s/p THR, decreased gait and mobility status with deficits in R hip ROM and strength, balance deficits with gait and transfer instability  Therapy Prognosis: Good       Post-Pain Assessment:       Pain Rating (0-10 pain scale):  \"I feel pretty good. \" /10   Location and pain description same as pre-treatment unless indicated. Action: [] NA   [x] Perform HEP  [] Meds as prescribed  [] Modalities as prescribed   [] Call Physician     GOALS   Patient Goal(s): Patient Goals : get back to gardening and PLOF    Short Term Goals Completed by 4-6 weeks Goal Status   STG 1 Pt will demo ability to perform SLS on R & L legs > 10 seconds w/o hip pain or instability In progress   STG 2 Pt will be ambulatory clinical distances 50-100ft w/o AD and good functional stability. Met     Long Term Goals Completed by 6-8 weeks Goal Status   LTG 1 Pt will demo R hip AROM WFL to independently position and perform bed mobility, transfers, floor transfers, and gait w/o pain. In progress   LTG 2 Pt will demo 5/5 R hip & claire LE strength with ability to reciprocally ambulate steps and perform floor transfers at an independent level. In progress   LTG 3 Pt will be ambulatory unrestricted distances independently with LRAD/no AD to demo return to PLOF. Met   LTG 4 Pt will be independent with HEP to continue exercises upon therapy discharge.  In progress

## 2023-09-14 ENCOUNTER — HOSPITAL ENCOUNTER (OUTPATIENT)
Dept: PHYSICAL THERAPY | Age: 78
Setting detail: THERAPIES SERIES
Discharge: HOME | End: 2023-09-14
Payer: MEDICARE

## 2023-09-14 PROCEDURE — 97110 THERAPEUTIC EXERCISES: CPT

## 2023-09-14 NOTE — PROGRESS NOTES
[address] [facility]  Outpatient Physical Therapy    Treatment Note        Date: 2023  Patient: Jakub Montesinos  : 1945   Confirmed: Yes  MRN: [id number]  Referring Provider: ZAID Barrera    Medical Diagnosis: S/P total right hip arthroplasty [Z96.641]       Treatment Diagnosis: R hip pain s/p THR, decreased gait and mobility status with deficits in R hip ROM and strength, balance deficits with gait and transfer instability    Visit Information:  Insurance: Payor: Citlali Sergey / Plan: MEDICARE PART A AND B / Product Type: *No Product type* /   PT Visit Information  Onset Date: 23  PT Insurance Information: Medicare  Total # of Visits Approved: 99  Total # of Visits to Date:   Plan of Care/Certification Expiration Date: 10/04/23  No Show: 0  Progress Note Due Date: 10/04/23  Canceled Appointment: 0  Progress Note Counter:     Subjective Information:  Subjective: Pt reports pain during last treatment. Notes mostly achiness and soreness. Otherwise pt states he has been doing well without the brace.   HEP Compliance:  [x] Good [] Fair [] Poor [] Reports not doing due to:    Pain Screening  Patient Currently in Pain: Denies    Treatment:  Exercises:  Exercises  Exercise 1: recumbent bike: L4 x 6 min  Exercise 2: gastroc stretch with SB, standing: x 2 min claire  Exercise 3: standing hip ABD AROM: 3x10 claire, attempted to perform with YTB but pt felt pain in R hip  Exercise 4: seated hip ABD (green band around knees): 5\" x 30  Exercise 5: apollo leg press: 2 plates [id number]  Exercise 6: Hip bridge + GTB around knees x 20 -5\"  Exercise 7: Hooklying bent knee fallouts (GTB) x 15 claire -5 holds  Exercise 8: Step ups 8 inch with 2 UE x 10-15 claire  Exercise 10: Seated HS curl GTB x 10-15 claire  Exercise 20: HEP: Cont Current     Objective Measures:     STG 1 Current Status[de-identified] 23 Pt demoed ability to perform SLS on R & L legs > 10 seconds w/o hip pain bit did feel weakness in R hip

## 2023-09-19 ENCOUNTER — HOSPITAL ENCOUNTER (OUTPATIENT)
Dept: PHYSICAL THERAPY | Age: 78
Setting detail: THERAPIES SERIES
Discharge: HOME | End: 2023-09-19
Payer: MEDICARE

## 2023-09-19 PROCEDURE — 97110 THERAPEUTIC EXERCISES: CPT

## 2023-09-19 ASSESSMENT — PAIN DESCRIPTION - ORIENTATION: ORIENTATION: RIGHT;OUTER

## 2023-09-19 ASSESSMENT — PAIN SCALES - GENERAL: PAINLEVEL_OUTOF10: 4

## 2023-09-19 ASSESSMENT — PAIN DESCRIPTION - DESCRIPTORS: DESCRIPTORS: SORE

## 2023-09-19 ASSESSMENT — PAIN DESCRIPTION - LOCATION: LOCATION: HIP

## 2023-09-19 NOTE — PROGRESS NOTES
[address] [facility]  Outpatient Physical Therapy    Treatment Note        Date: 2023  Patient: Jocelyn House  : 1945   Confirmed: Yes  MRN: [id number]  Referring Provider: ZAID Gonzalez    Medical Diagnosis: S/P total right hip arthroplasty [Z96.641]       Treatment Diagnosis: R hip pain s/p THR, decreased gait and mobility status with deficits in R hip ROM and strength, balance deficits with gait and transfer instability    Visit Information:  Insurance: Payor: [facility] [address] / Plan: MEDICARE PART A AND B / Product Type: *No Product type* /   PT Visit Information  Onset Date: 23  PT Insurance Information: Medicare  Total # of Visits Approved: 99  Total # of Visits to Date:   Plan of Care/Certification Expiration Date: 10/04/23  No Show: 0  Progress Note Due Date: 10/04/23  Canceled Appointment: 0  Progress Note Counter:     Subjective Information:  Subjective: Pt reports on Friday he was attempting to get in his car and he suddenly felt a sharp pain in his outter R hip that \"felt like someone stabbed him with a pitchfork\". Pt states the pain is gradually getting better.   HEP Compliance:  [x] Good [] Fair [] Poor [] Reports not doing due to:    Pain Screening  Patient Currently in Pain: Yes  Pain Assessment: 0-10  Pain Level: 4  Pain Location: Hip  Pain Orientation: Right, Outer  Pain Descriptors: Sore    Treatment:  Exercises:  Exercises  Exercise 1: recumbent bike: L3 x 6 min  Exercise 2: standing, alternating hip ABD kicks: 2x10 claire  Exercise 3: standing, alternating hip flex/ext swings: x10 claire; SLS with contraleral hip pendulums flex/ext x 10 claire  Exercise 4: apollo leg press: double leg, 1 plate x 10, 2 plates x 20  Exercise 5: apollo leg press: single le plate, 3x5 claire  Exercise 6: lateral david step over: 6\" x 10 claire, 12\" x 10 claire     *Indicates exercise, modality, or manual techniques to be initiated when appropriate    Objective Measures:   R anterior

## 2023-09-22 ENCOUNTER — HOSPITAL ENCOUNTER (OUTPATIENT)
Dept: PHYSICAL THERAPY | Age: 78
Setting detail: THERAPIES SERIES
Discharge: HOME | End: 2023-09-22
Payer: MEDICARE

## 2023-09-22 PROCEDURE — 97110 THERAPEUTIC EXERCISES: CPT

## 2023-09-22 NOTE — PROGRESS NOTES
distances independently with LRAD/no AD to demo return to PLOF. Met   LTG 4 Pt will be independent with HEP to continue exercises upon therapy discharge. In progress          Plan:  Frequency/Duration:  Plan  Plan Frequency: 1-2  Plan weeks: 6-8  Specific Instructions for Next Treatment: Discuss HEP with pt as they would like to trial HEP for a month without visits before D/C. Current Treatment Recommendations: Strengthening, ROM, Balance training, Functional mobility training, Transfer training, Endurance training, Gait training, Stair training, Neuromuscular re-education, Manual, Pain management, Home exercise program, Safety education & training, Patient/Caregiver education & training, Equipment evaluation, education, & procurement, Modalities, Therapeutic activities  Modalities: Heat/Cold  Pt to continue current HEP. See objective section for any therapeutic exercise changes, additions or modifications this date.     Therapy Time:      PT Individual Minutes  Time In: 9456  Time Out: 3431  Minutes: 41  Timed Code Treatment Minutes: 41 Minutes  Procedure Minutes: 0  Timed Activity Minutes Units   Ther Ex 39 3   Manual (obj measures) 2 0     Electronically signed by Radha Carter PTA on 9/22/23 at 11:07 AM EDT

## 2023-09-26 ENCOUNTER — HOSPITAL ENCOUNTER (OUTPATIENT)
Dept: PHYSICAL THERAPY | Age: 78
Setting detail: THERAPIES SERIES
Discharge: HOME | End: 2023-09-26
Payer: MEDICARE

## 2023-09-26 PROCEDURE — 97140 MANUAL THERAPY 1/> REGIONS: CPT

## 2023-09-26 NOTE — PROGRESS NOTES
[facility]  PHYSICAL THERAPY PLAN OF CARE   [address] [facility] [place], [address] [facility]         Phone: [phone]  Fax: [phone]    [] Certification  [] Recertification []  Plan of Care  [] Progress Note [x] Discharge      Referring Provider: ZAID Gamez     From:  Carey Nelson, PT, DPT  Patient: Lanre Bustamante (41 y.o. male) : 1945 Date: 2023  Medical Diagnosis: S/P total right hip arthroplasty [Z96.641]       Treatment Diagnosis: R hip pain s/p THR, decreased gait and mobility status with deficits in R hip ROM and strength, balance deficits with gait and transfer instability    Plan of Care/Certification Expiration Date: 10/04/23   Progress Report Period from:  2023  to 2023    Visits to Date: 16 No Show: 0 Cancelled Appts: 0    OBJECTIVE:   Short Term Goals - Time Frame for Short Term Goals: 4-6 weeks    Goals Current/Discharge status  Status   Short Term Goal 1: Pt will demo ability to perform SLS on R & L legs > 10 seconds w/o hip pain or instability  STG 1 Current Status[de-identified] 23 Pt demoed ability to perform SLS on R & L legs > 10 seconds w/o hip pain bit did feel weakness in R hip with single UE support   Met   Short Term Goal 2: Pt will be ambulatory clinical distances 50-100ft w/o AD and good functional stability. STG 2 Current Status[de-identified] 23: Self reports ability to walk over a half mile without AD. Met     Long Term Goals - Time Frame for Long Term Goals : 6-8 weeks  Goals Current/ Discharge status Status   Long Term Goal 1: Pt will demo R hip AROM WFL to independently position and perform bed mobility, transfers, floor transfers, and gait w/o pain. LTG 1 Current Status[de-identified] 23: Demonstrates good ability and control when performing STS transfers without UE and bed mobility in clinic. Admits no longer having difficulties with exiting riding lawnmower or entering and exiting bed.    Met   Long Term Goal 2: Pt will demo 5/5 R hip & claire LE
Status[de-identified] 9/22/23: Pt displayed no significant change in BLE strength. Pt able to negotiate a flight of 4 steps 3 times up and down with reciprocal gait with no increases in pain. LTG 3 Current Status[de-identified] 8/31/23: Pt reports being able to walk unrestricted distances without AD. LTG 4 Current Status[de-identified] 9/26/23: Pt reports good compliance with HEP. Assessment: Body Structures, Functions, Activity Limitations Requiring Skilled Therapeutic Intervention: Increased pain, Decreased strength, Decreased functional mobility , Decreased balance, Decreased high-level IADLs  Assessment: Reviewed HEP with patient with patient verbalizing good understanding. Pt has no increases in pain with ambulation or with SLS. Pt requests D/C on this date. Treatment Diagnosis: R hip pain s/p THR, decreased gait and mobility status with deficits in R hip ROM and strength, balance deficits with gait and transfer instability  Therapy Prognosis: Good       Patient Education: [] NA   Review of HEP    Post-Pain Assessment:       Pain Rating (0-10 pain scale):   same/10   Location and pain description same as pre-treatment unless indicated. Action: [] NA   [x] Perform HEP  [] Meds as prescribed  [] Modalities as prescribed   [] Call Physician     GOALS   Patient Goal(s): Patient Goals : get back to gardening and PLOF    Short Term Goals Completed by 4-6 weeks Goal Status   STG 1 Pt will demo ability to perform SLS on R & L legs > 10 seconds w/o hip pain or instability Met   STG 2 Pt will be ambulatory clinical distances 50-100ft w/o AD and good functional stability. Met     Long Term Goals Completed by 6-8 weeks Goal Status   LTG 1 Pt will demo R hip AROM WFL to independently position and perform bed mobility, transfers, floor transfers, and gait w/o pain. Met   LTG 2 Pt will demo 5/5 R hip & claire LE strength with ability to reciprocally ambulate steps and perform floor transfers at an independent level.  Met   LTG 3 Pt will be ambulatory

## 2023-10-05 ENCOUNTER — OFFICE VISIT (OUTPATIENT)
Dept: ORTHOPEDIC SURGERY | Age: 78
End: 2023-10-05

## 2023-10-05 VITALS
HEIGHT: 68 IN | HEART RATE: 70 BPM | TEMPERATURE: 97.8 F | BODY MASS INDEX: 32.74 KG/M2 | WEIGHT: 216 LBS | OXYGEN SATURATION: 96 %

## 2023-10-05 DIAGNOSIS — Z96.641 PRESENCE OF ARTIFICIAL HIP, RIGHT: Primary | ICD-10-CM

## 2023-10-05 PROCEDURE — 99024 POSTOP FOLLOW-UP VISIT: CPT | Performed by: ORTHOPAEDIC SURGERY

## 2023-10-11 NOTE — PROGRESS NOTES
HIP  ==================================  right POST-OPERATIVE HIP    SKIN: Appropriate post-operative appearance, No evidence of erythema, warmth, discharge or drainage, and Incision clean, dry and intact     Range of Motion: pain free    Neurovascular Status: Sensation Intact, Moves foot and ankle up & down, Moves toes up and down, and 2+ dorsalis pedis      IMAGING:  None        Provider: Lida Dunlap MD

## 2023-12-19 DIAGNOSIS — N40.1 BPH WITH OBSTRUCTION/LOWER URINARY TRACT SYMPTOMS: ICD-10-CM

## 2023-12-19 DIAGNOSIS — N13.8 BPH WITH OBSTRUCTION/LOWER URINARY TRACT SYMPTOMS: ICD-10-CM

## 2023-12-19 LAB — PSA SERPL-MCNC: 1.32 NG/ML (ref 0–4)

## 2024-01-03 DIAGNOSIS — Z96.641 PRESENCE OF ARTIFICIAL HIP, RIGHT: Primary | ICD-10-CM

## 2024-01-03 NOTE — PROGRESS NOTES
Ortho Hip Follow Up Note    Narrative Referring Provider:   Hiren Feng Jr, M.D.    PCP:   Nandini Arguello APRN - CNP    =============================================  IMPRESSION/PLAN:  Impressions indicate:   =============================================  Flex Allen is s/p Right total hip replacement completed on July 17 2023.     IMPRESSION:  No complaints or limitations.    PLAN:    No new treatment indicated.    Patient Reassurance:   Progress appears to be within the normal speed of recovery      Follow up:   July 2024  X-rays needed:  yes    Past Medical History:   Diagnosis Date    BPH (benign prostatic hypertrophy)     CAD (coronary artery disease)     Cerebrovascular disease     Chronic otitis externa 2/11/2016    Chronic suppurative otitis media 11/11/2015    Ear injury 2006    [facility] [employer]    ED (erectile dysfunction) 11/28/2014    Hearing loss     History of nephrolithiasis     History of pneumonia 2004    History of prediabetes     Hyperlipidemia 10/10/2013    Hypertension     Hypogonadism in male     Mass of chest wall, left     Optic neuropathy, ischemic     Osteoarthritis of both hips     Paresthesias     Perforation of right tympanic membrane     chronic; Dr. Neff    Screening PSA (prostate specific antigen) 2007- 1.12    Seborrheic keratosis 10/10/2013    Sensorineural hearing loss, bilateral     Stable angina     Trigger thumb, left thumb        HPI:  Flex Allen presents today for:  an intermediate post-operative visit    Status post:  Right total hip replacement    Post-operative course was complicated by:  uneventful/none    Patient rates their condition as improving.    Does the patient still experience pain?  None      Post Op discharge patient location: in home   Functional Assessment is as follows: Therapy completed  Functional difficulties: none  Pain Medication: none    ==================================  EXAM: POST OP HIP  ==================================  right

## 2024-01-04 ENCOUNTER — OFFICE VISIT (OUTPATIENT)
Dept: ORTHOPEDIC SURGERY | Age: 79
End: 2024-01-04
Payer: MEDICARE

## 2024-01-04 VITALS
HEIGHT: 69 IN | OXYGEN SATURATION: 96 % | HEART RATE: 69 BPM | BODY MASS INDEX: 33.33 KG/M2 | TEMPERATURE: 98.4 F | WEIGHT: 225 LBS

## 2024-01-04 DIAGNOSIS — Z96.641 PRESENCE OF ARTIFICIAL HIP, RIGHT: Primary | ICD-10-CM

## 2024-01-04 PROCEDURE — G8484 FLU IMMUNIZE NO ADMIN: HCPCS | Performed by: ORTHOPAEDIC SURGERY

## 2024-01-04 PROCEDURE — 1123F ACP DISCUSS/DSCN MKR DOCD: CPT | Performed by: ORTHOPAEDIC SURGERY

## 2024-01-04 PROCEDURE — 99213 OFFICE O/P EST LOW 20 MIN: CPT | Performed by: ORTHOPAEDIC SURGERY

## 2024-01-04 PROCEDURE — 1036F TOBACCO NON-USER: CPT | Performed by: ORTHOPAEDIC SURGERY

## 2024-01-04 PROCEDURE — G8417 CALC BMI ABV UP PARAM F/U: HCPCS | Performed by: ORTHOPAEDIC SURGERY

## 2024-01-04 PROCEDURE — G8427 DOCREV CUR MEDS BY ELIG CLIN: HCPCS | Performed by: ORTHOPAEDIC SURGERY

## 2024-02-07 ENCOUNTER — OFFICE VISIT (OUTPATIENT)
Dept: FAMILY MEDICINE CLINIC | Age: 79
End: 2024-02-07

## 2024-02-07 VITALS
DIASTOLIC BLOOD PRESSURE: 72 MMHG | WEIGHT: 238 LBS | OXYGEN SATURATION: 96 % | HEART RATE: 81 BPM | SYSTOLIC BLOOD PRESSURE: 120 MMHG | BODY MASS INDEX: 35.25 KG/M2 | HEIGHT: 69 IN

## 2024-02-07 DIAGNOSIS — R41.3 MEMORY CHANGES: ICD-10-CM

## 2024-02-07 DIAGNOSIS — R73.9 HYPERGLYCEMIA: ICD-10-CM

## 2024-02-07 DIAGNOSIS — M19.072 PRIMARY OSTEOARTHRITIS OF LEFT FOOT: ICD-10-CM

## 2024-02-07 DIAGNOSIS — Z00.00 MEDICARE ANNUAL WELLNESS VISIT, SUBSEQUENT: Primary | ICD-10-CM

## 2024-02-07 DIAGNOSIS — E66.01 SEVERE OBESITY (BMI 35.0-39.9) WITH COMORBIDITY (HCC): ICD-10-CM

## 2024-02-07 DIAGNOSIS — Z13.220 LIPID SCREENING: ICD-10-CM

## 2024-02-07 DIAGNOSIS — D69.6 THIN BLOOD (HCC): ICD-10-CM

## 2024-02-07 PROBLEM — Z78.9 IMPAIRED MOBILITY AND ACTIVITIES OF DAILY LIVING: Status: RESOLVED | Noted: 2023-07-18 | Resolved: 2024-02-07

## 2024-02-07 PROBLEM — Z74.09 IMPAIRED MOBILITY AND ACTIVITIES OF DAILY LIVING: Status: RESOLVED | Noted: 2023-07-18 | Resolved: 2024-02-07

## 2024-02-07 PROBLEM — G89.18 POST-OP PAIN: Status: RESOLVED | Noted: 2023-07-19 | Resolved: 2024-02-07

## 2024-02-07 PROBLEM — G93.40 ENCEPHALOPATHY ACUTE: Status: RESOLVED | Noted: 2023-07-19 | Resolved: 2024-02-07

## 2024-02-07 SDOH — ECONOMIC STABILITY: INCOME INSECURITY: HOW HARD IS IT FOR YOU TO PAY FOR THE VERY BASICS LIKE FOOD, HOUSING, MEDICAL CARE, AND HEATING?: NOT HARD AT ALL

## 2024-02-07 SDOH — ECONOMIC STABILITY: FOOD INSECURITY: WITHIN THE PAST 12 MONTHS, THE FOOD YOU BOUGHT JUST DIDN'T LAST AND YOU DIDN'T HAVE MONEY TO GET MORE.: NEVER TRUE

## 2024-02-07 SDOH — ECONOMIC STABILITY: FOOD INSECURITY: WITHIN THE PAST 12 MONTHS, YOU WORRIED THAT YOUR FOOD WOULD RUN OUT BEFORE YOU GOT MONEY TO BUY MORE.: NEVER TRUE

## 2024-02-07 ASSESSMENT — PATIENT HEALTH QUESTIONNAIRE - PHQ9
1. LITTLE INTEREST OR PLEASURE IN DOING THINGS: 0
SUM OF ALL RESPONSES TO PHQ QUESTIONS 1-9: 0
SUM OF ALL RESPONSES TO PHQ QUESTIONS 1-9: 0
2. FEELING DOWN, DEPRESSED OR HOPELESS: 0
SUM OF ALL RESPONSES TO PHQ QUESTIONS 1-9: 0
SUM OF ALL RESPONSES TO PHQ9 QUESTIONS 1 & 2: 0
SUM OF ALL RESPONSES TO PHQ QUESTIONS 1-9: 0

## 2024-02-07 ASSESSMENT — LIFESTYLE VARIABLES
HOW MANY STANDARD DRINKS CONTAINING ALCOHOL DO YOU HAVE ON A TYPICAL DAY: 1 OR 2
HOW OFTEN DO YOU HAVE A DRINK CONTAINING ALCOHOL: MONTHLY OR LESS

## 2024-02-07 NOTE — PROGRESS NOTES
Medicare Annual Wellness Visit    Flex Allen is here for Medicare AWV (Pt says he has been very forgetful lately ) and osteoarthritis (Pt left toe is lifted up and wont go down, not sure why )    Assessment & Plan   Medicare annual wellness visit, subsequent  Memory changes  -     Tae Robles MD, Neurology, [place]  Thin blood (HCC)  -     Comprehensive Metabolic Panel; Future  -     CBC; Future  Severe obesity (BMI 35.0-39.9) with comorbidity (HCC)  - pt declines intervention. Discussed importance of activity and trying to stay active.  Hyperglycemia  -     Hemoglobin A1C; Future  Lipid screening  -     Lipid, Fasting; Future  Osteoarthritis of foot- let me know if it becomes painful. Will refer to podiatry if needed.    Recommendations for Preventive Services Due: see orders and patient instructions/AVS.  Recommended screening schedule for the next 5-10 years is provided to the patient in written form: see Patient Instructions/AVS.     No follow-ups on file.     Subjective   The following acute and/or chronic problems were also addressed today:    Memory- pt is concerned bc he has noticed that he has been repeating himself more often recently. Has also had to think more seriously when he is driving about where he has to go.     Second toe on left foot has also started to drift. Asking if that is \"ok\" or tho. Not having any pain currently.    Patient's complete Health Risk Assessment and screening values have been reviewed and are found in Flowsheets. The following problems were reviewed today and where indicated follow up appointments were made and/or referrals ordered.    Positive Risk Factor Screenings with Interventions:    Fall Risk:  Do you feel unsteady or are you worried about falling? : (!) yes  2 or more falls in past year?: no  Fall with injury in past year?: no     Interventions:    Reviewed medications, home hazards, visual acuity, and co-morbidities that can increase risk for falls

## 2024-02-08 DIAGNOSIS — Z13.220 LIPID SCREENING: ICD-10-CM

## 2024-02-08 DIAGNOSIS — R73.9 HYPERGLYCEMIA: ICD-10-CM

## 2024-02-08 DIAGNOSIS — D69.6 THIN BLOOD (HCC): ICD-10-CM

## 2024-02-08 LAB
ALBUMIN SERPL-MCNC: 4 G/DL (ref 3.5–4.6)
ALP SERPL-CCNC: 70 U/L (ref 35–104)
ALT SERPL-CCNC: 18 U/L (ref 0–41)
ANION GAP SERPL CALCULATED.3IONS-SCNC: 12 MEQ/L (ref 9–15)
AST SERPL-CCNC: 24 U/L (ref 0–40)
BILIRUB SERPL-MCNC: 0.7 MG/DL (ref 0.2–0.7)
BUN SERPL-MCNC: 16 MG/DL (ref 8–23)
CALCIUM SERPL-MCNC: 9.2 MG/DL (ref 8.5–9.9)
CHLORIDE SERPL-SCNC: 104 MEQ/L (ref 95–107)
CHOLEST SERPL-MCNC: 143 MG/DL (ref 0–199)
CO2 SERPL-SCNC: 23 MEQ/L (ref 20–31)
CREAT SERPL-MCNC: 0.89 MG/DL (ref 0.7–1.2)
ERYTHROCYTE [DISTWIDTH] IN BLOOD BY AUTOMATED COUNT: 12.2 % (ref 11.5–14.5)
GLOBULIN SER CALC-MCNC: 2.6 G/DL (ref 2.3–3.5)
GLUCOSE SERPL-MCNC: 80 MG/DL (ref 70–99)
HBA1C MFR BLD: 5.7 % (ref 4.8–5.9)
HCT VFR BLD AUTO: 49 % (ref 42–52)
HDLC SERPL-MCNC: 37 MG/DL (ref 40–59)
HGB BLD-MCNC: 16.2 G/DL (ref 14–18)
LDL CHOLESTEROL CALCULATED: 74 MG/DL (ref 0–129)
MCH RBC QN AUTO: 29.8 PG (ref 27–31.3)
MCHC RBC AUTO-ENTMCNC: 33.1 % (ref 33–37)
MCV RBC AUTO: 90.2 FL (ref 79–92.2)
PLATELET # BLD AUTO: 238 K/UL (ref 130–400)
POTASSIUM SERPL-SCNC: 4.6 MEQ/L (ref 3.4–4.9)
PROT SERPL-MCNC: 6.6 G/DL (ref 6.3–8)
RBC # BLD AUTO: 5.43 M/UL (ref 4.7–6.1)
SODIUM SERPL-SCNC: 139 MEQ/L (ref 135–144)
TRIGLYCERIDE, FASTING: 158 MG/DL (ref 0–150)
WBC # BLD AUTO: 8 K/UL (ref 4.8–10.8)

## 2024-02-09 DIAGNOSIS — I10 ESSENTIAL HYPERTENSION: ICD-10-CM

## 2024-02-11 RX ORDER — METOPROLOL SUCCINATE 25 MG/1
25 TABLET, EXTENDED RELEASE ORAL NIGHTLY
Qty: 90 TABLET | Refills: 3 | Status: SHIPPED | OUTPATIENT
Start: 2024-02-11

## 2024-02-11 RX ORDER — IRBESARTAN 150 MG/1
TABLET ORAL
Qty: 90 TABLET | Refills: 3 | Status: SHIPPED | OUTPATIENT
Start: 2024-02-11

## 2024-02-11 RX ORDER — SIMVASTATIN 20 MG
TABLET ORAL
Qty: 90 TABLET | Refills: 3 | Status: SHIPPED | OUTPATIENT
Start: 2024-02-11

## 2024-03-12 ENCOUNTER — OFFICE VISIT (OUTPATIENT)
Dept: FAMILY MEDICINE CLINIC | Age: 79
End: 2024-03-12

## 2024-03-12 VITALS
WEIGHT: 236.8 LBS | HEIGHT: 69 IN | SYSTOLIC BLOOD PRESSURE: 124 MMHG | HEART RATE: 74 BPM | BODY MASS INDEX: 35.07 KG/M2 | DIASTOLIC BLOOD PRESSURE: 60 MMHG | OXYGEN SATURATION: 94 %

## 2024-03-12 DIAGNOSIS — E66.01 SEVERE OBESITY (BMI 35.0-39.9) WITH COMORBIDITY (HCC): Primary | ICD-10-CM

## 2024-03-12 DIAGNOSIS — E78.2 MIXED HYPERLIPIDEMIA: ICD-10-CM

## 2024-03-12 DIAGNOSIS — I10 PRIMARY HYPERTENSION: ICD-10-CM

## 2024-03-12 DIAGNOSIS — R73.03 PREDIABETES: ICD-10-CM

## 2024-03-12 DIAGNOSIS — I25.10 CORONARY ARTERY DISEASE INVOLVING NATIVE CORONARY ARTERY OF NATIVE HEART WITHOUT ANGINA PECTORIS: ICD-10-CM

## 2024-03-12 RX ORDER — TIRZEPATIDE 2.5 MG/.5ML
2.5 INJECTION, SOLUTION SUBCUTANEOUS WEEKLY
Qty: 4 EACH | Refills: 0 | Status: SHIPPED | OUTPATIENT
Start: 2024-03-12

## 2024-03-12 ASSESSMENT — ENCOUNTER SYMPTOMS
COUGH: 0
SHORTNESS OF BREATH: 0
DIARRHEA: 0
CONSTIPATION: 0

## 2024-03-12 NOTE — PROGRESS NOTES
Subjective  Chief Complaint   Patient presents with    Weight Management     Exercise and diet is not quite working        HPI    Pt is here because he would like help losing weight.    He has been trying to eat less over the past month. Hasn't noticed much of a change.    Diet:_ example for yesterday- had salmon nat burger, chili, orange. Egg salad for bfast.   Does eat veggies.     Physical activity- goes to son's wood shop once a week.   Likes to walk for physical activity. Has not been able to over the winter.    Asking about prescription options to help with weight loss.    Patient Active Problem List    Diagnosis Date Noted    Thin blood ([facility]) 02/07/2024    Nocturnal hypoxia 07/20/2023    Sleep apnea 07/20/2023    Gluteal tendinitis of right buttock 07/18/2023    Status post total hip replacement, right 07/17/2023    Morbidly obese ([facility]) 07/28/2020    Perforation of right tympanic membrane     Trigger thumb, left thumb     Hypogonadism in male     Chronic otitis externa 02/11/2016    CAD (coronary artery disease)     Seborrheic keratoses     Sensorineural hearing loss, bilateral     Chronic suppurative otitis media 11/11/2015    ED (erectile dysfunction) 11/28/2014    Hyperlipidemia 10/10/2013    Hypertension     Benign prostatic hyperplasia with incomplete bladder emptying     Cerebrovascular disease      Past Medical History:   Diagnosis Date    BPH (benign prostatic hypertrophy)     CAD (coronary artery disease)     Cerebrovascular disease     Chronic otitis externa 2/11/2016    Chronic suppurative otitis media 11/11/2015    Ear injury 2006    SRAVAN FROM [place]    ED (erectile dysfunction) 11/28/2014    Hearing loss     History of nephrolithiasis     History of pneumonia 2004    History of prediabetes     Hyperlipidemia 10/10/2013    Hypertension     Hypogonadism in male     Mass of chest wall, left     Optic neuropathy, ischemic     Osteoarthritis of both hips     Paresthesias     Perforation of

## 2024-04-26 ENCOUNTER — OFFICE VISIT (OUTPATIENT)
Dept: CARDIOLOGY CLINIC | Age: 79
End: 2024-04-26

## 2024-04-26 VITALS
BODY MASS INDEX: 33.71 KG/M2 | DIASTOLIC BLOOD PRESSURE: 68 MMHG | WEIGHT: 225 LBS | SYSTOLIC BLOOD PRESSURE: 124 MMHG | HEART RATE: 78 BPM

## 2024-04-26 DIAGNOSIS — I10 PRIMARY HYPERTENSION: ICD-10-CM

## 2024-04-26 DIAGNOSIS — I25.10 CORONARY ARTERY DISEASE INVOLVING NATIVE CORONARY ARTERY OF NATIVE HEART WITHOUT ANGINA PECTORIS: Primary | ICD-10-CM

## 2024-04-26 DIAGNOSIS — E78.2 MIXED HYPERLIPIDEMIA: ICD-10-CM

## 2024-04-26 DIAGNOSIS — I10 ESSENTIAL HYPERTENSION: ICD-10-CM

## 2024-04-26 DIAGNOSIS — I10 BENIGN ESSENTIAL HYPERTENSION: ICD-10-CM

## 2024-04-26 DIAGNOSIS — E66.01 MORBIDLY OBESE (HCC): ICD-10-CM

## 2024-04-26 NOTE — PROGRESS NOTES
for Sleep 90 tablet 1    tamsulosin (FLOMAX) 0.4 MG capsule Take 1 capsule by mouth daily 90 capsule 3    Naphazoline-Glycerin (CLEAR EYES COOLING COMFORT OP) Apply to eye      Ascorbic Acid (VITAMIN C) 500 MG tablet Take 1 tablet by mouth daily      Multiple Vitamins-Minerals (MULTIVITAMIN PO) Take by mouth      aspirin 81 MG EC tablet Take 1 tablet by mouth 2 times daily 60 tablet 0     No current facility-administered medications for this visit.       Patient has no known allergies.    Review of Systems:  General ROS: negative  Psychological ROS: negative  Hematological and Lymphatic ROS: No history of blood clots or bleeding disorder.   Respiratory ROS: no cough, shortness of breath, or wheezing  Cardiovascular ROS: negative  Gastrointestinal ROS: negative  Genito-Urinary ROS: no dysuria, trouble voiding, or hematuria  Musculoskeletal ROS: negative  Neurological ROS: no TIA or stroke symptoms  Dermatological ROS: negative    VITALS:  Blood pressure 124/68, pulse 78, weight 102.1 kg (225 lb).  Body mass index is 33.71 kg/m².    Physical Examination:  General appearance - alert, well appearing, and in no distress  Mental status - alert, oriented to person, place, and time  Neck - Neck is supple, no JVD or carotid bruits.  No thyromegaly or adenopathy.   Chest - clear to auscultation, no wheezes, rales or rhonchi, symmetric air entry  Heart - normal rate, regular rhythm, normal S1, S2, no murmurs, rubs, clicks or gallops  Abdomen - soft, nontender, nondistended, no masses or organomegaly  Neurological - alert, oriented, normal speech, no focal findings or movement disorder noted  Extremities - peripheral pulses normal, no pedal edema, no clubbing or cyanosis  Skin - normal coloration and turgor, no rashes, no suspicious skin lesions noted      EKG:     Orders Placed This Encounter   Procedures    EKG 12 Lead       ASSESSMENT:     Diagnosis Orders   1. Coronary artery disease involving native coronary artery of

## 2024-04-29 ENCOUNTER — TELEPHONE (OUTPATIENT)
Dept: FAMILY MEDICINE CLINIC | Age: 79
End: 2024-04-29

## 2024-07-05 ENCOUNTER — OFFICE VISIT (OUTPATIENT)
Dept: ORTHOPEDIC SURGERY | Age: 79
End: 2024-07-05
Payer: MEDICARE

## 2024-07-05 DIAGNOSIS — Z96.641 PRESENCE OF ARTIFICIAL HIP, RIGHT: Primary | ICD-10-CM

## 2024-07-05 PROCEDURE — 1123F ACP DISCUSS/DSCN MKR DOCD: CPT | Performed by: ORTHOPAEDIC SURGERY

## 2024-07-05 PROCEDURE — 99214 OFFICE O/P EST MOD 30 MIN: CPT | Performed by: ORTHOPAEDIC SURGERY

## 2024-07-05 PROCEDURE — G8428 CUR MEDS NOT DOCUMENT: HCPCS | Performed by: ORTHOPAEDIC SURGERY

## 2024-07-05 PROCEDURE — 1036F TOBACCO NON-USER: CPT | Performed by: ORTHOPAEDIC SURGERY

## 2024-07-05 PROCEDURE — G8417 CALC BMI ABV UP PARAM F/U: HCPCS | Performed by: ORTHOPAEDIC SURGERY

## 2024-07-05 NOTE — PROGRESS NOTES
Patient ID:  Flex Allen is a 79 y.o. male who presents today for evaluation of right hip pain.  He had a total hip done in the past on the right side.  Catch in hip 6 times two weeks ago while doing wood work.  No pain now.    Injury: no    Location of pain: He is not having any pain at the present time  Pain: No pain present  Onset:  It was sudden but it is gone  Duration: 1 days  Frequency:   It is gone  Quality: The pain was sharp but it is gone now   Swelling: patient does not note any swelling of the joint  Aggravating factors:  None  Alleviating factors:  None  Mechanical symptoms:  She had popping but it is gone  Radiation: no    Activities: walking independently  Restriction:  none  Progression:  improving    Previous treatment:  none  NSAIDs:  none  PT:  none    Medications:    Current Outpatient Medications on File Prior to Visit   Medication Sig Dispense Refill    Tirzepatide (MOUNJARO) 2.5 MG/0.5ML SOPN SC injection Inject 0.5 mLs into the skin once a week 4 each 0    irbesartan (AVAPRO) 150 MG tablet TAKE 1 TABLET BY MOUTH DAILY 90 tablet 3    simvastatin (ZOCOR) 20 MG tablet TAKE 1 TABLET BY MOUTH AT NIGHT 90 tablet 3    metoprolol succinate (TOPROL XL) 25 MG extended release tablet TAKE 1 TABLET BY MOUTH AT NIGHT 90 tablet 3    coenzyme Q10 100 MG CAPS capsule Take 1 capsule by mouth daily 60 capsule 0    Vitamin D (CHOLECALCIFEROL) 50 MCG (2000 UT) TABS tablet Take 1 tablet by mouth Daily with supper 60 tablet 0    lidocaine 4 % external patch Place 3 patches onto the skin daily as needed (pain) 15 patch 0    menthol (GOLD BOND) 0.15 % POWD powder Apply topically 2 times daily 283 g 0    Misc. Devices (RAISED TOILET SEAT) MISC Dispense 1 raised toilet seat 1 each 0    finasteride (PROSCAR) 5 MG tablet Take 1 tablet by mouth daily 90 tablet 3    Handicap Placard MISC by Does not apply route Good for 5 years 1 each 0    traZODone (DESYREL) 100 MG tablet Take 1 tablet by mouth nightly as needed for

## 2024-07-19 PROBLEM — M54.59 OTHER LOW BACK PAIN: Status: ACTIVE | Noted: 2024-07-19

## 2024-07-25 ENCOUNTER — OFFICE VISIT (OUTPATIENT)
Dept: NEUROLOGY | Age: 79
End: 2024-07-25
Payer: MEDICARE

## 2024-07-25 VITALS
DIASTOLIC BLOOD PRESSURE: 70 MMHG | BODY MASS INDEX: 34.61 KG/M2 | HEART RATE: 79 BPM | WEIGHT: 231 LBS | SYSTOLIC BLOOD PRESSURE: 120 MMHG

## 2024-07-25 DIAGNOSIS — R41.3 MEMORY LOSS: Primary | ICD-10-CM

## 2024-07-25 DIAGNOSIS — G31.84 MCI (MILD COGNITIVE IMPAIRMENT): ICD-10-CM

## 2024-07-25 PROCEDURE — 3074F SYST BP LT 130 MM HG: CPT | Performed by: PSYCHIATRY & NEUROLOGY

## 2024-07-25 PROCEDURE — 1036F TOBACCO NON-USER: CPT | Performed by: PSYCHIATRY & NEUROLOGY

## 2024-07-25 PROCEDURE — G8427 DOCREV CUR MEDS BY ELIG CLIN: HCPCS | Performed by: PSYCHIATRY & NEUROLOGY

## 2024-07-25 PROCEDURE — 99204 OFFICE O/P NEW MOD 45 MIN: CPT | Performed by: PSYCHIATRY & NEUROLOGY

## 2024-07-25 PROCEDURE — 1123F ACP DISCUSS/DSCN MKR DOCD: CPT | Performed by: PSYCHIATRY & NEUROLOGY

## 2024-07-25 PROCEDURE — G8417 CALC BMI ABV UP PARAM F/U: HCPCS | Performed by: PSYCHIATRY & NEUROLOGY

## 2024-07-25 PROCEDURE — 3078F DIAST BP <80 MM HG: CPT | Performed by: PSYCHIATRY & NEUROLOGY

## 2024-07-25 RX ORDER — AMOXICILLIN 500 MG/1
CAPSULE ORAL
COMMUNITY
Start: 2024-07-11

## 2024-07-25 NOTE — PROGRESS NOTES
menthol (GOLD BOND) 0.15 % POWD powder Apply topically 2 times daily 283 g 0    Misc. Devices (RAISED TOILET SEAT) MISC Dispense 1 raised toilet seat 1 each 0    finasteride (PROSCAR) 5 MG tablet Take 1 tablet by mouth daily 90 tablet 3    Handicap Placard MISC by Does not apply route Good for 5 years 1 each 0    traZODone (DESYREL) 100 MG tablet Take 1 tablet by mouth nightly as needed for Sleep 90 tablet 1    tamsulosin (FLOMAX) 0.4 MG capsule Take 1 capsule by mouth daily 90 capsule 3    Naphazoline-Glycerin (CLEAR EYES COOLING COMFORT OP) Apply to eye      Ascorbic Acid (VITAMIN C) 500 MG tablet Take 1 tablet by mouth daily      Multiple Vitamins-Minerals (MULTIVITAMIN PO) Take by mouth      aspirin 81 MG EC tablet Take 1 tablet by mouth 2 times daily 60 tablet 0     No current facility-administered medications for this visit.         Review of Systems   Constitutional:  Negative for fever.   HENT:  Negative for ear pain, tinnitus and trouble swallowing.    Eyes:  Negative for photophobia and visual disturbance.   Respiratory:  Negative for choking and shortness of breath.    Cardiovascular:  Negative for chest pain and palpitations.   Gastrointestinal:  Negative for nausea and vomiting.   Genitourinary:  Positive for urgency.   Musculoskeletal:  Negative for back pain, gait problem, joint swelling, myalgias, neck pain and neck stiffness.   Skin:  Negative for color change.   Allergic/Immunologic: Negative for food allergies.   Neurological:  Negative for dizziness, tremors, seizures, syncope, facial asymmetry, speech difficulty, weakness, light-headedness, numbness and headaches.   Psychiatric/Behavioral:  Negative for behavioral problems, confusion, hallucinations and sleep disturbance.        Objective:   /70 (Site: Left Upper Arm, Position: Sitting, Cuff Size: Medium Adult)   Pulse 79   Wt 104.8 kg (231 lb)   BMI 34.61 kg/m²     Physical Exam  Vitals reviewed.   Eyes:      Pupils: Pupils are equal,

## 2024-08-20 ENCOUNTER — OFFICE VISIT (OUTPATIENT)
Dept: FAMILY MEDICINE CLINIC | Age: 79
End: 2024-08-20
Payer: MEDICARE

## 2024-08-20 VITALS
SYSTOLIC BLOOD PRESSURE: 122 MMHG | WEIGHT: 228 LBS | HEIGHT: 69 IN | BODY MASS INDEX: 33.77 KG/M2 | OXYGEN SATURATION: 95 % | DIASTOLIC BLOOD PRESSURE: 86 MMHG | TEMPERATURE: 99.2 F | HEART RATE: 90 BPM

## 2024-08-20 DIAGNOSIS — I10 PRIMARY HYPERTENSION: Primary | ICD-10-CM

## 2024-08-20 DIAGNOSIS — E78.2 MIXED HYPERLIPIDEMIA: ICD-10-CM

## 2024-08-20 DIAGNOSIS — R73.03 PREDIABETES: ICD-10-CM

## 2024-08-20 PROCEDURE — 1036F TOBACCO NON-USER: CPT | Performed by: NURSE PRACTITIONER

## 2024-08-20 PROCEDURE — 3079F DIAST BP 80-89 MM HG: CPT | Performed by: NURSE PRACTITIONER

## 2024-08-20 PROCEDURE — 3074F SYST BP LT 130 MM HG: CPT | Performed by: NURSE PRACTITIONER

## 2024-08-20 PROCEDURE — G8417 CALC BMI ABV UP PARAM F/U: HCPCS | Performed by: NURSE PRACTITIONER

## 2024-08-20 PROCEDURE — G8427 DOCREV CUR MEDS BY ELIG CLIN: HCPCS | Performed by: NURSE PRACTITIONER

## 2024-08-20 PROCEDURE — 99213 OFFICE O/P EST LOW 20 MIN: CPT | Performed by: NURSE PRACTITIONER

## 2024-08-20 PROCEDURE — 1123F ACP DISCUSS/DSCN MKR DOCD: CPT | Performed by: NURSE PRACTITIONER

## 2024-08-20 ASSESSMENT — ENCOUNTER SYMPTOMS
DIARRHEA: 0
CONSTIPATION: 0
COUGH: 0
SHORTNESS OF BREATH: 0

## 2024-10-29 ENCOUNTER — OFFICE VISIT (OUTPATIENT)
Dept: ORTHOPEDIC SURGERY | Age: 79
End: 2024-10-29

## 2024-10-29 VITALS — HEART RATE: 71 BPM | WEIGHT: 230 LBS | BODY MASS INDEX: 34.07 KG/M2 | HEIGHT: 69 IN | OXYGEN SATURATION: 96 %

## 2024-10-29 DIAGNOSIS — M19.012 ARTHRITIS OF LEFT GLENOHUMERAL JOINT: Primary | ICD-10-CM

## 2024-10-29 RX ORDER — METHYLPREDNISOLONE ACETATE 80 MG/ML
80 INJECTION, SUSPENSION INTRA-ARTICULAR; INTRALESIONAL; INTRAMUSCULAR; SOFT TISSUE ONCE
Status: COMPLETED | OUTPATIENT
Start: 2024-10-29 | End: 2024-10-29

## 2024-10-29 RX ORDER — LIDOCAINE HYDROCHLORIDE 10 MG/ML
5 INJECTION, SOLUTION INFILTRATION; PERINEURAL ONCE
Status: COMPLETED | OUTPATIENT
Start: 2024-10-29 | End: 2024-10-29

## 2024-10-29 RX ADMIN — LIDOCAINE HYDROCHLORIDE 5 ML: 10 INJECTION, SOLUTION INFILTRATION; PERINEURAL at 10:40

## 2024-10-29 RX ADMIN — METHYLPREDNISOLONE ACETATE 80 MG: 80 INJECTION, SUSPENSION INTRA-ARTICULAR; INTRALESIONAL; INTRAMUSCULAR; SOFT TISSUE at 10:40

## 2024-10-29 NOTE — PROGRESS NOTES
Vitamins-Minerals (MULTIVITAMIN PO) Take by mouth      aspirin 81 MG EC tablet Take 1 tablet by mouth 2 times daily 60 tablet 0     No current facility-administered medications on file prior to visit.       Review of Systems   Musculoskeletal:  Positive for arthralgias.   Skin: Negative.    Neurological: Negative.    All other systems reviewed and are negative.        Objective:   Pulse 71   Ht 1.74 m (5' 8.5\")   Wt 104.3 kg (230 lb)   SpO2 96%   BMI 34.46 kg/m²     Physical Exam:  Ortho Exam    Physical exam of the left shoulder:  Skin intact.  No redness or warmth.  No signs of infection.   There is not swelling and deformity of the shoulder girdle.   There is not a ramin sign.   There is not muscular atrophy present.   Active and passive range of motion of the shoulder is equal.    Active range of motion of the shoulder is slightly decreased. Forward flexion: 160. External rotation at side: 35. Internal rotation: Lumbar. There is not pain with end ranges of motion. There is not crepitus.   Strength: 5/5 deltoid. 5/5 biceps. 5/5 triceps. 4+/5 supraspinatus. 4+/5 infraspinatus. 5/5 teres minor. Negative Hornblower's sign. Negative external rotation lag sign. 5/5 subscapularis. Negative belly press.   Sensation intact to light touch along the C4-T1 distribution: normal.   Radial pulse is normal and symmetric.      Radiographs and Laboratory Studies:     Diagnostic Imaging Studies:    Three-view x-ray of the left shoulder dated 10/29/2024 was independently reviewed by me and demonstrates no acute fracture.  There is moderate to severe left glenohumeral joint arthritis with decreased glenohumeral joint space and bone spur formation.    Laboratory Studies:   Lab Results   Component Value Date    WBC 8.0 02/08/2024    HGB 16.2 02/08/2024    HCT 49.0 02/08/2024    MCV 90.2 02/08/2024     02/08/2024     No results found for: \"SEDRATE\"  No results found for: \"CRP\"    Lab results have been personally reviewed by

## 2024-12-18 DIAGNOSIS — I10 ESSENTIAL HYPERTENSION: ICD-10-CM

## 2024-12-19 RX ORDER — METOPROLOL SUCCINATE 25 MG/1
25 TABLET, EXTENDED RELEASE ORAL NIGHTLY
Qty: 90 TABLET | Refills: 3 | Status: SHIPPED | OUTPATIENT
Start: 2024-12-19

## 2024-12-19 RX ORDER — SIMVASTATIN 20 MG
TABLET ORAL
Qty: 90 TABLET | Refills: 3 | Status: SHIPPED | OUTPATIENT
Start: 2024-12-19

## 2024-12-19 NOTE — TELEPHONE ENCOUNTER
Requesting medication refill. Please approve or deny this request.    Rx requested:  Requested Prescriptions     Pending Prescriptions Disp Refills    metoprolol succinate (TOPROL XL) 25 MG extended release tablet [Pharmacy Med Name: Metoprolol Succinate ER 25 MG Oral Tablet Extended Release 24 Hour] 90 tablet 3     Sig: TAKE 1 TABLET BY MOUTH AT NIGHT    simvastatin (ZOCOR) 20 MG tablet [Pharmacy Med Name: Simvastatin 20 MG Oral Tablet] 90 tablet 3     Sig: TAKE 1 TABLET BY MOUTH AT NIGHT         Last Office Visit:   4/26/2024      Next Visit Date:  Future Appointments   Date Time Provider Department Center   1/9/2025  9:00 AM Shaun Heath MD [facility]   1/30/2025  1:30 PM Tae Esposito MD [place] [facility] -   2/10/2025  8:30 AM Nandini Arguello APRN - CNP [facility]   5/2/2025  9:00 AM Holiday, DO [facility]

## 2025-01-07 ENCOUNTER — TELEPHONE (OUTPATIENT)
Dept: UROLOGY | Age: 80
End: 2025-01-07

## 2025-01-07 DIAGNOSIS — N13.8 BPH WITH OBSTRUCTION/LOWER URINARY TRACT SYMPTOMS: Primary | ICD-10-CM

## 2025-01-07 DIAGNOSIS — N40.1 BPH WITH OBSTRUCTION/LOWER URINARY TRACT SYMPTOMS: Primary | ICD-10-CM

## 2025-01-07 DIAGNOSIS — N40.1 BPH WITH OBSTRUCTION/LOWER URINARY TRACT SYMPTOMS: ICD-10-CM

## 2025-01-07 DIAGNOSIS — N13.8 BPH WITH OBSTRUCTION/LOWER URINARY TRACT SYMPTOMS: ICD-10-CM

## 2025-01-07 LAB — PSA SERPL-MCNC: 3.52 NG/ML (ref 0–4)

## 2025-01-09 ENCOUNTER — OFFICE VISIT (OUTPATIENT)
Dept: UROLOGY | Age: 80
End: 2025-01-09
Payer: MEDICARE

## 2025-01-09 VITALS
WEIGHT: 230 LBS | HEART RATE: 90 BPM | DIASTOLIC BLOOD PRESSURE: 78 MMHG | HEIGHT: 69 IN | SYSTOLIC BLOOD PRESSURE: 126 MMHG | BODY MASS INDEX: 34.07 KG/M2

## 2025-01-09 DIAGNOSIS — N40.1 BPH WITH LOWER URINARY TRACT SYMPTOMS WITHOUT URINARY OBSTRUCTION: Primary | ICD-10-CM

## 2025-01-09 DIAGNOSIS — R97.20 ELEVATED PSA: ICD-10-CM

## 2025-01-09 PROCEDURE — 1123F ACP DISCUSS/DSCN MKR DOCD: CPT | Performed by: UROLOGY

## 2025-01-09 PROCEDURE — 99213 OFFICE O/P EST LOW 20 MIN: CPT | Performed by: UROLOGY

## 2025-01-09 PROCEDURE — G8427 DOCREV CUR MEDS BY ELIG CLIN: HCPCS | Performed by: UROLOGY

## 2025-01-09 PROCEDURE — 3078F DIAST BP <80 MM HG: CPT | Performed by: UROLOGY

## 2025-01-09 PROCEDURE — G8417 CALC BMI ABV UP PARAM F/U: HCPCS | Performed by: UROLOGY

## 2025-01-09 PROCEDURE — 1159F MED LIST DOCD IN RCRD: CPT | Performed by: UROLOGY

## 2025-01-09 PROCEDURE — 3074F SYST BP LT 130 MM HG: CPT | Performed by: UROLOGY

## 2025-01-09 PROCEDURE — 1036F TOBACCO NON-USER: CPT | Performed by: UROLOGY

## 2025-01-09 PROCEDURE — 1160F RVW MEDS BY RX/DR IN RCRD: CPT | Performed by: UROLOGY

## 2025-01-09 ASSESSMENT — ENCOUNTER SYMPTOMS
ABDOMINAL PAIN: 0
ABDOMINAL DISTENTION: 0

## 2025-01-09 NOTE — PROGRESS NOTES
performed  on the Denise e immunoassay analyzer. Results obtained  with different test methods or kits must not be used  interchangeably.     12/19/2023 1.32 0.00 - 4.00 ng/mL Final   12/13/2022 1.33 0.00 - 4.00 ng/mL Final   04/02/2021 1.35 0.00 - 6.22 ng/mL Final     Comment:     When the Total PSA is between 3.00 and 10.00 ng/mL, consider  requesting a Free PSA to aid in diagnosis.     12/09/2020 1.51 0.00 - 6.22 ng/mL Final       Assessment:      This is a 80 yo white male with h/o HTN, CAD, CVA (lt eye vision loss), kidney stones, BPH w/LUTs on Proscar and Flomax (stable) with stable symptoms and an unexplained rise in the interval PSA. The options of prostate MRI and possible biopsy vs just closer PSA observation were discussed and he wants the latter. He understands the approx 25% risk for prostate cancer based on corrected PSA.        Plan:      F/U 3-4 mo for PSA        Shaun Heath MD

## 2025-01-11 DIAGNOSIS — I10 ESSENTIAL HYPERTENSION: ICD-10-CM

## 2025-01-12 RX ORDER — IRBESARTAN 150 MG/1
TABLET ORAL
Qty: 90 TABLET | Refills: 3 | Status: SHIPPED | OUTPATIENT
Start: 2025-01-12

## 2025-02-10 ENCOUNTER — OFFICE VISIT (OUTPATIENT)
Dept: FAMILY MEDICINE CLINIC | Age: 80
End: 2025-02-10
Payer: MEDICARE

## 2025-02-10 VITALS
OXYGEN SATURATION: 96 % | DIASTOLIC BLOOD PRESSURE: 82 MMHG | SYSTOLIC BLOOD PRESSURE: 120 MMHG | WEIGHT: 240.2 LBS | HEART RATE: 85 BPM | TEMPERATURE: 98.4 F | BODY MASS INDEX: 35.58 KG/M2 | HEIGHT: 69 IN

## 2025-02-10 DIAGNOSIS — R97.20 ELEVATED PSA: ICD-10-CM

## 2025-02-10 DIAGNOSIS — K21.9 GASTROESOPHAGEAL REFLUX DISEASE WITHOUT ESOPHAGITIS: ICD-10-CM

## 2025-02-10 DIAGNOSIS — Z13.220 LIPID SCREENING: ICD-10-CM

## 2025-02-10 DIAGNOSIS — R53.83 FATIGUE, UNSPECIFIED TYPE: ICD-10-CM

## 2025-02-10 DIAGNOSIS — I10 PRIMARY HYPERTENSION: ICD-10-CM

## 2025-02-10 DIAGNOSIS — R73.9 HYPERGLYCEMIA: ICD-10-CM

## 2025-02-10 DIAGNOSIS — E78.2 MIXED HYPERLIPIDEMIA: ICD-10-CM

## 2025-02-10 DIAGNOSIS — R73.03 PREDIABETES: ICD-10-CM

## 2025-02-10 DIAGNOSIS — Z00.00 MEDICARE ANNUAL WELLNESS VISIT, SUBSEQUENT: Primary | ICD-10-CM

## 2025-02-10 LAB
ALBUMIN SERPL-MCNC: 3.9 G/DL (ref 3.5–4.6)
ALP SERPL-CCNC: 73 U/L (ref 35–104)
ALT SERPL-CCNC: 28 U/L (ref 0–41)
ANION GAP SERPL CALCULATED.3IONS-SCNC: 9 MEQ/L (ref 9–15)
AST SERPL-CCNC: 36 U/L (ref 0–40)
BILIRUB SERPL-MCNC: 0.8 MG/DL (ref 0.2–0.7)
BUN SERPL-MCNC: 21 MG/DL (ref 8–23)
CALCIUM SERPL-MCNC: 9.6 MG/DL (ref 8.5–9.9)
CHLORIDE SERPL-SCNC: 102 MEQ/L (ref 95–107)
CHOLEST SERPL-MCNC: 157 MG/DL (ref 0–199)
CO2 SERPL-SCNC: 27 MEQ/L (ref 20–31)
CREAT SERPL-MCNC: 0.98 MG/DL (ref 0.7–1.2)
ERYTHROCYTE [DISTWIDTH] IN BLOOD BY AUTOMATED COUNT: 11.8 % (ref 11.5–14.5)
GLOBULIN SER CALC-MCNC: 3 G/DL (ref 2.3–3.5)
GLUCOSE SERPL-MCNC: 90 MG/DL (ref 70–99)
HCT VFR BLD AUTO: 50.9 % (ref 42–52)
HDLC SERPL-MCNC: 41 MG/DL (ref 40–59)
HGB BLD-MCNC: 17.1 G/DL (ref 14–18)
LDLC SERPL CALC-MCNC: 83 MG/DL (ref 0–129)
MCH RBC QN AUTO: 30.9 PG (ref 27–31.3)
MCHC RBC AUTO-ENTMCNC: 33.6 % (ref 33–37)
MCV RBC AUTO: 91.9 FL (ref 79–92.2)
PLATELET # BLD AUTO: 205 K/UL (ref 130–400)
POTASSIUM SERPL-SCNC: 4.9 MEQ/L (ref 3.4–4.9)
PROT SERPL-MCNC: 6.9 G/DL (ref 6.3–8)
RBC # BLD AUTO: 5.54 M/UL (ref 4.7–6.1)
SODIUM SERPL-SCNC: 138 MEQ/L (ref 135–144)
TRIGL SERPL-MCNC: 165 MG/DL (ref 0–150)
WBC # BLD AUTO: 8 K/UL (ref 4.8–10.8)

## 2025-02-10 PROCEDURE — 1160F RVW MEDS BY RX/DR IN RCRD: CPT | Performed by: NURSE PRACTITIONER

## 2025-02-10 PROCEDURE — 1159F MED LIST DOCD IN RCRD: CPT | Performed by: NURSE PRACTITIONER

## 2025-02-10 PROCEDURE — G0439 PPPS, SUBSEQ VISIT: HCPCS | Performed by: NURSE PRACTITIONER

## 2025-02-10 PROCEDURE — 3079F DIAST BP 80-89 MM HG: CPT | Performed by: NURSE PRACTITIONER

## 2025-02-10 PROCEDURE — 1123F ACP DISCUSS/DSCN MKR DOCD: CPT | Performed by: NURSE PRACTITIONER

## 2025-02-10 PROCEDURE — 3074F SYST BP LT 130 MM HG: CPT | Performed by: NURSE PRACTITIONER

## 2025-02-10 PROCEDURE — 1126F AMNT PAIN NOTED NONE PRSNT: CPT | Performed by: NURSE PRACTITIONER

## 2025-02-10 RX ORDER — PANTOPRAZOLE SODIUM 20 MG/1
20 TABLET, DELAYED RELEASE ORAL
Qty: 30 TABLET | Refills: 0 | Status: SHIPPED | OUTPATIENT
Start: 2025-02-10

## 2025-02-10 SDOH — ECONOMIC STABILITY: FOOD INSECURITY: WITHIN THE PAST 12 MONTHS, THE FOOD YOU BOUGHT JUST DIDN'T LAST AND YOU DIDN'T HAVE MONEY TO GET MORE.: NEVER TRUE

## 2025-02-10 SDOH — ECONOMIC STABILITY: FOOD INSECURITY: WITHIN THE PAST 12 MONTHS, YOU WORRIED THAT YOUR FOOD WOULD RUN OUT BEFORE YOU GOT MONEY TO BUY MORE.: NEVER TRUE

## 2025-02-10 ASSESSMENT — PATIENT HEALTH QUESTIONNAIRE - PHQ9
SUM OF ALL RESPONSES TO PHQ QUESTIONS 1-9: 0
1. LITTLE INTEREST OR PLEASURE IN DOING THINGS: NOT AT ALL
SUM OF ALL RESPONSES TO PHQ QUESTIONS 1-9: 0
SUM OF ALL RESPONSES TO PHQ9 QUESTIONS 1 & 2: 0
2. FEELING DOWN, DEPRESSED OR HOPELESS: NOT AT ALL

## 2025-02-10 ASSESSMENT — LIFESTYLE VARIABLES
HOW MANY STANDARD DRINKS CONTAINING ALCOHOL DO YOU HAVE ON A TYPICAL DAY: PATIENT DOES NOT DRINK
HOW OFTEN DO YOU HAVE A DRINK CONTAINING ALCOHOL: NEVER

## 2025-02-10 NOTE — PROGRESS NOTES
Medicare Annual Wellness Visit    Flex Allen is here for Medicare AWV (Does have a concern), Flank Pain (LT side pain in rib cage area, x 3/4 months), and Blood Work (Labs, Pended)    Assessment & Plan  1. Abdominal discomfort.  The symptoms suggest a potential gastric irritation or the onset of an ulcer. He is advised to abstain from acidic foods and beverages, including coffee, and to ensure food intake prior to coffee consumption. He is also counseled against the concurrent use of ibuprofen or Motrin with aspirin, with Tylenol being a safer alternative. A prescription for Protonix 40 mg will be provided, with instructions to take one tablet daily for a duration of one month. The prescription will be sent to [facility]. If the condition does not improve, a referral to a gastroenterologist for further evaluation, including a possible endoscopy, will be considered.    2. Medicare wellness visit.  He is due for his annual blood work. His PSA levels have shown a slight increase, necessitating a recheck in 04/2025. He is encouraged to incorporate more physical activity into his routine and to strive for a balanced diet rich in fruits and vegetables. Orders for blood work will be provided.    Follow-up  The patient will follow up in 6 months.  Medicare annual wellness visit, subsequent  Lipid screening  -     Lipid Panel; Future  Hyperglycemia  -     Hemoglobin A1C; Future  Gastroesophageal reflux disease without esophagitis  -     pantoprazole (PROTONIX) 20 MG tablet; Take 1 tablet by mouth every morning (before breakfast), Disp-30 tablet, R-0Normal  Primary hypertension  -     Comprehensive Metabolic Panel; Future  Mixed hyperlipidemia  -     Comprehensive Metabolic Panel; Future  Prediabetes  Fatigue, unspecified type  -     CBC; Future    Results  Laboratory Studies  PSA is elevated.     No follow-ups on file.     Subjective     History of Present Illness  The patient is a 79-year-old male who presents

## 2025-02-11 LAB
ESTIMATED AVERAGE GLUCOSE: 105 MG/DL
HBA1C MFR BLD: 5.3 % (ref 4–6)

## 2025-04-09 DIAGNOSIS — N40.1 BPH WITH LOWER URINARY TRACT SYMPTOMS WITHOUT URINARY OBSTRUCTION: Primary | ICD-10-CM

## 2025-04-09 DIAGNOSIS — N40.1 BPH WITH LOWER URINARY TRACT SYMPTOMS WITHOUT URINARY OBSTRUCTION: ICD-10-CM

## 2025-04-09 LAB — PSA SERPL-MCNC: 5.02 NG/ML (ref 0–4)

## 2025-04-11 ENCOUNTER — OFFICE VISIT (OUTPATIENT)
Age: 80
End: 2025-04-11
Payer: MEDICARE

## 2025-04-11 VITALS
SYSTOLIC BLOOD PRESSURE: 120 MMHG | HEIGHT: 69 IN | BODY MASS INDEX: 32.58 KG/M2 | WEIGHT: 220 LBS | HEART RATE: 75 BPM | DIASTOLIC BLOOD PRESSURE: 72 MMHG

## 2025-04-11 DIAGNOSIS — R97.20 ELEVATED PSA: Primary | ICD-10-CM

## 2025-04-11 DIAGNOSIS — N40.1 BPH WITH LOWER URINARY TRACT SYMPTOMS WITHOUT URINARY OBSTRUCTION: ICD-10-CM

## 2025-04-11 LAB
BILIRUBIN, POC: ABNORMAL
BLOOD URINE, POC: ABNORMAL
CLARITY, POC: CLEAR
COLOR, POC: YELLOW
GLUCOSE URINE, POC: ABNORMAL MG/DL
KETONES, POC: ABNORMAL MG/DL
LEUKOCYTE EST, POC: ABNORMAL
NITRITE, POC: ABNORMAL
PH, POC: 7
PROTEIN, POC: ABNORMAL MG/DL
SPECIFIC GRAVITY, POC: 1.02
UROBILINOGEN, POC: 0.2 MG/DL

## 2025-04-11 PROCEDURE — 3078F DIAST BP <80 MM HG: CPT | Performed by: UROLOGY

## 2025-04-11 PROCEDURE — 1160F RVW MEDS BY RX/DR IN RCRD: CPT | Performed by: UROLOGY

## 2025-04-11 PROCEDURE — 3074F SYST BP LT 130 MM HG: CPT | Performed by: UROLOGY

## 2025-04-11 PROCEDURE — 1123F ACP DISCUSS/DSCN MKR DOCD: CPT | Performed by: UROLOGY

## 2025-04-11 PROCEDURE — PBSHW POCT URINALYSIS DIPSTICK W/O MICROSCOPE (AUTO): Performed by: UROLOGY

## 2025-04-11 PROCEDURE — 81003 URINALYSIS AUTO W/O SCOPE: CPT | Performed by: UROLOGY

## 2025-04-11 PROCEDURE — G8417 CALC BMI ABV UP PARAM F/U: HCPCS | Performed by: UROLOGY

## 2025-04-11 PROCEDURE — 1036F TOBACCO NON-USER: CPT | Performed by: UROLOGY

## 2025-04-11 PROCEDURE — 99212 OFFICE O/P EST SF 10 MIN: CPT | Performed by: UROLOGY

## 2025-04-11 PROCEDURE — 99213 OFFICE O/P EST LOW 20 MIN: CPT | Performed by: UROLOGY

## 2025-04-11 PROCEDURE — G8427 DOCREV CUR MEDS BY ELIG CLIN: HCPCS | Performed by: UROLOGY

## 2025-04-11 PROCEDURE — 1159F MED LIST DOCD IN RCRD: CPT | Performed by: UROLOGY

## 2025-04-11 ASSESSMENT — ENCOUNTER SYMPTOMS: ABDOMINAL PAIN: 0

## 2025-04-11 NOTE — PROGRESS NOTES
Negative R NEG R   Bilirubin Urine  Negative R Negative R Negative R Negative R Negative R NEG R   Ketones, Urine  Negative R Negative R Negative R Negative R Negative R NEG R   Specific Gravity, UA  1.018 R 1.021 R 1.023 R 1.024 R 1.020 R 1.019 R   Blood, Urine  Negative R Negative R Negative R Negative R Negative R NEG R   pH, UA  5.5 R 6.0 R 5.5 R 6.5 R 7.0 R 6.0   Urobilinogen, Urine  0.2 R 0.2 R 0.2 R 0.2 R 1.0 R <1.0   Resulting Agency  [facility]              Assessment:       This is a 78 yo white male with h/o HTN, CAD, CVA (lt eye vision loss), kidney stones, BPH w/LUTs on Proscar and Flomax (stable) and elevated and rising PSA despite use of Proscar which is unexplained. U/A does not suggest infection. I recommend further evaluation and options of TRUS-guided biopsy vs [facility] x urine testing vs MRI and possible fusion biopsy and he wants the latter. He understands the approx 50% risk for prostate cancer based on the current corrected PSA.       Plan:      Prostate MRI and F/U 1-2 weeks after to review   Get Ok from Dr Feng if hip is MRI compatible        Shaun Heath MD

## 2025-04-24 ENCOUNTER — TELEPHONE (OUTPATIENT)
Age: 80
End: 2025-04-24

## 2025-04-30 ENCOUNTER — TELEPHONE (OUTPATIENT)
Dept: FAMILY MEDICINE CLINIC | Age: 80
End: 2025-04-30

## 2025-04-30 NOTE — TELEPHONE ENCOUNTER
Urology is trying to get an MRI clearance for the pt and need consent for treatment because the pt had a hip replacement from Dr. Feng. Please print out the MRI order and have Dr. Feng sign the form and fax to urology so the pt can get this MRI done.    Fax [phone]

## 2025-05-02 ENCOUNTER — OFFICE VISIT (OUTPATIENT)
Age: 80
End: 2025-05-02
Payer: MEDICARE

## 2025-05-02 VITALS
RESPIRATION RATE: 16 BRPM | OXYGEN SATURATION: 95 % | SYSTOLIC BLOOD PRESSURE: 118 MMHG | BODY MASS INDEX: 34.13 KG/M2 | DIASTOLIC BLOOD PRESSURE: 88 MMHG | WEIGHT: 227.8 LBS | HEART RATE: 70 BPM

## 2025-05-02 DIAGNOSIS — I10 ESSENTIAL HYPERTENSION: Primary | ICD-10-CM

## 2025-05-02 DIAGNOSIS — I25.10 CORONARY ARTERY DISEASE INVOLVING NATIVE CORONARY ARTERY OF NATIVE HEART WITHOUT ANGINA PECTORIS: ICD-10-CM

## 2025-05-02 DIAGNOSIS — E78.2 MIXED HYPERLIPIDEMIA: ICD-10-CM

## 2025-05-02 PROCEDURE — 1123F ACP DISCUSS/DSCN MKR DOCD: CPT | Performed by: INTERNAL MEDICINE

## 2025-05-02 PROCEDURE — G8417 CALC BMI ABV UP PARAM F/U: HCPCS | Performed by: INTERNAL MEDICINE

## 2025-05-02 PROCEDURE — 1036F TOBACCO NON-USER: CPT | Performed by: INTERNAL MEDICINE

## 2025-05-02 PROCEDURE — G8427 DOCREV CUR MEDS BY ELIG CLIN: HCPCS | Performed by: INTERNAL MEDICINE

## 2025-05-02 PROCEDURE — 93010 ELECTROCARDIOGRAM REPORT: CPT | Performed by: INTERNAL MEDICINE

## 2025-05-02 PROCEDURE — 1159F MED LIST DOCD IN RCRD: CPT | Performed by: INTERNAL MEDICINE

## 2025-05-02 PROCEDURE — 3079F DIAST BP 80-89 MM HG: CPT | Performed by: INTERNAL MEDICINE

## 2025-05-02 PROCEDURE — 1160F RVW MEDS BY RX/DR IN RCRD: CPT | Performed by: INTERNAL MEDICINE

## 2025-05-02 PROCEDURE — 1126F AMNT PAIN NOTED NONE PRSNT: CPT | Performed by: INTERNAL MEDICINE

## 2025-05-02 PROCEDURE — 93005 ELECTROCARDIOGRAM TRACING: CPT | Performed by: INTERNAL MEDICINE

## 2025-05-02 PROCEDURE — 3074F SYST BP LT 130 MM HG: CPT | Performed by: INTERNAL MEDICINE

## 2025-05-02 PROCEDURE — 99213 OFFICE O/P EST LOW 20 MIN: CPT | Performed by: INTERNAL MEDICINE

## 2025-05-02 PROCEDURE — 99214 OFFICE O/P EST MOD 30 MIN: CPT | Performed by: INTERNAL MEDICINE

## 2025-05-02 NOTE — PROGRESS NOTES
pulses normal, no pedal edema, no clubbing or cyanosis  Skin - normal coloration and turgor, no rashes, no suspicious skin lesions noted      EKG:     Orders Placed This Encounter   Procedures    EKG 12 Lead       ASSESSMENT:     Diagnosis Orders   1. Essential hypertension  EKG 12 Lead      2. Coronary artery disease involving native coronary artery of native heart without angina pectoris  EKG 12 Lead      3. Mixed hyperlipidemia  EKG 12 Lead            PLAN:       As always, aggressive risk factor modification is strongly recommended. We should adhere to the JNC VIII guidelines for HTN management and the NCEP ATP III guidelines for LDL-C management.    Cardiac diet is always recommended with low fat, cholesterol, calories and sodium.    Continue medications at current doses.     Weight loss discussed.     Check EKG    Consider echocardiogram in the future if symptomatic.    Patient was advised and encouraged to check blood pressure at home or at a pharmacy, maintain a logbook, and also call us back if blood pressure are above the target ranges or if it is low. Patient clearly understands and agrees to the instructions.     We will need to continue to monitor muscle and liver enzymes, BUN, CR, and electrolytes.

## 2025-05-05 ENCOUNTER — HOSPITAL ENCOUNTER (OUTPATIENT)
Dept: MRI IMAGING | Age: 80
Discharge: HOME | End: 2025-05-07
Attending: UROLOGY
Payer: MEDICARE

## 2025-05-05 DIAGNOSIS — R97.20 ELEVATED PSA: ICD-10-CM

## 2025-05-05 PROCEDURE — 72197 MRI PELVIS W/O & W/DYE: CPT

## 2025-05-05 PROCEDURE — A9577 INJ MULTIHANCE: HCPCS | Performed by: UROLOGY

## 2025-05-05 PROCEDURE — 72197 MRI PELVIS W/O & W/DYE: CPT | Performed by: RADIOLOGY

## 2025-05-05 PROCEDURE — 6360000004 HC RX CONTRAST MEDICATION: Performed by: UROLOGY

## 2025-05-05 RX ADMIN — GADOBENATE DIMEGLUMINE 20 ML: 529 INJECTION, SOLUTION INTRAVENOUS at 07:58

## 2025-05-13 DIAGNOSIS — N40.1 BENIGN PROSTATIC HYPERPLASIA WITH INCOMPLETE BLADDER EMPTYING: Primary | ICD-10-CM

## 2025-05-13 DIAGNOSIS — R39.14 BENIGN PROSTATIC HYPERPLASIA WITH INCOMPLETE BLADDER EMPTYING: Primary | ICD-10-CM

## 2025-05-13 NOTE — PROGRESS NOTES
MRI placed for prostate that needs to be done under [facility] protocol. Dr Heath has been Ccd so he can be aware of results.

## 2025-05-20 ENCOUNTER — OFFICE VISIT (OUTPATIENT)
Age: 80
End: 2025-05-20
Payer: MEDICARE

## 2025-05-20 VITALS
BODY MASS INDEX: 33.47 KG/M2 | WEIGHT: 226 LBS | HEART RATE: 74 BPM | SYSTOLIC BLOOD PRESSURE: 110 MMHG | DIASTOLIC BLOOD PRESSURE: 72 MMHG | HEIGHT: 69 IN

## 2025-05-20 DIAGNOSIS — N40.1 BPH WITH LOWER URINARY TRACT SYMPTOMS WITHOUT URINARY OBSTRUCTION: ICD-10-CM

## 2025-05-20 DIAGNOSIS — R97.20 ELEVATED PSA: Primary | ICD-10-CM

## 2025-05-20 DIAGNOSIS — R93.89 ABNORMAL MRI: ICD-10-CM

## 2025-05-20 PROCEDURE — 3078F DIAST BP <80 MM HG: CPT | Performed by: UROLOGY

## 2025-05-20 PROCEDURE — 1036F TOBACCO NON-USER: CPT | Performed by: UROLOGY

## 2025-05-20 PROCEDURE — 3074F SYST BP LT 130 MM HG: CPT | Performed by: UROLOGY

## 2025-05-20 PROCEDURE — G8417 CALC BMI ABV UP PARAM F/U: HCPCS | Performed by: UROLOGY

## 2025-05-20 PROCEDURE — 1160F RVW MEDS BY RX/DR IN RCRD: CPT | Performed by: UROLOGY

## 2025-05-20 PROCEDURE — 99213 OFFICE O/P EST LOW 20 MIN: CPT | Performed by: UROLOGY

## 2025-05-20 PROCEDURE — 1123F ACP DISCUSS/DSCN MKR DOCD: CPT | Performed by: UROLOGY

## 2025-05-20 PROCEDURE — 99214 OFFICE O/P EST MOD 30 MIN: CPT | Performed by: UROLOGY

## 2025-05-20 PROCEDURE — G8427 DOCREV CUR MEDS BY ELIG CLIN: HCPCS | Performed by: UROLOGY

## 2025-05-20 PROCEDURE — 1159F MED LIST DOCD IN RCRD: CPT | Performed by: UROLOGY

## 2025-05-20 RX ORDER — CIPROFLOXACIN 500 MG/1
500 TABLET, FILM COATED ORAL 2 TIMES DAILY
Qty: 4 TABLET | Refills: 0 | Status: SHIPPED | OUTPATIENT
Start: 2025-05-20

## 2025-05-20 NOTE — PROGRESS NOTES
Notes  Details    Reading Physician Reading Date Result Priority   Tito Boyer MD  [phone]     5/5/2025      Narrative & Impression  MRI PROSTATE W WO CONTRAST     Clinical history: Elevated PSA, concern for prostate cancer.      Comparison: None available.     Technique: Multisequence multiplanar MRI of the prostate prior to and after  administration of contrast.     Findings:  Prostate volume: 121.04 cc by bullet volume criteria.     In the left lateral transition zone extending from the midline to the lateral  edge at the level of the base is a T2 hypointense lesion measuring 24 x 17 mm  which is avidly increased in signal on diffusion weighted imaging and low signal  on ADC imaging. This is consistent with PI-RADS category 5.     In the left posterior transition zone at the level of the mid body is a T2  hypointense lesion with lobulated margins measuring 13 x 14 mm which is avidly  hyperintense on diffusion weighted imaging and low in signal on ADC imaging.  This is consistent with PI-RADS category 4.     In the right lateral transition zone at the level of the mid body is an  ill-defined T2 hypointense region which is borderline signal intensity on  diffusion weighted imaging but decreased signal on ADC imaging. This measures 10  x 7 mm. This is consistent with PI-RADS category 3.     The bilateral seminal vesicles are low signal intensity throughout without clear  demarcation of the tubules. This may be secondary to atrophy, versus the  presence of proteinaceous material versus hemorrhage.     No evidence of extracapsular extension of any soft tissue tumor from the  prostate. The visualized urinary bladder demonstrates numerous small  diverticula. The rectum and rectal wall are unremarkable. Incidental note is  made of diverticulosis of the visualized colon. Visualized osseous structures  are unremarkable. No evidence of lymphadenopathy. Incidental note is made of  moderate spinal stenosis due to

## 2025-06-02 NOTE — PROGRESS NOTES
VASCULAR MEDICINE AND INTERVENTIONAL RADIOLOGY DEPARTMENT:     Chief Complaint   Patient presents with    Establish Care     Referral from Dr. Heath for prostate biopsy     HPI: Flex Allen, a male of 80 y.o. came to the office 6/3/2025, referred by Dr. Heath, for prostate biopsy.  Patient following with urology for history of elevated PSA. Had MRI prostate revealing a grade 5 lesion in the left lateral transition zone, category 4 lesion in the left posterior transition zone, category 3 lesion in the right lateral transition zone with recommendation for biopsy.  Patient denies history of prior prostate biopsy. Patient denies family history of prostate cancer.   Patient denies blood or bleeding disorders. He denies lung disease, denies SOB. He denies liver disease. Denies history of MI or stroke.   He takes ASA as a preventative medication, but does note that he has had a stent placed, he is unsure of the year but states was several years ago before following w/ cardiology at [facility]. Per chart review, unable to see history of stent placement.  Patient takes ibuprofen otc as needed. Takes vitamins. Denies other blood thinning or anticoagulation medications.  He denies smoking history. He drinks socially. Patient denies drug use.  Denies chest pain.     Medical history: Hypertension, hyperlipidemia, CAD s/p stent placement (?unable to see stent placement per chart review), CVA with left eye vision loss, hx nephrolithiasis, BPH    Family History   Problem Relation Age of Onset    Stroke Father        Past Surgical History:   Procedure Laterality Date    CARPAL TUNNEL RELEASE Right 05/30/2017    CCRAVEN PEARL MD    CARPAL TUNNEL RELEASE Left 06/2017    CCRAVEN PEARL MD    COLONOSCOPY  6/2015    Dr. Lugo    EYE SURGERY      lower eyelids    HERNIA REPAIR  1970    KIDNEY STONE SURGERY  1983    LIPOMA RESECTION  3/22/16    DR. BARRAGAN    OTHER SURGICAL HISTORY Left 1/2015    hydrocelectomy    TOTAL HIP ARTHROPLASTY

## 2025-06-03 ENCOUNTER — TELEPHONE (OUTPATIENT)
Age: 80
End: 2025-06-03

## 2025-06-03 ENCOUNTER — OFFICE VISIT (OUTPATIENT)
Age: 80
End: 2025-06-03
Payer: MEDICARE

## 2025-06-03 VITALS
BODY MASS INDEX: 33.47 KG/M2 | SYSTOLIC BLOOD PRESSURE: 127 MMHG | OXYGEN SATURATION: 98 % | RESPIRATION RATE: 16 BRPM | HEIGHT: 69 IN | DIASTOLIC BLOOD PRESSURE: 80 MMHG | HEART RATE: 72 BPM | WEIGHT: 226 LBS

## 2025-06-03 DIAGNOSIS — Z01.812 PRE-OPERATIVE LABORATORY EXAMINATION: ICD-10-CM

## 2025-06-03 DIAGNOSIS — R97.20 ELEVATED PSA: Primary | ICD-10-CM

## 2025-06-03 DIAGNOSIS — R93.89 ABNORMAL MRI: ICD-10-CM

## 2025-06-03 DIAGNOSIS — Z86.79 HISTORY OF HYPERTENSION: ICD-10-CM

## 2025-06-03 PROCEDURE — G8427 DOCREV CUR MEDS BY ELIG CLIN: HCPCS

## 2025-06-03 PROCEDURE — 1123F ACP DISCUSS/DSCN MKR DOCD: CPT

## 2025-06-03 PROCEDURE — 1159F MED LIST DOCD IN RCRD: CPT

## 2025-06-03 PROCEDURE — 1036F TOBACCO NON-USER: CPT

## 2025-06-03 PROCEDURE — 99204 OFFICE O/P NEW MOD 45 MIN: CPT

## 2025-06-03 PROCEDURE — G8417 CALC BMI ABV UP PARAM F/U: HCPCS

## 2025-06-03 PROCEDURE — 99203 OFFICE O/P NEW LOW 30 MIN: CPT

## 2025-06-03 PROCEDURE — 3074F SYST BP LT 130 MM HG: CPT

## 2025-06-03 PROCEDURE — 3079F DIAST BP 80-89 MM HG: CPT

## 2025-06-03 RX ORDER — ASPIRIN 81 MG/1
81 TABLET ORAL DAILY
COMMUNITY

## 2025-06-03 NOTE — TELEPHONE ENCOUNTER
Patient was given this information prior to leaving the office - voiced understanding  2.  Spoke to Maria Elena in [facility] to schedule procedure    >  Prostate biopsy is scheduled on 6/18/2025 @ 12:00 pm  >  You will need to arrive at 10:00 am from home and check in at the Diagnostic Imaging Check In desk.   >  Do not eat or drink after midnight.    >  Day before the procedure - eat a light breakfast and then only clear liquids the rest of the day   >  Patient will make arrangements for transportation by daughter  >  Patient to hold Aspirin, Advil/Motrin/Ibuprofen and Vitamins for 14 days prior to procedure - starting 6/4/2025  >  Patient to take metoprolol with a small sip of water morning of the procedure  >  Patient to have PT/INR, CBC and BMP drawn 2-4 days prior to procedure  >  Bowl Prep: Mix 8.3 ounces (238 g) of MiraLAX in 64 ounces of Gatorade.  The day before biopsy by 11:00 am, drink half of the mixture (32 ounces), then by 7:00 pm that evening, dring the remainder (32 ounces) of the mixture. The day of the biopsy, administer over-the-counter Fleets enema 2-3 hours prior to arrival to the hospital  > Antibiotics:  Take antibiotics as instructed on the bottle - if you have any questions, call Dr. Heath' office

## 2025-06-04 ENCOUNTER — PRE-PROCEDURE TELEPHONE (OUTPATIENT)
Dept: INTERVENTIONAL RADIOLOGY/VASCULAR | Age: 80
End: 2025-06-04

## 2025-06-04 NOTE — FLOWSHEET NOTE
Called patient to review pre-procedure instructions re: upcoming IR appointment. No answer, voicemail message left. Reminded patient to begin holding asa, advil / motrin / ibuprofen, and vitamins x 14 days (which starts today) as instructed by the office. Also reminded patient to have lab work drawn 2-4 days prior to procedure. Patient informed to expect a call from Radiology RN closer to procedure date to review instructions and and address any issues / concerns that may arise. Electronically signed by Elena Randle RN on 6/4/2025 at 1:31 PM

## 2025-06-16 ENCOUNTER — TELEPHONE (OUTPATIENT)
Dept: INTERVENTIONAL RADIOLOGY/VASCULAR | Age: 80
End: 2025-06-16

## 2025-06-16 DIAGNOSIS — R97.20 ELEVATED PSA: ICD-10-CM

## 2025-06-16 DIAGNOSIS — Z86.79 HISTORY OF HYPERTENSION: ICD-10-CM

## 2025-06-16 DIAGNOSIS — R93.89 ABNORMAL MRI: ICD-10-CM

## 2025-06-16 DIAGNOSIS — Z01.812 PRE-OPERATIVE LABORATORY EXAMINATION: ICD-10-CM

## 2025-06-16 LAB
ANION GAP SERPL CALCULATED.3IONS-SCNC: 9 MEQ/L (ref 9–15)
BUN SERPL-MCNC: 16 MG/DL (ref 8–23)
CALCIUM SERPL-MCNC: 9.3 MG/DL (ref 8.5–9.9)
CHLORIDE SERPL-SCNC: 101 MEQ/L (ref 95–107)
CO2 SERPL-SCNC: 27 MEQ/L (ref 20–31)
CREAT SERPL-MCNC: 1.05 MG/DL (ref 0.7–1.2)
ERYTHROCYTE [DISTWIDTH] IN BLOOD BY AUTOMATED COUNT: 12.5 % (ref 11.5–14.5)
GLUCOSE SERPL-MCNC: 95 MG/DL (ref 70–99)
HCT VFR BLD AUTO: 49 % (ref 42–52)
HGB BLD-MCNC: 16.5 G/DL (ref 14–18)
INR PPP: 1
MCH RBC QN AUTO: 30.6 PG (ref 27–31.3)
MCHC RBC AUTO-ENTMCNC: 33.7 % (ref 33–37)
MCV RBC AUTO: 90.7 FL (ref 79–92.2)
PLATELET # BLD AUTO: 246 K/UL (ref 130–400)
POTASSIUM SERPL-SCNC: 4 MEQ/L (ref 3.4–4.9)
PROTHROMBIN TIME: 13.6 SEC (ref 12.3–14.9)
RBC # BLD AUTO: 5.4 M/UL (ref 4.7–6.1)
SODIUM SERPL-SCNC: 137 MEQ/L (ref 135–144)
WBC # BLD AUTO: 8.3 K/UL (ref 4.8–10.8)

## 2025-06-16 NOTE — TELEPHONE ENCOUNTER
Pre-procedure phone call made to patient. No answer, the following information left on his voicemail:      Prostate biopsy is scheduled on 6/18/2025 @ 12:00 pm  >  You will need to arrive at 10:00 am from home and check in at the Diagnostic Imaging Check In desk.   >  Do not eat or drink after midnight.    >  Day before the procedure - eat a light breakfast and then only clear liquids the rest of the day   >  Patient will make arrangements for transportation by daughter  >  Patient to hold Aspirin, Advil/Motrin/Ibuprofen and Vitamins for 14 days prior to procedure - starting 6/4/2025  >  Patient to take metoprolol with a small sip of water morning of the procedure  >  Patient to have PT/INR, CBC and BMP drawn 2-4 days prior to procedure  >  Bowl Prep: Mix 8.3 ounces (238 g) of MiraLAX in 64 ounces of Gatorade.  The day before biopsy by 11:00 am, drink half of the mixture (32 ounces), then by 7:00 pm that evening, dring the remainder (32 ounces) of the mixture. The day of the biopsy, administer over-the-counter Fleets enema 2-3 hours prior to arrival to the hospital  > Antibiotics:  Take antibiotics as instructed on the bottle - if you have any questions, call Dr. Heath' office

## 2025-06-18 ENCOUNTER — ANESTHESIA (OUTPATIENT)
Dept: INTERVENTIONAL RADIOLOGY/VASCULAR | Age: 80
End: 2025-06-18
Payer: MEDICARE

## 2025-06-18 ENCOUNTER — ANESTHESIA EVENT (OUTPATIENT)
Dept: INTERVENTIONAL RADIOLOGY/VASCULAR | Age: 80
End: 2025-06-18
Payer: MEDICARE

## 2025-06-18 ENCOUNTER — HOSPITAL ENCOUNTER (OUTPATIENT)
Dept: INTERVENTIONAL RADIOLOGY/VASCULAR | Age: 80
Discharge: HOME | End: 2025-06-20
Payer: MEDICARE

## 2025-06-18 VITALS
DIASTOLIC BLOOD PRESSURE: 74 MMHG | TEMPERATURE: 98.5 F | SYSTOLIC BLOOD PRESSURE: 128 MMHG | BODY MASS INDEX: 33.47 KG/M2 | HEART RATE: 68 BPM | RESPIRATION RATE: 14 BRPM | HEIGHT: 69 IN | WEIGHT: 226 LBS | OXYGEN SATURATION: 96 %

## 2025-06-18 DIAGNOSIS — R93.89 ABNORMAL MRI: ICD-10-CM

## 2025-06-18 DIAGNOSIS — R97.20 ELEVATED PSA: ICD-10-CM

## 2025-06-18 PROCEDURE — 6360000002 HC RX W HCPCS: Performed by: ANESTHESIOLOGIST ASSISTANT

## 2025-06-18 PROCEDURE — 88305 TISSUE EXAM BY PATHOLOGIST: CPT

## 2025-06-18 PROCEDURE — 55706 BX PRST8 NDL SAT SAMPLING: CPT

## 2025-06-18 PROCEDURE — 6360000002 HC RX W HCPCS: Performed by: NURSE PRACTITIONER

## 2025-06-18 PROCEDURE — 7100000011 HC PHASE II RECOVERY - ADDTL 15 MIN

## 2025-06-18 PROCEDURE — 6370000000 HC RX 637 (ALT 250 FOR IP): Performed by: RADIOLOGY

## 2025-06-18 PROCEDURE — 55700 US BIOPSY PROSTATE NEEDLE/PUNCH: CPT | Performed by: RADIOLOGY

## 2025-06-18 PROCEDURE — 88342 IMHCHEM/IMCYTCHM 1ST ANTB: CPT

## 2025-06-18 PROCEDURE — 76942 ECHO GUIDE FOR BIOPSY: CPT | Performed by: RADIOLOGY

## 2025-06-18 PROCEDURE — 2709999900 US BIOPSY PROSTATE NEEDLE/PUNCH

## 2025-06-18 PROCEDURE — 6360000002 HC RX W HCPCS: Performed by: RADIOLOGY

## 2025-06-18 PROCEDURE — 2580000003 HC RX 258: Performed by: ANESTHESIOLOGIST ASSISTANT

## 2025-06-18 PROCEDURE — 7100000010 HC PHASE II RECOVERY - FIRST 15 MIN

## 2025-06-18 RX ORDER — OXYCODONE HYDROCHLORIDE 5 MG/1
5 TABLET ORAL
Refills: 0 | OUTPATIENT
Start: 2025-06-18

## 2025-06-18 RX ORDER — NEOMYCIN/BACITRACIN/POLYMYXINB 3.5-400-5K
OINTMENT (GRAM) TOPICAL PRN
Status: COMPLETED | OUTPATIENT
Start: 2025-06-18 | End: 2025-06-18

## 2025-06-18 RX ORDER — PROPOFOL 10 MG/ML
INJECTION, EMULSION INTRAVENOUS
Status: DISCONTINUED | OUTPATIENT
Start: 2025-06-18 | End: 2025-06-18 | Stop reason: SDUPTHER

## 2025-06-18 RX ORDER — ONDANSETRON 2 MG/ML
INJECTION INTRAMUSCULAR; INTRAVENOUS
Status: DISCONTINUED | OUTPATIENT
Start: 2025-06-18 | End: 2025-06-18 | Stop reason: SDUPTHER

## 2025-06-18 RX ORDER — LEVOFLOXACIN 5 MG/ML
500 INJECTION, SOLUTION INTRAVENOUS ONCE
Status: COMPLETED | OUTPATIENT
Start: 2025-06-18 | End: 2025-06-18

## 2025-06-18 RX ORDER — SODIUM CHLORIDE 0.9 % (FLUSH) 0.9 %
5-40 SYRINGE (ML) INJECTION EVERY 12 HOURS SCHEDULED
OUTPATIENT
Start: 2025-06-18

## 2025-06-18 RX ORDER — FENTANYL CITRATE 0.05 MG/ML
50 INJECTION, SOLUTION INTRAMUSCULAR; INTRAVENOUS EVERY 10 MIN PRN
Refills: 0 | OUTPATIENT
Start: 2025-06-18

## 2025-06-18 RX ORDER — ONDANSETRON 2 MG/ML
4 INJECTION INTRAMUSCULAR; INTRAVENOUS
OUTPATIENT
Start: 2025-06-18

## 2025-06-18 RX ORDER — SODIUM CHLORIDE 0.9 % (FLUSH) 0.9 %
5-40 SYRINGE (ML) INJECTION PRN
OUTPATIENT
Start: 2025-06-18

## 2025-06-18 RX ORDER — LIDOCAINE HYDROCHLORIDE 20 MG/ML
INJECTION, SOLUTION INFILTRATION; PERINEURAL PRN
Status: COMPLETED | OUTPATIENT
Start: 2025-06-18 | End: 2025-06-18

## 2025-06-18 RX ORDER — MEPERIDINE HYDROCHLORIDE 25 MG/ML
12.5 INJECTION INTRAMUSCULAR; INTRAVENOUS; SUBCUTANEOUS
Refills: 0 | OUTPATIENT
Start: 2025-06-18

## 2025-06-18 RX ORDER — SODIUM CHLORIDE 9 MG/ML
INJECTION, SOLUTION INTRAVENOUS PRN
OUTPATIENT
Start: 2025-06-18

## 2025-06-18 RX ORDER — DIPHENHYDRAMINE HYDROCHLORIDE 50 MG/ML
12.5 INJECTION, SOLUTION INTRAMUSCULAR; INTRAVENOUS
OUTPATIENT
Start: 2025-06-18

## 2025-06-18 RX ORDER — SODIUM CHLORIDE 9 MG/ML
INJECTION, SOLUTION INTRAVENOUS
Status: DISCONTINUED | OUTPATIENT
Start: 2025-06-18 | End: 2025-06-18 | Stop reason: SDUPTHER

## 2025-06-18 RX ORDER — METOCLOPRAMIDE HYDROCHLORIDE 5 MG/ML
10 INJECTION INTRAMUSCULAR; INTRAVENOUS
OUTPATIENT
Start: 2025-06-18

## 2025-06-18 RX ORDER — LIDOCAINE HYDROCHLORIDE 10 MG/ML
INJECTION, SOLUTION EPIDURAL; INFILTRATION; INTRACAUDAL; PERINEURAL
Status: DISCONTINUED | OUTPATIENT
Start: 2025-06-18 | End: 2025-06-18 | Stop reason: SDUPTHER

## 2025-06-18 RX ORDER — NALOXONE HYDROCHLORIDE 0.4 MG/ML
INJECTION, SOLUTION INTRAMUSCULAR; INTRAVENOUS; SUBCUTANEOUS PRN
OUTPATIENT
Start: 2025-06-18

## 2025-06-18 RX ADMIN — ONDANSETRON 4 MG: 2 INJECTION, SOLUTION INTRAMUSCULAR; INTRAVENOUS at 11:10

## 2025-06-18 RX ADMIN — PROPOFOL 40 MG: 10 INJECTION, EMULSION INTRAVENOUS at 11:13

## 2025-06-18 RX ADMIN — LIDOCAINE HYDROCHLORIDE 20 ML: 20 INJECTION, SOLUTION INFILTRATION; PERINEURAL at 11:17

## 2025-06-18 RX ADMIN — PROPOFOL 160 MG: 10 INJECTION, EMULSION INTRAVENOUS at 11:07

## 2025-06-18 RX ADMIN — BACITRACIN, NEOMYCIN, POLYMYXIN B 1 EACH: 400; 3.5; 5 OINTMENT TOPICAL at 11:32

## 2025-06-18 RX ADMIN — LEVOFLOXACIN 500 MG: 500 INJECTION, SOLUTION INTRAVENOUS at 10:46

## 2025-06-18 RX ADMIN — LIDOCAINE HYDROCHLORIDE 50 MG: 10 INJECTION, SOLUTION EPIDURAL; INFILTRATION; INTRACAUDAL; PERINEURAL at 11:07

## 2025-06-18 RX ADMIN — SODIUM CHLORIDE: 0.9 INJECTION, SOLUTION INTRAVENOUS at 10:58

## 2025-06-18 ASSESSMENT — PAIN - FUNCTIONAL ASSESSMENT: PAIN_FUNCTIONAL_ASSESSMENT: NONE - DENIES PAIN

## 2025-06-18 NOTE — PROGRESS NOTES
Pt voided per urinal     1225 IV removed. Pt tolerated well.     1228 Band-lucia to perineum clean, dry, and intact. No bleeding or oozing. Surrounding area soft and without any bruising.     1232  Pt voiding another 125 mls clear jonathan urine. Pt changed into street clothes independently.     1237 Discharge instructions reviewed with pt who voices understanding.     Pt taken via wheel chair with all belongings for discharge home with friend.

## 2025-06-18 NOTE — DISCHARGE INSTRUCTIONS
HOME GOING INSTRUCTIONS FOLLOWING PROSTATE BIOPSY    1. ACTIVITY   Rest and avoid strenuous activity, such as bending or lifting heavy objects or straining with bowel movements for at least 48 hours.    Do not lift anything heavier than one gallon of milk x one week.  Avoid intercourse for at least one week.  Avoid baths, hot tubs, pools, and lakes for 2 weeks or longer if biopsy site not fully healed.   May shower after 24 hours and remove band aid. Pat biopsy site dry with clean towel.       2. DIET   Resume usual diet    3. MEDICATIONS   A. Resume home medications unless otherwise indicated by your physician.   B. May take non-aspirin pain reliever as needed.   C.  Continue antibiotic per physician's prescription.      Comments:    A small amount of pain after the biopsy should stay the same or begin to    lessen and should be easily controlled with mild medication.     4. DRAINAGE   Some blood in the urine, semen, or from the rectum is expected and may last for   three days after the biopsy.    5. IF YOU DEVELOP ANY OF THE FOLLOWING SYMPTOMS, CONTACT EITHER       YOUR FAMILY DOCTOR OR REPORT TO THE EMERGENCY ROOM FOR       FURTHER EVALUATION:   A.  Extreme pain   B. Active bleeding   C. Temperature of 100 degrees F and above   D. Pus in the urine    6. OTHER INSTRUCTIONS   If your doctor has not notified you in one week regarding the results of your   biopsy, please call that doctor's office.     Physician performing exam:  DR. KLEIN at [phone].  If you have questions of concerns tonight please call [facility] at [phone] and ask to speak to a RADIOLOGY RN.    If you are unable to contact the above physician and you feel you have a major problem, please go to the Emergency Room for treatment.

## 2025-06-18 NOTE — OR NURSING
Transperineal magnetic resonance imaging and ultrasound guided prostate biopsy with monitored anesthesia care.  SEDATION per anesthesia    Pt brought to OR room 3 via cart from CT holding. Pt assisted onto procedural table and positioned supine.  Sedation and vitals monitoring done per anesthesia. Verbal and tactile support provided to patient.     Pre sedation timeout completed.  Sedation administration started per anesthesia.     Pt sedated. Pt placed into lithotomy position with legs in stirrups.    Pt's perineal area clipped of hair and then cleansed with chlorhexidine by CHARLENE-RN.     Procedural timeout completed.     After 3 minute dry time, sterile draped applied to perineal area.    Dr. Boyer gently inserted [employer] Transrectal probe to obtain ultrasound imaging of prostate, along with patient's MRI imaging downloaded onto [employer] machine, and locates prostate biopsy sites.  [employer] rep, present for procedure.      Perineal area numbed with Lidocaine 2% by Dr. Boyer, see eMar.       Wami Disposable Core Biopsy Instrument 18g x 20cm used to obtain core samples, see below.     Right peripheral zone lesion core specimens obtained at 1119 and placed immediately into PREFER formalin.  Labeled \"A\". Total 4 cores.    Left transition zone lesion core specimens obtained at 1123 and placed immediately into PREFER formalin.  Labeled \"B\". Total 4 cores.    Left lateral zone lesion core specimens obtained at 1125 and placed immediately into PREFER formalin.  Labeled \"C\". Total 5 cores.    Right peripheral zone random core specimens obtained at 1128 and placed immediately into PREFER formalin.  Labeled \"D\". Total 4 cores.    Left peripheral zone random core specimens obtained at 1130 and placed immediately into PREFER formalin.  Labeled \"E\". Total 4 cores.      Total core samples obtained: 21    Total biopsy needles used: 1    Biopsy instrument removed. US rectal probe removed.     Neosporin

## 2025-06-18 NOTE — PROGRESS NOTES
Pt returned to CT holding post prostate biopsy. Remains drowsy, arouses to voice. Denies pain. Bandaid to perineum CD&I.     1150 Pt alert, oriented. Given and drinking water without difficulty. Given breakfast tray and eating without difficulty.    1214 Pt assisted to stand and urinate via urinal. Pt voided 125ml clear jonathan urine.

## 2025-06-18 NOTE — ANESTHESIA PRE PROCEDURE
Department of Anesthesiology  Preprocedure Note       Name:  Flex Allen   Age:  80 y.o.  :  1945                                          MRN:  [id number]         Date:  2025      Surgeon: * Surgery not found *    Procedure:     Medications prior to admission:   Prior to Admission medications    Medication Sig Start Date End Date Taking? Authorizing Provider   aspirin 81 MG EC tablet Take 1 tablet by mouth daily    Checo Pardo MD   ciprofloxacin (CIPRO) 500 MG tablet Take 1 tablet by mouth 2 times daily Start day prior and resume day after 25   Shaun Heath MD   pantoprazole (PROTONIX) 20 MG tablet Take 1 tablet by mouth every morning (before breakfast) 2/10/25   Nandini Arguello APRN - CNP   irbesartan (AVAPRO) 150 MG tablet TAKE 1 TABLET BY MOUTH DAILY 25   Stephan Louis DO   metoprolol succinate (TOPROL XL) 25 MG extended release tablet TAKE 1 TABLET BY MOUTH AT NIGHT 24   Jane Ralph APRN - CNP   simvastatin (ZOCOR) 20 MG tablet TAKE 1 TABLET BY MOUTH AT NIGHT 24   Jane Ralph APRN - CNP   Vitamin D (CHOLECALCIFEROL) 50 MCG (2000) TABS tablet Take 1 tablet by mouth Daily with supper 23   Amber Sweeney APRN - CNP   finasteride (PROSCAR) 5 MG tablet Take 1 tablet by mouth daily 23   Shaun Heath MD   Handicap Placard MISC by Does not apply route Good for 5 years 3/21/23   Nandini Arguello APRN - CNP   tamsulosin (FLOMAX) 0.4 MG capsule Take 1 capsule by mouth daily 20   Shaun Heath MD   Naphazoline-Glycerin (CLEAR EYES COOLING COMFORT OP) Apply to eye    Checo Pardo MD   Ascorbic Acid (VITAMIN C) 500 MG tablet Take 1 tablet by mouth daily    Checo Pardo MD   Multiple Vitamins-Minerals (MULTIVITAMIN PO) Take by mouth    Checo Pardo MD       Current medications:    Current Outpatient Medications   Medication Sig Dispense Refill   • aspirin 81 MG EC tablet Take 1 tablet by

## 2025-06-18 NOTE — PROGRESS NOTES
Pt arrived to CT holding. Pt changed into gown.  Pt hooked up to vitals sign machine. VSS. Medical history, allergies, and home medications reviewed with patient. Consents reviewed with patient and signed. Pt resting on cart in stable condition.     Confirmed NPO status - Pt states he misunderstood the instructions and drank all 64 ounces of bowel prep this am at 0730. Dr. Boyer notified, Dr. Mcdonald also notified and states it's \"not a problem.\"    Confirmed that pt took abx as prescribed yesterday and took metoprolol with sip of water this am at 0730.    1033 Dr. Mcdonlad in to see patient    Iv established to left wrist.     1040 Dr. Boyer in to see patient. Procedure exaplined, including risks benefits.    1046 Pre-op abx given, see eMAR.

## 2025-06-18 NOTE — ANESTHESIA POSTPROCEDURE EVALUATION
Department of Anesthesiology  Postprocedure Note    Patient: Flex Allen  MRN: [id number]  YOB: 1945  Date of evaluation: 6/18/2025    Procedure Summary       Date: 06/18/25 Room / Location: [facility]; [facility]    Anesthesia Start: 1058 Anesthesia Stop: 1143    Procedure: US BIOPSY PROSTATE NEEDLE/PUNCH Diagnosis:       Elevated PSA      Abnormal MRI      (elevated PSA, abnormal MRI)    Scheduled Providers: Tito Boyer MD Responsible Provider: Yury Mcdonald MD    Anesthesia Type: general ASA Status: 3            Anesthesia Type: No value filed.    Cristian Phase I: Cristian Score: 10    Cristian Phase II:      Anesthesia Post Evaluation    Patient location during evaluation: bedside  Patient participation: complete - patient participated  Level of consciousness: awake and awake and alert  Airway patency: patent  Nausea & Vomiting: no nausea and no vomiting  Cardiovascular status: blood pressure returned to baseline and hemodynamically stable  Respiratory status: acceptable  Hydration status: euvolemic  Pain management: adequate        No notable events documented.

## 2025-06-18 NOTE — H&P
Note copied from clinic visit.  Plan: Prostate biopsy with MRI and US guidance.  VASCULAR MEDICINE AND INTERVENTIONAL RADIOLOGY DEPARTMENT:     No chief complaint on file.    HPI: Flex Allen, a male of 80 y.o. came to the office 6/18/2025, referred by Dr. Heath, for prostate biopsy.  Patient following with urology for history of elevated PSA. Had MRI prostate revealing a grade 5 lesion in the left lateral transition zone, category 4 lesion in the left posterior transition zone, category 3 lesion in the right lateral transition zone with recommendation for biopsy.  Patient denies history of prior prostate biopsy. Patient denies family history of prostate cancer.   Patient denies blood or bleeding disorders. He denies lung disease, denies SOB. He denies liver disease. Denies history of MI or stroke.   He takes ASA as a preventative medication, but does note that he has had a stent placed, he is unsure of the year but states was several years ago before following w/ cardiology at [facility]. Per chart review, unable to see history of stent placement.  Patient takes ibuprofen otc as needed. Takes vitamins. Denies other blood thinning or anticoagulation medications.  He denies smoking history. He drinks socially. Patient denies drug use.  Denies chest pain.     Medical history: Hypertension, hyperlipidemia, CAD s/p stent placement (?unable to see stent placement per chart review), CVA with left eye vision loss, hx nephrolithiasis, BPH    Family History   Problem Relation Age of Onset    Stroke Father        Past Surgical History:   Procedure Laterality Date    CARPAL TUNNEL RELEASE Right 05/30/2017    ESME PEARL MD    CARPAL TUNNEL RELEASE Left 06/2017    CCRAVEN PEARL MD    COLONOSCOPY  06/2015    Dr. Lugo    EYE SURGERY      lower eyelids    HERNIA REPAIR  1970    KIDNEY STONE SURGERY  1983    LIPOMA RESECTION  03/22/2016    DR. BARRAGAN    OTHER SURGICAL HISTORY Left 01/2015    hydrocelectomy    PROSTATE BIOPSY

## 2025-06-19 ENCOUNTER — POST-OP TELEPHONE (OUTPATIENT)
Dept: INTERVENTIONAL RADIOLOGY/VASCULAR | Age: 80
End: 2025-06-19

## 2025-06-19 NOTE — FLOWSHEET NOTE
RN call to patient to follow up post-op, as pt had prostate bx on 6/18/25 with Dr. Boyer.     Pt reports 0/10 pain to site. Pts urine is clear. Pt \"feels good.\"     Pt confirms site and dressing is clean, dry, and intact.     Pt has no questions or concerns at this time.     Pt instructed to call back the radiology dept and request to speak with a nurse if any questions or concerns should develop at [phone].

## 2025-06-25 ENCOUNTER — OFFICE VISIT (OUTPATIENT)
Age: 80
End: 2025-06-25
Payer: MEDICARE

## 2025-06-25 VITALS
WEIGHT: 230 LBS | BODY MASS INDEX: 34.07 KG/M2 | HEIGHT: 69 IN | HEART RATE: 77 BPM | DIASTOLIC BLOOD PRESSURE: 70 MMHG | SYSTOLIC BLOOD PRESSURE: 122 MMHG

## 2025-06-25 DIAGNOSIS — R97.20 ELEVATED PSA: Primary | ICD-10-CM

## 2025-06-25 PROCEDURE — G8427 DOCREV CUR MEDS BY ELIG CLIN: HCPCS | Performed by: UROLOGY

## 2025-06-25 PROCEDURE — 99212 OFFICE O/P EST SF 10 MIN: CPT | Performed by: UROLOGY

## 2025-06-25 PROCEDURE — 3078F DIAST BP <80 MM HG: CPT | Performed by: UROLOGY

## 2025-06-25 PROCEDURE — 99213 OFFICE O/P EST LOW 20 MIN: CPT | Performed by: UROLOGY

## 2025-06-25 PROCEDURE — 1159F MED LIST DOCD IN RCRD: CPT | Performed by: UROLOGY

## 2025-06-25 PROCEDURE — 1123F ACP DISCUSS/DSCN MKR DOCD: CPT | Performed by: UROLOGY

## 2025-06-25 PROCEDURE — 3074F SYST BP LT 130 MM HG: CPT | Performed by: UROLOGY

## 2025-06-25 PROCEDURE — G8417 CALC BMI ABV UP PARAM F/U: HCPCS | Performed by: UROLOGY

## 2025-06-25 PROCEDURE — 1036F TOBACCO NON-USER: CPT | Performed by: UROLOGY

## 2025-06-25 ASSESSMENT — ENCOUNTER SYMPTOMS: ABDOMINAL PAIN: 0

## 2025-06-25 NOTE — PROGRESS NOTES
Pathology  Order: [id number]   Status: Edited Result - FINAL       Next appt: 2025 at 09:15 AM in Family Medicine (Nandini Arguello, GIL - CNP)    Test Result Released: No    0 Result Notes        Narrative  Performed by: [facility]                                       [facility]                                       [address] [place], [place] [address]                                       [phone]  FINAL SURGICAL PATHOLOGY REPORT  Patient Name:  FAWAD LARA             Accession No:  [id number]   Age Sex:   1945                   Location:      [facility]  Account No:    [id number]                  Collected:     2025  Med Rec No:    [id number]                   Received:      2025  Attend Phys:   JUDY KLEIN                Completed:     2025  Perform Phys:  NICHOLE DU          FINAL DIAGNOSIS:    A RIGHT PERIPHERAL ZONE LESION:  BENIGN PROSTATE TISSUE.  SEE COMMENT.    B LEFT TRANSITION ZONE LESION:  BENIGN PROSTATE TISSUE.  SEE COMMENT.    C LEFT LATERAL ZONE LESION:  BENIGN PROSTATE TISSUE.  SEE COMMENT.    D RIGHT PERIPHERAL ZONE RANDOM:  BENIGN PROSTATE TISSUE.  SEE COMMENT.    E LEFT PERIPHERAL ZONE RANDOM:  BENIGN PROSTATE TISSUE.  SEE COMMENT.    COMMENT: PIN4 IMMUNOHISTOCHEMICAL STAINS ARE DONE AND INTERPRETED WITH  APPROPRIATE CONTROL MATERIAL ON ALL BLOCKS SHOWING NO AREAS OF INCREASED  RACEMASE STAINING IN GLANDS AND ALL GLANDS LINED BY CYTOKERATIN AND/OR  P63.                    ANALYTE SPECIFIC REAGENT (ASR) DISCLAIMER    THE USE OF ONE OR MORE REAGENTS IN THE ABOVE MENTIONED TESTS IS REGULATED  AS AN ANALYTE SPECIFIC REAGENT (ASR). THESE TESTS WERE DEVELOPED AND  THEIR PERFORMANCE CHARACTERISTICS DETERMINED BY THE CLINICAL LABORATORIES  OF [facility]. THEY HAVE NOT BEEN CLEARED OR APPROVED BY THE  [place] FOOD AND DRUG ADMINISTRATION (FDA). THE FDA HAS DETERMINED THAT

## 2025-08-21 ENCOUNTER — OFFICE VISIT (OUTPATIENT)
Dept: FAMILY MEDICINE CLINIC | Age: 80
End: 2025-08-21

## 2025-08-21 VITALS
SYSTOLIC BLOOD PRESSURE: 126 MMHG | HEART RATE: 85 BPM | OXYGEN SATURATION: 93 % | BODY MASS INDEX: 35.84 KG/M2 | WEIGHT: 242 LBS | DIASTOLIC BLOOD PRESSURE: 86 MMHG | HEIGHT: 69 IN | TEMPERATURE: 98.8 F

## 2025-08-21 DIAGNOSIS — E66.01 SEVERE OBESITY (BMI 35.0-39.9) WITH COMORBIDITY (HCC): ICD-10-CM

## 2025-08-21 DIAGNOSIS — M79.89 LEFT LEG SWELLING: Primary | ICD-10-CM

## 2025-08-21 DIAGNOSIS — E78.2 MIXED HYPERLIPIDEMIA: ICD-10-CM

## 2025-08-21 DIAGNOSIS — I10 PRIMARY HYPERTENSION: ICD-10-CM

## 2025-08-21 ASSESSMENT — ENCOUNTER SYMPTOMS
DIARRHEA: 0
SHORTNESS OF BREATH: 0
CONSTIPATION: 0
COUGH: 0

## 2025-08-25 DIAGNOSIS — M79.89 LEFT LEG SWELLING: Primary | ICD-10-CM

## 2025-08-25 DIAGNOSIS — I25.10 CORONARY ARTERY DISEASE INVOLVING NATIVE CORONARY ARTERY OF NATIVE HEART WITHOUT ANGINA PECTORIS: ICD-10-CM

## 2025-08-29 ENCOUNTER — HOSPITAL ENCOUNTER (OUTPATIENT)
Dept: ULTRASOUND IMAGING | Age: 80
Discharge: HOME | End: 2025-08-31
Payer: MEDICARE

## 2025-08-29 DIAGNOSIS — M79.89 LEFT LEG SWELLING: ICD-10-CM

## 2025-08-29 PROCEDURE — 93971 EXTREMITY STUDY: CPT

## (undated) DEVICE — SUTURE POLY SZ 5 L30IN NONABSORBABLE GRN LR L75MM 3/8 CIR B499T

## (undated) DEVICE — LIQUIBAND RAPID ADHESIVE 36/CS 0.8ML: Brand: MEDLINE

## (undated) DEVICE — DRESSING HYDROFIBER AQUACEL AG ADVANTAGE 3.5X12 IN

## (undated) DEVICE — SUTURE MCRYL SZ 4-0 L27IN ABSRB UD L19MM PS-2 1/2 CIR PRIM Y426H

## (undated) DEVICE — SYRINGE MED 50ML LUERLOCK TIP

## (undated) DEVICE — NEEDLE CIRCLE 1/2 IN SUTURE TAPER L1.950IN DIA0.056IN ABD S STL

## (undated) DEVICE — Device: Brand: STABLECUT®

## (undated) DEVICE — GLOVE SURG SZ 9 THK91MIL LTX FREE SYN POLYISOPRENE ANTI

## (undated) DEVICE — GOWN,SIRUS,POLYRNF,BRTHSLV,XLN/XXL,18/CS: Brand: MEDLINE

## (undated) DEVICE — SUTURE ABSORBABLE ANTIBACT 1-0 CT-1 24 IN STRATAFIX PDS + SXPP1A443

## (undated) DEVICE — DRESSING HYDROFIBER AQUACEL AG ADVANTAGE 3.5X10 IN

## (undated) DEVICE — DRAPE,U/ SHT,SPLIT,PLAS,STERIL: Brand: MEDLINE

## (undated) DEVICE — SUTURE VCRL SZ 1 L36IN ABSRB UD L36MM CT-1 1/2 CIR J947H

## (undated) DEVICE — ELECTRODE ES L275IN 275IN BLDE TIP COAT PTFE TEF W EVAC

## (undated) DEVICE — SUTURE VCRL SZ 2-0 L36IN ABSRB UD L36MM CT-1 1/2 CIR J945H

## (undated) DEVICE — ELECTRODE PT RET AD L9FT HI MOIST COND ADH HYDRGEL CORDED

## (undated) DEVICE — GLOVE ORANGE PI 8 1/2   MSG9085

## (undated) DEVICE — GOWN,SIRUS,NONRNF,SETINSLV,2XL,18/CS: Brand: MEDLINE

## (undated) DEVICE — DRAPE,HIP,W/POUCHES,STERILE: Brand: MEDLINE

## (undated) DEVICE — SHEET,DRAPE,53X77,STERILE: Brand: MEDLINE

## (undated) DEVICE — FAN SPRAY KIT: Brand: PULSAVAC®

## (undated) DEVICE — Device

## (undated) DEVICE — SUTURE ETHBND EXCEL SZ 1 L30IN NONABSORBABLE GRN L36MM CT-1 X425H

## (undated) DEVICE — PILLOW POS W15XH6XL22IN RASPBERRY FOAM ABD W/ STRP DISP FOR

## (undated) DEVICE — HOOD: Brand: T7PLUS

## (undated) DEVICE — APPLICATOR MEDICATED 26 CC SOLUTION HI LT ORNG CHLORAPREP

## (undated) DEVICE — 4-PORT MANIFOLD: Brand: NEPTUNE 2